# Patient Record
Sex: FEMALE | Race: WHITE | Employment: OTHER | URBAN - METROPOLITAN AREA
[De-identification: names, ages, dates, MRNs, and addresses within clinical notes are randomized per-mention and may not be internally consistent; named-entity substitution may affect disease eponyms.]

---

## 2017-04-26 ENCOUNTER — TELEPHONE (OUTPATIENT)
Dept: FAMILY MEDICINE | Facility: CLINIC | Age: 82
End: 2017-04-26

## 2017-04-26 ENCOUNTER — OFFICE VISIT (OUTPATIENT)
Dept: FAMILY MEDICINE | Facility: CLINIC | Age: 82
End: 2017-04-26
Payer: MEDICARE

## 2017-04-26 VITALS
RESPIRATION RATE: 18 BRPM | OXYGEN SATURATION: 97 % | HEART RATE: 96 BPM | BODY MASS INDEX: 28.88 KG/M2 | WEIGHT: 163 LBS | DIASTOLIC BLOOD PRESSURE: 65 MMHG | TEMPERATURE: 98.9 F | HEIGHT: 63 IN | SYSTOLIC BLOOD PRESSURE: 149 MMHG

## 2017-04-26 DIAGNOSIS — M25.671 STIFFNESS OF BOTH ANKLE JOINTS: Primary | ICD-10-CM

## 2017-04-26 DIAGNOSIS — M25.471 SWOLLEN ANKLES: ICD-10-CM

## 2017-04-26 DIAGNOSIS — M25.672 STIFFNESS OF BOTH ANKLE JOINTS: Primary | ICD-10-CM

## 2017-04-26 DIAGNOSIS — M25.472 SWOLLEN ANKLES: ICD-10-CM

## 2017-04-26 LAB
CRP SERPL-MCNC: 31 MG/L (ref 0–8)
ERYTHROCYTE [SEDIMENTATION RATE] IN BLOOD BY WESTERGREN METHOD: 25 MM/H (ref 0–30)

## 2017-04-26 PROCEDURE — 86140 C-REACTIVE PROTEIN: CPT | Performed by: NURSE PRACTITIONER

## 2017-04-26 PROCEDURE — 84550 ASSAY OF BLOOD/URIC ACID: CPT | Performed by: NURSE PRACTITIONER

## 2017-04-26 PROCEDURE — 85652 RBC SED RATE AUTOMATED: CPT | Performed by: NURSE PRACTITIONER

## 2017-04-26 PROCEDURE — 36415 COLL VENOUS BLD VENIPUNCTURE: CPT | Performed by: NURSE PRACTITIONER

## 2017-04-26 PROCEDURE — 99214 OFFICE O/P EST MOD 30 MIN: CPT | Performed by: NURSE PRACTITIONER

## 2017-04-26 NOTE — TELEPHONE ENCOUNTER
I would be happy to see her next week, since I am taking my Internal Medicine Boards on Friday, I am trying not to overload my schedule today and tomorrow  If her symptoms are severe a team appointment this week may be helpful  Cant comment on uric acid without seeing her, although gout in both feet/ankles is unusual and prednisone usually takes care of gout

## 2017-04-26 NOTE — TELEPHONE ENCOUNTER
Reason for Call:  Request for results:    Name of test or procedure: labs    Date of test of procedure: 4/26/17    Location of the test or procedure: [facility] to leave the result message on voice mail or with a family member? YES    Phone number Patient can be reached at:  Home number on file [phone] (home)    Additional comments:     Call taken on 4/26/2017 at 10:56 AM by Светлана Nieves

## 2017-04-26 NOTE — TELEPHONE ENCOUNTER
To PCP:  Are you able to see patient today?  Please see below.  Possible gout flare.  Please advise.  Thank you.  Tonie Atkinson RN

## 2017-04-26 NOTE — TELEPHONE ENCOUNTER
Reason for Call:  Other appointment    Detailed comments: pt was told to make an appt   With dr Cuevas next week  No openings till 5/26/17  Please call pt back and accommodate    Phone Number Patient can be reached at: Home number on file [phone] (home)    Best Time:     Can we leave a detailed message on this number? YES    Call taken on 4/26/2017 at 11:00 AM by Светлана Nieves

## 2017-04-26 NOTE — PATIENT INSTRUCTIONS
Follow up with Dr Cuevas in 1-2 weeks   If you get more pain or swelling, please call us right away   We will wait and see what your blood work shows     Try to elevate your feet  Use the compression stockings, especially on the left leg

## 2017-04-26 NOTE — NURSING NOTE
"Chief Complaint   Patient presents with     Edema       Initial /65 (BP Location: Right arm, Patient Position: Right side, Cuff Size: Adult Regular)  Pulse 96  Temp 98.9  F (37.2  C) (Tympanic)  Resp 18  Ht 5' 3\" (1.6 m)  Wt 163 lb (73.9 kg)  SpO2 97%  BMI 28.87 kg/m2 Estimated body mass index is 28.87 kg/(m^2) as calculated from the following:    Height as of this encounter: 5' 3\" (1.6 m).    Weight as of this encounter: 163 lb (73.9 kg).  Medication Reconciliation: complete   America Becerra CMA (AAMA)        "

## 2017-04-26 NOTE — MR AVS SNAPSHOT
"              After Visit Summary   4/26/2017    Tanisha Muhammad    MRN: [id number]           Patient Information     Date Of Birth          9/10/1926        Visit Information        Provider Department      4/26/2017 10:30 AM Sabi Oakley APRN CNP [facility]        Today's Diagnoses     Stiffness of both ankle joints    -  1    Swollen ankles          Care Instructions    Follow up with Dr Cuevas in 1-2 weeks   If you get more pain or swelling, please call us right away   We will wait and see what your blood work shows     Try to elevate your feet  Use the compression stockings, especially on the left leg           Follow-ups after your visit        Who to contact     If you have questions or need follow up information about today's clinic visit or your schedule please contact [facility] directly at [phone].  Normal or non-critical lab and imaging results will be communicated to you by MyChart, letter or phone within 4 business days after the clinic has received the results. If you do not hear from us within 7 days, please contact the clinic through MyChart or phone. If you have a critical or abnormal lab result, we will notify you by phone as soon as possible.  Submit refill requests through Traxer or call your pharmacy and they will forward the refill request to us. Please allow 3 business days for your refill to be completed.          Additional Information About Your Visit        MyChart Information     Traxer lets you send messages to your doctor, view your test results, renew your prescriptions, schedule appointments and more. To sign up, go to [email or web address] . Click on \"Log in\" on the left side of the screen, which will take you to the Welcome page. Then click on \"Sign up Now\" on the right side of the page.     You will be asked to enter the access code listed below, as well as some personal information. Please follow the directions to create your username " "and password.     Your access code is: [email or web address]  Expires: 2017 10:32 AM     Your access code will  in 90 days. If you need help or a new code, please call your [place] clinic or [phone].        Care EveryWhere ID     This is your Care EveryWhere ID. This could be used by other organizations to access your [facility] medical records  [id number]        Your Vitals Were     Pulse Temperature Respirations Height Pulse Oximetry BMI (Body Mass Index)    96 98.9  F (37.2  C) (Tympanic) 18 5' 3\" (1.6 m) 97% 28.87 kg/m2       Blood Pressure from Last 3 Encounters:   17 149/65   10/13/16 138/82   16 149/70    Weight from Last 3 Encounters:   17 163 lb (73.9 kg)   10/13/16 160 lb (72.6 kg)   16 160 lb (72.6 kg)              We Performed the Following     CRP, inflammation     ESR: Erythrocyte sedimentation rate     Uric acid        Primary Care Provider Office Phone # Fax #    Sesar Cuevas [phone]       [facility] [address]  [facility] [address]        Thank you!     Thank you for choosing [facility]  for your care. Our goal is always to provide you with excellent care. Hearing back from our patients is one way we can continue to improve our services. Please take a few minutes to complete the written survey that you may receive in the mail after your visit with us. Thank you!             Your Updated Medication List - Protect others around you: Learn how to safely use, store and throw away your medicines at www.disposemymeds.org.          This list is accurate as of: 17 10:32 AM.  Always use your most recent med list.                   Brand Name Dispense Instructions for use    diphenhydrAMINE 25 MG tablet    BENADRYL    40 tablet    Take 1 tablet (25 mg) by mouth every 6 hours as needed for itching       losartan 50 MG tablet    COZAAR    90 tablet    TAKE 1 TABLET (50 MG) BY MOUTH DAILY       predniSONE 20 MG tablet    " DELTASONE    5 tablet    Take 1 tablet (20 mg) by mouth daily       VITAMIN B-12 PO          VITAMIN D3 PO      Take by mouth daily

## 2017-04-26 NOTE — TELEPHONE ENCOUNTER
Reason for call:  Patient reporting a symptom    Symptom or request: ankles are swollen and stiff.  She took prednisone after Easter for 5 days but it did not help      Have you been treated for this before? Yes    Additional comments: she is wondering if uric acid levels are high and should she get it tested.     Phone Number patient can be reached at:  Home number on file [phone] (home)    Best Time:  any    Can we leave a detailed message on this number:  YES    Call taken on 4/26/2017 at 8:01 AM by Ariana Patel

## 2017-04-26 NOTE — PROGRESS NOTES
HPI    SUBJECTIVE:                                                    Tanisha Muhammad is a 89 year old female who presents to clinic today for the following health issues:    Swelling-both ankles      Duration: 10 days    Description (location/character/radiation): Both ankles stiff, painful and swelling    Intensity:  moderate    Accompanying signs and symptoms: Unsure if ankles are warm to touch, no redness or bruising    History (similar episodes/previous evaluation): personal Hx of gout    Precipitating or alleviating factors: None    Therapies tried and outcome: elevating feet/legs-no relief     Patient presents with bilateral ankle pain, stiffness, and edema. Left ankle is slightly larger than right. She rates her pain at 6/10 on the pain scale. She stated, her symptom started after she came from [place].  Due to her history of gout, she took 20 mg Prednisone x5days but her symptom did not resolve. She has slight pain with range of motion in both ankles. She denies bilateral calf tenderness and does not have a history of blood clot. She also denies numbness,  tingling, other joints pain, known injury, SOB and chest pain.       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 Will need bx if continues following estrogen cream and leaving pessary out for 3-4 weeks.      Rectocele      Trigeminal neuralgia 2015     Unspecified essential hypertension      Unspecified venous (peripheral) insufficiency      Past Surgical History:   Procedure Laterality Date     C DENTAL CONSULTATION      Dr Kahlil CEJA EYE EXAM, EST PATIENT,COMPREHESV      DR white     CATARACT IOL, RT/LT      Left     LASER YAG CAPSULOTOMY Left  "10/2/2015    Procedure: LASER YAG CAPSULOTOMY;  Surgeon: Pieter Rios MD;  Location: [facility]     Social History   Substance Use Topics     Smoking status: Never Smoker     Smokeless tobacco: Never Used     Alcohol use 0.0 oz/week     0 Standard drinks or equivalent per week      Comment: one glass wine a month     Current Outpatient Prescriptions   Medication Sig Dispense Refill     losartan (COZAAR) 50 MG tablet TAKE 1 TABLET (50 MG) BY MOUTH DAILY 90 tablet 3     diphenhydrAMINE (BENADRYL) 25 MG tablet Take 1 tablet (25 mg) by mouth every 6 hours as needed for itching 40 tablet 0     Cyanocobalamin (VITAMIN B-12 PO)        Cholecalciferol (VITAMIN D3 PO) Take by mouth daily       predniSONE (DELTASONE) 20 MG tablet Take 1 tablet (20 mg) by mouth daily (Patient not taking: Reported on 4/26/2017) 5 tablet 1     Allergies   Allergen Reactions     Indomethacin      Cognitive impairment     Keflex [Cephalexin]      HIVES     Sulfamethoxazole-Trimethoprim        Reviewed PMH, med list and allergies.      Review of Systems   detailed as above       /65 (BP Location: Right arm, Patient Position: Right side, Cuff Size: Adult Regular)  Pulse 96  Temp 98.9  F (37.2  C) (Tympanic)  Resp 18  Ht 5' 3\" (1.6 m)  Wt 163 lb (73.9 kg)  SpO2 97%  BMI 28.87 kg/m2      Physical Exam   Constitutional: She is oriented to person, place, and time and well-developed, well-nourished, and in no distress.   HENT:   Head: Normocephalic.   Eyes: Conjunctivae are normal.   Cardiovascular: Intact distal pulses.    Pulmonary/Chest: Effort normal.   Musculoskeletal: Normal range of motion. She exhibits edema.   Left ankle (+2) larger than right ankle (+1)   Neurological: She is alert and oriented to person, place, and time.   Skin: Skin is warm and dry. No erythema.   Slight warmth to left ankle   Psychiatric: Affect normal.         Assessment and Plan:       ICD-10-CM    1. Stiffness of both ankle joints M25.671 Uric acid    " M25.672 CRP, inflammation     ESR: Erythrocyte sedimentation rate   2. Swollen ankles M25.471     M25.472      Tanisha's symptoms not c/w DVT considering the bilateral nature of her stiffness  Not perfectly c/w gout as she did not have resolution with prednisone, but will check uric acid level   Not c/w cardiopulm etiology   This is more likely arthritic vs exacerbation of edema considering recent travel. Will check screening inflammatory labs   Recommended elevation and compression stocking   F/u with PCP if persistent symptoms or call with questions      CRYSTAL Mcclendon, CNP  [facility]

## 2017-04-27 LAB — URATE SERPL-MCNC: 7.7 MG/DL (ref 2.6–6)

## 2017-04-27 NOTE — TELEPHONE ENCOUNTER
Patient called to check on her lab results.  She said someone left her a message.  Please call back when convienent.

## 2017-04-27 NOTE — TELEPHONE ENCOUNTER
Dr. Cuevas, this TE was mistakenly taken over for lab results. Are we able to take a   Hospital F/U appt for this pt, please scroll down to see original message.

## 2017-04-27 NOTE — TELEPHONE ENCOUNTER
This encounter was originally asking Dr. Cuevas where can put pt in schedule, was told to see him next week.      Has previously been routed to Dr. Cuevas.     There was a separate Tel Enc started yesterday for lab results which have been given.   Notes Recorded by Sabi Oakley APRN CNP on 4/27/2017 at 3:21 PM  Spoke with pt about results. She will try a dose of 600mg ibu tonight and tomorrow if needed.      Gaye MARAVILLA MA

## 2017-05-01 ENCOUNTER — HOSPITAL ENCOUNTER (OUTPATIENT)
Dept: ULTRASOUND IMAGING | Facility: CLINIC | Age: 82
Discharge: HOME | End: 2017-05-01
Attending: INTERNAL MEDICINE | Admitting: INTERNAL MEDICINE
Payer: MEDICARE

## 2017-05-01 ENCOUNTER — OFFICE VISIT (OUTPATIENT)
Dept: FAMILY MEDICINE | Facility: CLINIC | Age: 82
End: 2017-05-01
Payer: MEDICARE

## 2017-05-01 VITALS
DIASTOLIC BLOOD PRESSURE: 67 MMHG | HEIGHT: 63 IN | TEMPERATURE: 98.2 F | OXYGEN SATURATION: 97 % | SYSTOLIC BLOOD PRESSURE: 153 MMHG | HEART RATE: 83 BPM | BODY MASS INDEX: 28.88 KG/M2 | WEIGHT: 163 LBS

## 2017-05-01 DIAGNOSIS — M10.9 ACUTE GOUTY ARTHRITIS: ICD-10-CM

## 2017-05-01 DIAGNOSIS — M79.605 PAIN OF LEFT LOWER EXTREMITY: Primary | ICD-10-CM

## 2017-05-01 DIAGNOSIS — M79.89 LEFT LEG SWELLING: ICD-10-CM

## 2017-05-01 DIAGNOSIS — M79.605 PAIN OF LEFT LOWER EXTREMITY: ICD-10-CM

## 2017-05-01 PROCEDURE — 99213 OFFICE O/P EST LOW 20 MIN: CPT | Performed by: INTERNAL MEDICINE

## 2017-05-01 PROCEDURE — 93971 EXTREMITY STUDY: CPT | Mod: LT

## 2017-05-01 RX ORDER — PREDNISONE 20 MG/1
40 TABLET ORAL DAILY
Qty: 10 TABLET | Refills: 0 | Status: SHIPPED | OUTPATIENT
Start: 2017-05-01 | End: 2017-05-06

## 2017-05-01 ASSESSMENT — PAIN SCALES - GENERAL: PAINLEVEL: SEVERE PAIN (7)

## 2017-05-01 NOTE — NURSING NOTE
"Chief Complaint   Patient presents with     Musculoskeletal Problem     F/U Bilateral ankle pain and edema. Right ankle has completely resolved, left is still painful. Has started wearing a short Cam boot to help.        Initial /67 (BP Location: Right arm, Cuff Size: Adult Large)  Pulse 83  Temp 98.2  F (36.8  C) (Tympanic)  Ht 5' 3\" (1.6 m)  Wt 163 lb (73.9 kg)  SpO2 97%  Breastfeeding? No  BMI 28.87 kg/m2 Estimated body mass index is 28.87 kg/(m^2) as calculated from the following:    Height as of this encounter: 5' 3\" (1.6 m).    Weight as of this encounter: 163 lb (73.9 kg).  Medication Reconciliation: complete     Katerin Bonner MA    "

## 2017-05-01 NOTE — MR AVS SNAPSHOT
After Visit Summary   5/1/2017    Tanisha Muhammad    MRN: [id number]           Patient Information     Date Of Birth          9/10/1926        Visit Information        Provider Department      5/1/2017 3:30 PM Sesar Cuevas MD [facility]        Today's Diagnoses     Pain of left lower extremity    -  1    Left leg swelling        Acute gouty arthritis           Follow-ups after your visit        Your next 10 appointments already scheduled     May 01, 2017  5:30 PM CDT   US LOWER EXTREMITY VENOUS DUPLEX LEFT with SHUS1   [facility] ([facility])    [address] [facility] [id number] [phone]           Please bring a list of your medicines (including vitamins, minerals and over-the-counter drugs). Also, tell your doctor about any allergies you may have. Wear comfortable clothes and leave your valuables at home.  You do not need to do anything special to prepare for your exam.  Please call the Imaging Department at your exam site with any questions.            May 11, 2017  2:00 PM CDT   Office Visit with Sesar Cuevas MD   [facility] ([facility])    [address] [facility] [address] [phone]           Bring a current list of meds and any records pertaining to this visit.  For Physicals, please bring immunization records and any forms needing to be filled out.  Please arrive 10 minutes early to complete paperwork.              Future tests that were ordered for you today     Open Future Orders        Priority Expected Expires Ordered    US Lower Extremity Venous Duplex Left Today  5/1/2018 5/1/2017            Who to contact     If you have questions or need follow up information about today's clinic visit or your schedule please contact [facility] directly at [phone].  Normal or non-critical lab and imaging results will be communicated to you by MyChart, letter or phone within 4  "business days after the clinic has received the results. If you do not hear from us within 7 days, please contact the clinic through Snapcious or phone. If you have a critical or abnormal lab result, we will notify you by phone as soon as possible.  Submit refill requests through Snapcious or call your pharmacy and they will forward the refill request to us. Please allow 3 business days for your refill to be completed.          Additional Information About Your Visit        Snapcious Information     Snapcious lets you send messages to your doctor, view your test results, renew your prescriptions, schedule appointments and more. To sign up, go to [email or web address] . Click on \"Log in\" on the left side of the screen, which will take you to the Welcome page. Then click on \"Sign up Now\" on the right side of the page.     You will be asked to enter the access code listed below, as well as some personal information. Please follow the directions to create your username and password.     Your access code is: [email or web address]  Expires: 2017 10:32 AM     Your access code will  in 90 days. If you need help or a new code, please call your [place] [facility] clinic or [phone].        Care EveryWhere ID     This is your Care EveryWhere ID. This could be used by other organizations to access your [facility] medical records  [id number]        Your Vitals Were     Pulse Temperature Height Pulse Oximetry Breastfeeding? BMI (Body Mass Index)    83 98.2  F (36.8  C) (Tympanic) 5' 3\" (1.6 m) 97% No 28.87 kg/m2       Blood Pressure from Last 3 Encounters:   17 153/67   17 149/65   10/13/16 138/82    Weight from Last 3 Encounters:   17 163 lb (73.9 kg)   17 163 lb (73.9 kg)   10/13/16 160 lb (72.6 kg)                 Today's Medication Changes          These changes are accurate as of: 17  5:28 PM.  If you have any questions, ask your nurse or doctor.               These medicines have changed or have updated " prescriptions.        Dose/Directions    * predniSONE 20 MG tablet   Commonly known as:  DELTASONE   This may have changed:  Another medication with the same name was added. Make sure you understand how and when to take each.   Used for:  Gout, unspecified cause, unspecified chronicity, unspecified site   Changed by:  Sesar Cuevas MD        Dose:  20 mg   Take 1 tablet (20 mg) by mouth daily   Quantity:  5 tablet   Refills:  1       * predniSONE 20 MG tablet   Commonly known as:  DELTASONE   This may have changed:  You were already taking a medication with the same name, and this prescription was added. Make sure you understand how and when to take each.   Used for:  Acute gouty arthritis   Changed by:  Sesar Cuevas MD        Dose:  40 mg   Take 2 tablets (40 mg) by mouth daily for 5 days   Quantity:  10 tablet   Refills:  0       * Notice:  This list has 2 medication(s) that are the same as other medications prescribed for you. Read the directions carefully, and ask your doctor or other care provider to review them with you.         Where to get your medicines      These medications were sent to [facility] [address], [facility] [[email or web address]] [address]  [address] [place] [address] [address], [place] [address], [facility] [address]     Phone:  [phone]     predniSONE 20 MG tablet                Primary Care Provider Office Phone # Fax #    Sesar Cuevas [phone]       [facility] [address] [place] [address]        Thank you!     Thank you for choosing [facility]  for your care. Our goal is always to provide you with excellent care. Hearing back from our patients is one way we can continue to improve our services. Please take a few minutes to complete the written survey that you may receive in the mail after your visit with us. Thank you!             Your Updated Medication List - Protect others around you: Learn how to safely use, store and throw away your medicines  at www.disposemymeds.org.          This list is accurate as of: 5/1/17  5:28 PM.  Always use your most recent med list.                   Brand Name Dispense Instructions for use    diphenhydrAMINE 25 MG tablet    BENADRYL    40 tablet    Take 1 tablet (25 mg) by mouth every 6 hours as needed for itching       losartan 50 MG tablet    COZAAR    90 tablet    TAKE 1 TABLET (50 MG) BY MOUTH DAILY       * predniSONE 20 MG tablet    DELTASONE    5 tablet    Take 1 tablet (20 mg) by mouth daily       * predniSONE 20 MG tablet    DELTASONE    10 tablet    Take 2 tablets (40 mg) by mouth daily for 5 days       VITAMIN B-12 PO          VITAMIN D3 PO      Take by mouth daily       * Notice:  This list has 2 medication(s) that are the same as other medications prescribed for you. Read the directions carefully, and ask your doctor or other care provider to review them with you.
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
## 2017-05-11 ENCOUNTER — OFFICE VISIT (OUTPATIENT)
Dept: FAMILY MEDICINE | Facility: CLINIC | Age: 82
End: 2017-05-11
Payer: MEDICARE

## 2017-05-11 VITALS
SYSTOLIC BLOOD PRESSURE: 142 MMHG | TEMPERATURE: 96.7 F | OXYGEN SATURATION: 100 % | HEART RATE: 74 BPM | DIASTOLIC BLOOD PRESSURE: 65 MMHG

## 2017-05-11 DIAGNOSIS — M1A.09X0 CHRONIC GOUT OF MULTIPLE SITES, UNSPECIFIED CAUSE: Primary | ICD-10-CM

## 2017-05-11 PROBLEM — I49.9 CARDIAC ARRHYTHMIA: Status: ACTIVE | Noted: 2017-05-11

## 2017-05-11 PROBLEM — E55.9 VITAMIN D DEFICIENCY: Status: ACTIVE | Noted: 2017-05-11

## 2017-05-11 PROBLEM — E53.9 VITAMIN B-COMPLEX DEFICIENCY: Status: ACTIVE | Noted: 2017-05-11

## 2017-05-11 PROCEDURE — 99213 OFFICE O/P EST LOW 20 MIN: CPT | Performed by: INTERNAL MEDICINE

## 2017-05-11 RX ORDER — PREDNISONE 20 MG/1
40 TABLET ORAL DAILY
Qty: 10 TABLET | Refills: 0 | Status: SHIPPED | OUTPATIENT
Start: 2017-05-11 | End: 2017-08-04

## 2017-05-11 RX ORDER — ALLOPURINOL 100 MG/1
100 TABLET ORAL DAILY
Qty: 90 TABLET | Refills: 3 | Status: SHIPPED | OUTPATIENT
Start: 2017-05-11 | End: 2017-08-05

## 2017-05-11 RX ORDER — CHLORHEXIDINE GLUCONATE ORAL RINSE 1.2 MG/ML
SOLUTION DENTAL
Refills: 0 | COMMUNITY
Start: 2017-05-04 | End: 2017-08-04

## 2017-05-11 RX ORDER — AMOXICILLIN 500 MG/1
CAPSULE ORAL
Refills: 0 | COMMUNITY
Start: 2017-05-04 | End: 2017-08-04

## 2017-05-11 NOTE — PATIENT INSTRUCTIONS
To prevent future gout attacks, start allopurinol    Take prednisone for the first 5 days of starting allopurinol    Return to the lab here in 1 month to check uric acid level    Our goal is a uric acid level less than 6

## 2017-05-11 NOTE — MR AVS SNAPSHOT
After Visit Summary   5/11/2017    Tnaisha Muhammad    MRN: [id number]           Patient Information     Date Of Birth          9/10/1926        Visit Information        Provider Department      5/11/2017 2:00 PM Sesar Cuevas MD [facility]        Today's Diagnoses     Chronic gout of multiple sites, unspecified cause    -  1      Care Instructions    To prevent future gout attacks, start allopurinol    Take prednisone for the first 5 days of starting allopurinol    Return to the lab here in 1 month to check uric acid level    Our goal is a uric acid level less than 6         Follow-ups after your visit        Follow-up notes from your care team     Return in about 4 weeks (around 6/8/2017) for Lab Work.      Future tests that were ordered for you today     Open Future Orders        Priority Expected Expires Ordered    **Basic metabolic panel FUTURE 2mo Routine 7/10/2017 9/8/2017 5/11/2017    **Uric acid FUTURE 2mo Routine 7/10/2017 9/8/2017 5/11/2017            Who to contact     If you have questions or need follow up information about today's clinic visit or your schedule please contact [facility] directly at [phone].  Normal or non-critical lab and imaging results will be communicated to you by MyChart, letter or phone within 4 business days after the clinic has received the results. If you do not hear from us within 7 days, please contact the clinic through [employer] or phone. If you have a critical or abnormal lab result, we will notify you by phone as soon as possible.  Submit refill requests through [employer] or call your pharmacy and they will forward the refill request to us. Please allow 3 business days for your refill to be completed.          Additional Information About Your Visit        MyChart Information     [employer]ator lets you send messages to your doctor, view your test results, renew your prescriptions, schedule appointments and more. To sign up, go to  [email or web address] . Click on \"Log in\" on the left side of the screen, which will take you to the Welcome page. Then click on \"Sign up Now\" on the right side of the page.     You will be asked to enter the access code listed below, as well as some personal information. Please follow the directions to create your username and password.     Your access code is: [email or web address]  Expires: 2017 10:32 AM     Your access code will  in 90 days. If you need help or a new code, please call your [facility] clinic or [phone].        Care EveryWhere ID     This is your Care EveryWhere ID. This could be used by other organizations to access your [facility] medical records  [id number]        Your Vitals Were     Pulse Temperature Pulse Oximetry Breastfeeding?          74 96.7  F (35.9  C) (Tympanic) 100% No         Blood Pressure from Last 3 Encounters:   17 142/65   17 153/67   17 149/65    Weight from Last 3 Encounters:   17 163 lb (73.9 kg)   17 163 lb (73.9 kg)   10/13/16 160 lb (72.6 kg)                 Today's Medication Changes          These changes are accurate as of: 17  2:38 PM.  If you have any questions, ask your nurse or doctor.               Start taking these medicines.        Dose/Directions    allopurinol 100 MG tablet   Commonly known as:  ZYLOPRIM   Used for:  Chronic gout of multiple sites, unspecified cause   Started by:  Sesar uCevas MD        Dose:  100 mg   Take 1 tablet (100 mg) by mouth daily   Quantity:  90 tablet   Refills:  3         These medicines have changed or have updated prescriptions.        Dose/Directions    predniSONE 20 MG tablet   Commonly known as:  DELTASONE   This may have changed:  how much to take   Changed by:  Sesar Cuevas MD        Dose:  40 mg   Take 2 tablets (40 mg) by mouth daily   Quantity:  10 tablet   Refills:  0            Where to get your medicines      These medications were sent to [id number] - [place] - [address]  [id number], [place] [address]     Phone:  [phone]     allopurinol 100 MG tablet    predniSONE 20 MG tablet                Primary Care Provider Office Phone # Fax #    Sesar Cuevas [phone]       [facility] [address]  [facility] [address]        Thank you!     Thank you for choosing [facility]  for your care. Our goal is always to provide you with excellent care. Hearing back from our patients is one way we can continue to improve our services. Please take a few minutes to complete the written survey that you may receive in the mail after your visit with us. Thank you!             Your Updated Medication List - Protect others around you: Learn how to safely use, store and throw away your medicines at www.disposemymeds.org.          This list is accurate as of: 5/11/17  2:38 PM.  Always use your most recent med list.                   Brand Name Dispense Instructions for use    allopurinol 100 MG tablet    ZYLOPRIM    90 tablet    Take 1 tablet (100 mg) by mouth daily       amoxicillin 500 MG capsule    AMOXIL         chlorhexidine 0.12 % solution    PERIDEX         diphenhydrAMINE 25 MG tablet    BENADRYL    40 tablet    Take 1 tablet (25 mg) by mouth every 6 hours as needed for itching       IBUPROFEN PO      Take 200 mg by mouth every 6 hours       losartan 50 MG tablet    COZAAR    90 tablet    TAKE 1 TABLET (50 MG) BY MOUTH DAILY       predniSONE 20 MG tablet    DELTASONE    10 tablet    Take 2 tablets (40 mg) by mouth daily       VITAMIN B-12 PO          VITAMIN D3 PO      Take by mouth daily

## 2017-05-11 NOTE — NURSING NOTE
"Chief Complaint   Patient presents with     Leg Swelling     follow up left leg edema. Pain has resolved, but still has some swelling, especially after eating foods with sodium.       Initial /65 (BP Location: Right arm, Cuff Size: Adult Large)  Pulse 74  Temp 96.7  F (35.9  C) (Tympanic)  SpO2 100%  Breastfeeding? No Estimated body mass index is 28.87 kg/(m^2) as calculated from the following:    Height as of 5/1/17: 5' 3\" (1.6 m).    Weight as of 5/1/17: 163 lb (73.9 kg).  Medication Reconciliation: complete     Katerin Bonner MA      "
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
## 2017-08-04 ENCOUNTER — TELEPHONE (OUTPATIENT)
Dept: FAMILY MEDICINE | Facility: CLINIC | Age: 82
End: 2017-08-04

## 2017-08-04 ENCOUNTER — OFFICE VISIT (OUTPATIENT)
Dept: FAMILY MEDICINE | Facility: CLINIC | Age: 82
End: 2017-08-04
Payer: MEDICARE

## 2017-08-04 VITALS
BODY MASS INDEX: 29.06 KG/M2 | TEMPERATURE: 97.8 F | WEIGHT: 164 LBS | HEART RATE: 78 BPM | HEIGHT: 63 IN | OXYGEN SATURATION: 96 % | DIASTOLIC BLOOD PRESSURE: 73 MMHG | SYSTOLIC BLOOD PRESSURE: 164 MMHG

## 2017-08-04 DIAGNOSIS — M1A.09X0 CHRONIC GOUT OF MULTIPLE SITES, UNSPECIFIED CAUSE: ICD-10-CM

## 2017-08-04 DIAGNOSIS — M10.9 GOUT, UNSPECIFIED CAUSE, UNSPECIFIED CHRONICITY, UNSPECIFIED SITE: Primary | ICD-10-CM

## 2017-08-04 PROCEDURE — 99213 OFFICE O/P EST LOW 20 MIN: CPT | Performed by: NURSE PRACTITIONER

## 2017-08-04 PROCEDURE — 36415 COLL VENOUS BLD VENIPUNCTURE: CPT | Performed by: INTERNAL MEDICINE

## 2017-08-04 PROCEDURE — 80048 BASIC METABOLIC PNL TOTAL CA: CPT | Performed by: INTERNAL MEDICINE

## 2017-08-04 PROCEDURE — 84550 ASSAY OF BLOOD/URIC ACID: CPT | Performed by: INTERNAL MEDICINE

## 2017-08-04 RX ORDER — PREDNISONE 20 MG/1
40 TABLET ORAL DAILY
Qty: 10 TABLET | Refills: 0 | Status: SHIPPED | OUTPATIENT
Start: 2017-08-04 | End: 2019-03-11

## 2017-08-04 NOTE — NURSING NOTE
"Chief Complaint   Patient presents with     Follow Up For     Chronic gout of multiple sites       Initial /73 (BP Location: Right arm, Cuff Size: Adult Large)  Pulse 78  Temp 97.8  F (36.6  C) (Oral)  Ht 5' 3\" (1.6 m)  Wt 164 lb (74.4 kg)  SpO2 96%  BMI 29.05 kg/m2 Estimated body mass index is 29.05 kg/(m^2) as calculated from the following:    Height as of this encounter: 5' 3\" (1.6 m).    Weight as of this encounter: 164 lb (74.4 kg).  Medication Reconciliation: complete       Angela Hamilton CMA      "

## 2017-08-04 NOTE — TELEPHONE ENCOUNTER
Pt reports didn't take allopurinol 2 days in a row.  Reports on side of right foot - Foot is swollen, painful, red.  Denies SOB Chest Pain     Scheduled with team for 2 hours for now for eval if Gout attack and needing further medication/tx or if needing other further eval.  Pt in agreement with this plan.  Casie Fish RN

## 2017-08-04 NOTE — PROGRESS NOTES
HPI      SUBJECTIVE:                                                    Tanisha Muhammad is a 89 year old female who presents to clinic today for the following health issues:      Chief Complaint   Patient presents with     Follow Up For     Chronic gout of multiple sites   Right foot pain and swelling. She state that her last gout attack was in May 2017.      Had been doing well on allopurinol  Forgot to take it for 2 days then had food she shouldn't have eaten--marks and pork   Now has had 3 days of right foot swelling and pain   Took 600mg ibu today that helped a lot   No fevers       Past Medical History:   Diagnosis Date     Cystocele 03/2010    midline     Elevated glucose      Gout 03/2010     [place]'s disease 2010     Inactive Ménière's disease     Left ear deafness     Post-menopausal bleeding 01/02/2014    possibly due to pessary, area of irritated skin visualized on exam with active light bleeding posterior to cervix. Will need bx if continues following estrogen cream and leaving pessary out for 3-4 weeks.      Rectocele      Trigeminal neuralgia 2015     Unspecified essential hypertension      Unspecified venous (peripheral) insufficiency      Past Surgical History:   Procedure Laterality Date     C DENTAL CONSULTATION      Dr Kahlil CEJA EYE EXAM, EST PATIENT,COMPREHESV      DR white     CATARACT IOL, RT/LT      Left     LASER YAG CAPSULOTOMY Left 10/2/2015    Procedure: LASER YAG CAPSULOTOMY;  Surgeon: Pieter Rios MD;  Location: [facility]     Social History   Substance Use Topics     Smoking status: Never Smoker     Smokeless tobacco: Never Used     Alcohol use 0.0 oz/week     0 Standard drinks or equivalent per week      Comment: one glass wine a month     Current Outpatient Prescriptions   Medication Sig Dispense Refill     IBUPROFEN PO Take 600 mg by mouth every 6 hours        allopurinol (ZYLOPRIM) 100 MG tablet Take 1 tablet (100 mg) by mouth daily 90 tablet 3     losartan (COZAAR) 50 MG  "tablet TAKE 1 TABLET (50 MG) BY MOUTH DAILY 90 tablet 3     Cyanocobalamin (VITAMIN B-12 PO) Take 1 tablet by mouth daily        Cholecalciferol (VITAMIN D3 PO) Take 1 tablet by mouth daily        Allergies   Allergen Reactions     Indomethacin      Cognitive impairment     Keflex [Cephalexin]      HIVES     Sulfamethoxazole-Trimethoprim        Reviewed and updated as needed this visit by clinical staff and provider      ROS  Detailed as above       /73 (BP Location: Right arm, Cuff Size: Adult Large)  Pulse 78  Temp 97.8  F (36.6  C) (Oral)  Ht 5' 3\" (1.6 m)  Wt 164 lb (74.4 kg)  SpO2 96%  BMI 29.05 kg/m2      Physical Exam   Constitutional: She is well-developed, well-nourished, and in no distress.   HENT:   Head: Normocephalic.   Pulmonary/Chest: Effort normal.   Musculoskeletal: Normal range of motion.   Right dorsal foot swelling and mild erythema and warmth    Neurological: She is alert.   Psychiatric: Mood and affect normal.   Vitals reviewed.      Assessment and Plan:       ICD-10-CM    1. Gout, unspecified cause, unspecified chronicity, unspecified site M10.9 predniSONE (DELTASONE) 20 MG tablet     **Basic metabolic panel FUTURE 2mo     **Uric acid FUTURE 2mo       Will treat with prednisone as this has worked well in the past   She will get labs completed today   She will continue her allopurinol       CRYSTAL Mcclendon, CNP  [facility]    "

## 2017-08-04 NOTE — MR AVS SNAPSHOT
"              After Visit Summary   2017    Tanisha Muhammad    MRN: [id number]           Patient Information     Date Of Birth          9/10/1926        Visit Information        Provider Department      2017 2:00 PM Sabi Oakley APRN CNP [facility]        Today's Diagnoses     Gout, unspecified cause, unspecified chronicity, unspecified site    -  1      Care Instructions    Take the prednisone as prescribed           Follow-ups after your visit        Who to contact     If you have questions or need follow up information about today's clinic visit or your schedule please contact [facility] directly at [phone].  Normal or non-critical lab and imaging results will be communicated to you by Ausrahart, letter or phone within 4 business days after the clinic has received the results. If you do not hear from us within 7 days, please contact the clinic through MyChart or phone. If you have a critical or abnormal lab result, we will notify you by phone as soon as possible.  Submit refill requests through Omnistream or call your pharmacy and they will forward the refill request to us. Please allow 3 business days for your refill to be completed.          Additional Information About Your Visit        MyChart Information     Omnistream lets you send messages to your doctor, view your test results, renew your prescriptions, schedule appointments and more. To sign up, go to [email or web address] . Click on \"Log in\" on the left side of the screen, which will take you to the Welcome page. Then click on \"Sign up Now\" on the right side of the page.     You will be asked to enter the access code listed below, as well as some personal information. Please follow the directions to create your username and password.     Your access code is: [email or web address]  Expires: 2017  4:26 PM     Your access code will  in 90 days. If you need help or a new code, please call your [place] clinic or " "[phone].        Care EveryWhere ID     This is your Care EveryWhere ID. This could be used by other organizations to access your [place] medical records  [id number]        Your Vitals Were     Pulse Temperature Height Pulse Oximetry BMI (Body Mass Index)       78 97.8  F (36.6  C) (Oral) 5' 3\" (1.6 m) 96% 29.05 kg/m2        Blood Pressure from Last 3 Encounters:   08/04/17 164/73   05/11/17 142/65   05/01/17 153/67    Weight from Last 3 Encounters:   08/04/17 164 lb (74.4 kg)   05/01/17 163 lb (73.9 kg)   04/26/17 163 lb (73.9 kg)              We Performed the Following     **Basic metabolic panel FUTURE 2mo     **Uric acid FUTURE 2mo          Today's Medication Changes          These changes are accurate as of: 8/4/17  4:26 PM.  If you have any questions, ask your nurse or doctor.               Start taking these medicines.        Dose/Directions    predniSONE 20 MG tablet   Commonly known as:  DELTASONE   Used for:  Gout, unspecified cause, unspecified chronicity, unspecified site   Started by:  Sabi Oakley APRN CNP        Dose:  40 mg   Take 2 tablets (40 mg) by mouth daily for 5 days   Quantity:  10 tablet   Refills:  0         Stop taking these medicines if you haven't already. Please contact your care team if you have questions.     amoxicillin 500 MG capsule   Commonly known as:  AMOXIL   Stopped by:  Sabi Oakley APRN CNP           chlorhexidine 0.12 % solution   Commonly known as:  PERIDEX   Stopped by:  Sabi Oakley APRN CNP           diphenhydrAMINE 25 MG tablet   Commonly known as:  BENADRYL   Stopped by:  Sabi Oakley APRN CNP                Where to get your medicines      These medications were sent to [facility], [place] - [address], [address]  [address], [address], [facility] [address]     Phone:  [phone]     predniSONE 20 MG tablet                Primary Care Provider Office Phone # Fax #    Sesar Cuevas MD " [phone]       [facility] [address]  [facility] [address]        Equal Access to Services     MARCO PEREZ : Hadii aad ku hadjasvirviktoriya Francisco, leonorada brittanyberkley, juan carlosta kabensonda rodri, theron pierre laCheloalessandro fitzgerald. So [facility] [phone].    ATENCIÓN: Si habla español, tiene a villanueva disposición servicios gratuitos de asistencia lingüística. Llame al [phone].    We comply with applicable federal civil rights laws and [place] laws. We do not discriminate on the basis of race, color, national origin, age, disability sex, sexual orientation or gender identity.            Thank you!     Thank you for choosing [facility]  for your care. Our goal is always to provide you with excellent care. Hearing back from our patients is one way we can continue to improve our services. Please take a few minutes to complete the written survey that you may receive in the mail after your visit with us. Thank you!             Your Updated Medication List - Protect others around you: Learn how to safely use, store and throw away your medicines at www.disposemymeds.org.          This list is accurate as of: 8/4/17  4:26 PM.  Always use your most recent med list.                   Brand Name Dispense Instructions for use Diagnosis    allopurinol 100 MG tablet    ZYLOPRIM    90 tablet    Take 1 tablet (100 mg) by mouth daily    Chronic gout of multiple sites, unspecified cause       IBUPROFEN PO      Take 600 mg by mouth every 6 hours        losartan 50 MG tablet    COZAAR    90 tablet    TAKE 1 TABLET (50 MG) BY MOUTH DAILY    Essential hypertension, benign       predniSONE 20 MG tablet    DELTASONE    10 tablet    Take 2 tablets (40 mg) by mouth daily for 5 days    Gout, unspecified cause, unspecified chronicity, unspecified site       VITAMIN B-12 PO      Take 1 tablet by mouth daily        VITAMIN D3 PO      Take 1 tablet by mouth daily

## 2017-08-04 NOTE — LETTER
[place] [address] [facility]  [address]  [place]  [address]  Tel: [phone]    August 7, 2017    Tanisha Muhammad  [address]   [facility] [address]        Dear Ms. Muhammad,    The following letter pertains to your most recent diagnostic tests:    -Your gout blood test (uric acid) is above your goal of uric acid less than 6.  Having a uric acid less than 6 optimally prevent gout attacks.       Bottom line:  Increase allopurinol from 100mg daily to 150mg daily (1.5 tablets).        Follow up:  Recheck uric acid level in 2 months.  You can schedule a lab appointment for that purpose.         I have sent a new prescritpion to pharmacy indicating the allopurinol dose change.        If you have any further questions or problems, please contact our office.      Sincerely,    Sesar Cuevas MD/[place],CMA      Enclosure: Lab Results      Results for orders placed or performed in visit on 08/04/17   **Basic metabolic panel FUTURE 2mo   Result Value Ref Range    Sodium 140 133 - 144 mmol/L    Potassium 4.5 3.4 - 5.3 mmol/L    Chloride 106 94 - 109 mmol/L    Carbon Dioxide 27 20 - 32 mmol/L    Anion Gap 7 3 - 14 mmol/L    Glucose 96 70 - 99 mg/dL    Urea Nitrogen 22 7 - 30 mg/dL    Creatinine 1.03 0.52 - 1.04 mg/dL    GFR Estimate 50 (L) >60 mL/min/1.7m2    GFR Estimate If Black 61 >60 mL/min/1.7m2    Calcium 8.9 8.5 - 10.1 mg/dL   **Uric acid FUTURE 2mo   Result Value Ref Range    Uric Acid 6.7 (H) 2.6 - 6.0 mg/dL

## 2017-08-04 NOTE — TELEPHONE ENCOUNTER
Reason for Call:  Other     Detailed comments: Patient has gout, has called pharmacy for steroids rx  Took 600 mg of Ibuprofen this morning, wondering if that should help or what else  She can do    Phone Number Patient can be reached at: Home number on file [phone] (home)    Best Time: anytime    Can we leave a detailed message on this number? YES    Call taken on 8/4/2017 at 8:37 AM by Marzena Null

## 2017-08-05 LAB
ANION GAP SERPL CALCULATED.3IONS-SCNC: 7 MMOL/L (ref 3–14)
BUN SERPL-MCNC: 22 MG/DL (ref 7–30)
CALCIUM SERPL-MCNC: 8.9 MG/DL (ref 8.5–10.1)
CHLORIDE SERPL-SCNC: 106 MMOL/L (ref 94–109)
CO2 SERPL-SCNC: 27 MMOL/L (ref 20–32)
CREAT SERPL-MCNC: 1.03 MG/DL (ref 0.52–1.04)
GFR SERPL CREATININE-BSD FRML MDRD: 50 ML/MIN/1.7M2
GLUCOSE SERPL-MCNC: 96 MG/DL (ref 70–99)
POTASSIUM SERPL-SCNC: 4.5 MMOL/L (ref 3.4–5.3)
SODIUM SERPL-SCNC: 140 MMOL/L (ref 133–144)
URATE SERPL-MCNC: 6.7 MG/DL (ref 2.6–6)

## 2017-08-05 RX ORDER — ALLOPURINOL 100 MG/1
150 TABLET ORAL DAILY
Qty: 135 TABLET | Refills: 3 | Status: SHIPPED | OUTPATIENT
Start: 2017-08-05 | End: 2017-10-19

## 2017-08-05 NOTE — PROGRESS NOTES
The following letter pertains to your most recent diagnostic tests:    -Your gout blood test (uric acid) is above your goal of uric acid less than 6.  Having a uric acid less than 6 optimally prevent gout attacks.       Bottom line:  Increase allopurinol from 100mg daily to 150mg daily (1.5 tablets).        Follow up:  Recheck uric acid level in 2 months.  You can schedule a lab appointment for that purpose.        I have sent a new prescritpion to pharmacy indicating the allopurinol dose change.        Sincerely,    Dr. Cuevas

## 2017-08-12 ENCOUNTER — HEALTH MAINTENANCE LETTER (OUTPATIENT)
Age: 82
End: 2017-08-12

## 2017-08-18 ENCOUNTER — OFFICE VISIT (OUTPATIENT)
Dept: OBGYN | Facility: CLINIC | Age: 82
End: 2017-08-18
Payer: MEDICARE

## 2017-08-18 VITALS
HEIGHT: 63 IN | WEIGHT: 164 LBS | BODY MASS INDEX: 29.06 KG/M2 | DIASTOLIC BLOOD PRESSURE: 62 MMHG | SYSTOLIC BLOOD PRESSURE: 128 MMHG

## 2017-08-18 DIAGNOSIS — N81.11 CYSTOCELE, MIDLINE: Primary | ICD-10-CM

## 2017-08-18 DIAGNOSIS — N81.6 RECTOCELE: ICD-10-CM

## 2017-08-18 PROCEDURE — 99213 OFFICE O/P EST LOW 20 MIN: CPT | Performed by: OBSTETRICS & GYNECOLOGY
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
## 2017-08-18 NOTE — MR AVS SNAPSHOT
"              After Visit Summary   2017    Tanisha Muhammad    MRN: [id number]           Patient Information     Date Of Birth          9/10/1926        Visit Information        Provider Department      2017 9:30 AM Juan Pollard MD [facility] [place]        Today's Diagnoses     Cystocele, midline    -  1    Rectocele           Follow-ups after your visit        Who to contact     If you have questions or need follow up information about today's clinic visit or your schedule please contact [facility] directly at [phone].  Normal or non-critical lab and imaging results will be communicated to you by Wipebookhart, letter or phone within 4 business days after the clinic has received the results. If you do not hear from us within 7 days, please contact the clinic through Wipebookhart or phone. If you have a critical or abnormal lab result, we will notify you by phone as soon as possible.  Submit refill requests through CamioCam or call your pharmacy and they will forward the refill request to us. Please allow 3 business days for your refill to be completed.          Additional Information About Your Visit        MyChart Information     CamioCam lets you send messages to your doctor, view your test results, renew your prescriptions, schedule appointments and more. To sign up, go to [email or web address] . Click on \"Log in\" on the left side of the screen, which will take you to the Welcome page. Then click on \"Sign up Now\" on the right side of the page.     You will be asked to enter the access code listed below, as well as some personal information. Please follow the directions to create your username and password.     Your access code is: [email or web address]  Expires: 2017  4:26 PM     Your access code will  in 90 days. If you need help or a new code, please call your [place] clinic or [phone].        Care EveryWhere ID     This is your Care EveryWhere ID. This " "could be used by other organizations to access your [place] medical records  [id number]        Your Vitals Were     Height Breastfeeding? BMI (Body Mass Index)             5' 3\" (1.6 m) No 29.05 kg/m2          Blood Pressure from Last 3 Encounters:   08/18/17 128/62   08/04/17 164/73   05/11/17 142/65    Weight from Last 3 Encounters:   08/18/17 164 lb (74.4 kg)   08/04/17 164 lb (74.4 kg)   05/01/17 163 lb (73.9 kg)              Today, you had the following     No orders found for display       Primary Care Provider Office Phone # Fax #    Sesar Cuevas [phone] [address] [place] [address]        Equal Access to Services     DENNY PEREZ : Hadii aad ku hadasho Soalber, waaxda luqadaha, qaybta kaalmada adeegyada, theron ballesteros . So [facility] [phone].    ATENCIÓN: Si habla español, tiene a villanueva disposición servicios gratuitos de asistencia lingüística. Llame al [phone].    We comply with applicable federal civil rights laws and [place] laws. We do not discriminate on the basis of race, color, national origin, age, disability sex, sexual orientation or gender identity.            Thank you!     Thank you for choosing [facility]  for your care. Our goal is always to provide you with excellent care. Hearing back from our patients is one way we can continue to improve our services. Please take a few minutes to complete the written survey that you may receive in the mail after your visit with us. Thank you!             Your Updated Medication List - Protect others around you: Learn how to safely use, store and throw away your medicines at www.disposemymeds.org.          This list is accurate as of: 8/18/17  9:48 AM.  Always use your most recent med list.                   Brand Name Dispense Instructions for use Diagnosis    allopurinol 100 MG tablet    ZYLOPRIM    135 tablet    Take 1.5 tablets (150 mg) by mouth daily    Chronic " gout of multiple sites, unspecified cause       IBUPROFEN PO      Take 600 mg by mouth every 6 hours        losartan 50 MG tablet    COZAAR    90 tablet    TAKE 1 TABLET (50 MG) BY MOUTH DAILY    Essential hypertension, benign       VITAMIN B-12 PO      Take 1 tablet by mouth daily        VITAMIN D3 PO      Take 1 tablet by mouth daily

## 2017-09-01 ENCOUNTER — OFFICE VISIT (OUTPATIENT)
Dept: OBGYN | Facility: CLINIC | Age: 82
End: 2017-09-01
Payer: MEDICARE

## 2017-09-01 VITALS
DIASTOLIC BLOOD PRESSURE: 80 MMHG | BODY MASS INDEX: 28.53 KG/M2 | HEIGHT: 63 IN | WEIGHT: 161 LBS | SYSTOLIC BLOOD PRESSURE: 126 MMHG

## 2017-09-01 DIAGNOSIS — R82.90 NONSPECIFIC FINDING ON EXAMINATION OF URINE: ICD-10-CM

## 2017-09-01 DIAGNOSIS — R30.0 DYSURIA: ICD-10-CM

## 2017-09-01 DIAGNOSIS — R39.15 URINARY URGENCY: Primary | ICD-10-CM

## 2017-09-01 LAB
ALBUMIN UR-MCNC: ABNORMAL MG/DL
APPEARANCE UR: ABNORMAL
BACTERIA #/AREA URNS HPF: ABNORMAL /HPF
BILIRUB UR QL STRIP: NEGATIVE
COLOR UR AUTO: YELLOW
GLUCOSE UR STRIP-MCNC: NEGATIVE MG/DL
HGB UR QL STRIP: ABNORMAL
KETONES UR STRIP-MCNC: NEGATIVE MG/DL
LEUKOCYTE ESTERASE UR QL STRIP: ABNORMAL
NITRATE UR QL: NEGATIVE
NON-SQ EPI CELLS #/AREA URNS LPF: ABNORMAL /LPF
PH UR STRIP: 6.5 PH (ref 5–7)
RBC #/AREA URNS AUTO: ABNORMAL /HPF
SOURCE: ABNORMAL
SP GR UR STRIP: 1.02 (ref 1–1.03)
UROBILINOGEN UR STRIP-ACNC: 0.2 EU/DL (ref 0.2–1)
WBC #/AREA URNS AUTO: ABNORMAL /HPF

## 2017-09-01 PROCEDURE — 87086 URINE CULTURE/COLONY COUNT: CPT | Performed by: OBSTETRICS & GYNECOLOGY

## 2017-09-01 PROCEDURE — 81001 URINALYSIS AUTO W/SCOPE: CPT | Performed by: OBSTETRICS & GYNECOLOGY

## 2017-09-01 PROCEDURE — 99213 OFFICE O/P EST LOW 20 MIN: CPT | Performed by: OBSTETRICS & GYNECOLOGY

## 2017-09-01 RX ORDER — NITROFURANTOIN 25; 75 MG/1; MG/1
100 CAPSULE ORAL 2 TIMES DAILY
Qty: 14 CAPSULE | Refills: 0 | Status: SHIPPED | OUTPATIENT
Start: 2017-09-01 | End: 2017-10-13
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
## 2017-09-01 NOTE — MR AVS SNAPSHOT
"              After Visit Summary   9/1/2017    Tanisha Muhammad    MRN: [id number]           Patient Information     Date Of Birth          9/10/1926        Visit Information        Provider Department      9/1/2017 8:40 AM Roxi Thakur MD [facility]        Today's Diagnoses     Urinary urgency    -  1    Nonspecific finding on examination of urine        Dysuria          Care Instructions    Will start oral macrobid for UTI today but will also send urine culture and follow up on Monday.  Encouraged hydration and discussed precautions for over the weekend.          Follow-ups after your visit        Follow-up notes from your care team     Return if symptoms worsen or fail to improve.      Who to contact     If you have questions or need follow up information about today's clinic visit or your schedule please contact [facility] directly at [phone].  Normal or non-critical lab and imaging results will be communicated to you by MyChart, letter or phone within 4 business days after the clinic has received the results. If you do not hear from us within 7 days, please contact the clinic through MyChart or phone. If you have a critical or abnormal lab result, we will notify you by phone as soon as possible.  Submit refill requests through [facility] or call your pharmacy and they will forward the refill request to us. Please allow 3 business days for your refill to be completed.          Additional Information About Your Visit        MyChart Information     [facility] [employer] lets you send messages to your doctor, view your test results, renew your prescriptions, schedule appointments and more. To sign up, go to [email or web address] . Click on \"Log in\" on the left side of the screen, which will take you to the Welcome page. Then click on \"Sign up Now\" on the right side of the page.     You will be asked to enter the access code listed below, as well as some personal information. " "Please follow the directions to create your username and password.     Your access code is: [email or web address]  Expires: 2017  4:26 PM     Your access code will  in 90 days. If you need help or a new code, please call your [facility] clinic or [phone].        Care EveryWhere ID     This is your Care EveryWhere ID. This could be used by other organizations to access your [facility] medical records  [id number]        Your Vitals Were     Height Breastfeeding? BMI (Body Mass Index)             5' 3\" (1.6 m) No 28.52 kg/m2          Blood Pressure from Last 3 Encounters:   17 126/80   17 128/62   17 164/73    Weight from Last 3 Encounters:   17 161 lb (73 kg)   17 164 lb (74.4 kg)   17 164 lb (74.4 kg)              We Performed the Following     UA with Microscopic     Urine Culture Aerobic Bacterial          Today's Medication Changes          These changes are accurate as of: 17 12:22 PM.  If you have any questions, ask your nurse or doctor.               Start taking these medicines.        Dose/Directions    nitroFURantoin (macrocrystal-monohydrate) 100 MG capsule   Commonly known as:  MACROBID   Used for:  Dysuria   Started by:  Roxi Thakur MD        Dose:  100 mg   Take 1 capsule (100 mg) by mouth 2 times daily   Quantity:  14 capsule   Refills:  0            Where to get your medicines      These medications were sent to [facility] [place], [place] - [address], [address]  [address], [address], [facility] [address]     Phone:  [phone]     nitroFURantoin (macrocrystal-monohydrate) 100 MG capsule                Primary Care Provider Office Phone # Fax #    Sesar Cuevas [phone]       [facility] [address]  [facility] [address]        Equal Access to Services     MARCO PEREZ AH: Hadii debby carias hadasho Soomaali, waaxda luqadaha, qaybta kaalmada adevadimyaalise, theron pierre " lamahsa fitzgerald. So [facility] [phone].    ATENCIÓN: Si habla derek, tiene a villanueva disposición servicios gratuitos de asistencia lingüística. Tavia al [phone].    We comply with applicable federal civil rights laws and [place] laws. We do not discriminate on the basis of race, color, national origin, age, disability sex, sexual orientation or gender identity.            Thank you!     Thank you for choosing [facility]  for your care. Our goal is always to provide you with excellent care. Hearing back from our patients is one way we can continue to improve our services. Please take a few minutes to complete the written survey that you may receive in the mail after your visit with us. Thank you!             Your Updated Medication List - Protect others around you: Learn how to safely use, store and throw away your medicines at www.disposemymeds.org.          This list is accurate as of: 9/1/17 12:22 PM.  Always use your most recent med list.                   Brand Name Dispense Instructions for use Diagnosis    allopurinol 100 MG tablet    ZYLOPRIM    135 tablet    Take 1.5 tablets (150 mg) by mouth daily    Chronic gout of multiple sites, unspecified cause       IBUPROFEN PO      Take 600 mg by mouth every 6 hours        losartan 50 MG tablet    COZAAR    90 tablet    TAKE 1 TABLET (50 MG) BY MOUTH DAILY    Essential hypertension, benign       nitroFURantoin (macrocrystal-monohydrate) 100 MG capsule    MACROBID    14 capsule    Take 1 capsule (100 mg) by mouth 2 times daily    Dysuria       VITAMIN B-12 PO      Take 1 tablet by mouth daily        VITAMIN D3 PO      Take 1 tablet by mouth daily

## 2017-09-01 NOTE — PATIENT INSTRUCTIONS
Will start oral macrobid for UTI today but will also send urine culture and follow up on Monday.  Encouraged hydration and discussed precautions for over the weekend.

## 2017-09-02 LAB
BACTERIA SPEC CULT: NORMAL
SPECIMEN SOURCE: NORMAL

## 2017-09-05 ENCOUNTER — TELEPHONE (OUTPATIENT)
Dept: OBGYN | Facility: CLINIC | Age: 82
End: 2017-09-05

## 2017-09-05 DIAGNOSIS — N39.0 UTI (URINARY TRACT INFECTION): Primary | ICD-10-CM

## 2017-09-05 NOTE — TELEPHONE ENCOUNTER
Pt informed. Pt states that Dr. Thakur did not do a pelvic exam to check pessary. Pt states that her symptoms started 3 days after the pessary was changed. Pt has had symptoms for ten days. Macrobid is helping. Pt has the urge to void but only able to go a few drops. Pt transferred to scheduling to make lab only appointment for UA. Routing to Dr. Pollard. LONA

## 2017-09-05 NOTE — TELEPHONE ENCOUNTER
"Notes Recorded by Roxi Thakur MD on 9/5/2017 at 8:54 AM  Please inform of results; no infection in her urine based on the culture.    Called pt and informed of results. Pt stated \"Ok then you have to refer me to a urologist.\" Pt also wondering if her symptoms could be due to her pessary. Note routed to Dr. Pollard (as pt stated he is her normal MD and wants his opinion) to review and advise-Ok for referral? Could pts symptoms be due to pessary?   POC note 9/1/17: \"Here today for possible UTI.  Has been somewhat uncomfortable since pessary change with Dr. Pollard on 8/18.  Has been having some lower abdominal cramping and urinary frequency. Has been getting up several times per night and voiding very little.  Some burning with urination.  No blood in urine.  No fever/chills.  Denies back or flank pain.  Has been years since she had a UTI.\"  "

## 2017-09-05 NOTE — TELEPHONE ENCOUNTER
Called pt and informed of Dr. Pollard's note below. Pt stated understanding and had no further questions.

## 2017-09-05 NOTE — TELEPHONE ENCOUNTER
If things don't get completely better after antibiotics should have recheck of pessary position as well.

## 2017-09-05 NOTE — TELEPHONE ENCOUNTER
I doubt its from the pessary since I replaced the same one and didn't change the size. Blaze did exam so we know there isn't something odd about it's position. The UA was definitely abnormal and UC no UTI. May want to repeat the UA. If abnormal again, the urologist would do a cystoscopy to see if something is going on. Can do the UA here or with them.

## 2017-09-06 ENCOUNTER — OFFICE VISIT (OUTPATIENT)
Dept: OBGYN | Facility: CLINIC | Age: 82
End: 2017-09-06
Payer: MEDICARE

## 2017-09-06 VITALS
HEART RATE: 76 BPM | DIASTOLIC BLOOD PRESSURE: 70 MMHG | HEIGHT: 63 IN | BODY MASS INDEX: 28.53 KG/M2 | SYSTOLIC BLOOD PRESSURE: 130 MMHG | WEIGHT: 161 LBS

## 2017-09-06 DIAGNOSIS — R30.0 DYSURIA: Primary | ICD-10-CM

## 2017-09-06 DIAGNOSIS — N81.10 CYSTOCELE WITH RECTOCELE: ICD-10-CM

## 2017-09-06 DIAGNOSIS — N39.0 UTI (URINARY TRACT INFECTION): ICD-10-CM

## 2017-09-06 DIAGNOSIS — T83.9XXA PROBLEM WITH VAGINAL PESSARY, INITIAL ENCOUNTER (H): ICD-10-CM

## 2017-09-06 DIAGNOSIS — N81.6 CYSTOCELE WITH RECTOCELE: ICD-10-CM

## 2017-09-06 LAB
ALBUMIN UR-MCNC: ABNORMAL MG/DL
APPEARANCE UR: CLEAR
BILIRUB UR QL STRIP: NEGATIVE
COLOR UR AUTO: YELLOW
GLUCOSE UR STRIP-MCNC: NEGATIVE MG/DL
HGB UR QL STRIP: ABNORMAL
KETONES UR STRIP-MCNC: NEGATIVE MG/DL
LEUKOCYTE ESTERASE UR QL STRIP: NEGATIVE
NITRATE UR QL: NEGATIVE
PH UR STRIP: 6 PH (ref 5–7)
SOURCE: ABNORMAL
SP GR UR STRIP: 1.01 (ref 1–1.03)
UROBILINOGEN UR STRIP-ACNC: 0.2 EU/DL (ref 0.2–1)

## 2017-09-06 PROCEDURE — 87086 URINE CULTURE/COLONY COUNT: CPT | Performed by: NURSE PRACTITIONER

## 2017-09-06 PROCEDURE — 99213 OFFICE O/P EST LOW 20 MIN: CPT | Performed by: NURSE PRACTITIONER

## 2017-09-06 PROCEDURE — 81003 URINALYSIS AUTO W/O SCOPE: CPT | Performed by: NURSE PRACTITIONER
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
## 2017-09-06 NOTE — MR AVS SNAPSHOT
"              After Visit Summary   2017    Tanisha Muhammad    MRN: [id number]           Patient Information     Date Of Birth          9/10/1926        Visit Information        Provider Department      2017 9:30 AM Isi Queen APRN CNP [facility] [place]        Today's Diagnoses     Dysuria    -  1       Follow-ups after your visit        Who to contact     If you have questions or need follow up information about today's clinic visit or your schedule please contact [facility] directly at [phone].  Normal or non-critical lab and imaging results will be communicated to you by [employer], letter or phone within 4 business days after the clinic has received the results. If you do not hear from us within 7 days, please contact the clinic through [employer] or phone. If you have a critical or abnormal lab result, we will notify you by phone as soon as possible.  Submit refill requests through [employer] or call your pharmacy and they will forward the refill request to us. Please allow 3 business days for your refill to be completed.          Additional Information About Your Visit        MyChart Information     [employer] lets you send messages to your doctor, view your test results, renew your prescriptions, schedule appointments and more. To sign up, go to [email or web address] . Click on \"Log in\" on the left side of the screen, which will take you to the Welcome page. Then click on \"Sign up Now\" on the right side of the page.     You will be asked to enter the access code listed below, as well as some personal information. Please follow the directions to create your username and password.     Your access code is: [email or web address]  Expires: 2017  4:26 PM     Your access code will  in 90 days. If you need help or a new code, please call your [place] [facility] clinic or [phone].        Care EveryWhere ID     This is your Care EveryWhere ID. This could be used by other " "organizations to access your [place] medical records  [id number]        Your Vitals Were     Pulse Height BMI (Body Mass Index)             76 5' 3\" (1.6 m) 28.52 kg/m2          Blood Pressure from Last 3 Encounters:   09/06/17 130/70   09/01/17 126/80   08/18/17 128/62    Weight from Last 3 Encounters:   09/06/17 161 lb (73 kg)   09/01/17 161 lb (73 kg)   08/18/17 164 lb (74.4 kg)              We Performed the Following     UA without Microscopic        Primary Care Provider Office Phone # Fax #    Sesar Cuevas [phone]       [facility] [address] [address]        Equal Access to Services     MARCO PEREZ : Hadii debby robleso Soalber, waaxda luqadaha, qaybta kaalmada adeegyada, theron burdick hayalessandro ballesteros . So [facility] [phone].    ATENCIÓN: Si habla español, tiene a villanueva disposición servicios gratuitos de asistencia lingüística. Llame al [phone].    We comply with applicable federal civil rights laws and [place] laws. We do not discriminate on the basis of race, color, national origin, age, disability sex, sexual orientation or gender identity.            Thank you!     [facility] you [facility]  [facility] your care. Our goal is always to provide you with excellent care. Hearing back from our patients is one way we can continue to improve our services. Please take a few minutes to complete the written survey that you may receive in the mail after your visit with us. Thank you!             Your Updated Medication List - Protect others around you: Learn how to safely use, store and throw away your medicines at www.disposemymeds.org.          This list is accurate as of: 9/6/17 10:07 AM.  Always use your most recent med list.                   Brand Name Dispense Instructions for use Diagnosis    allopurinol 100 MG tablet    ZYLOPRIM    135 tablet    Take 1.5 tablets (150 mg) by mouth daily    Chronic gout of " multiple sites, unspecified cause       IBUPROFEN PO      Take 600 mg by mouth every 6 hours        losartan 50 MG tablet    COZAAR    90 tablet    TAKE 1 TABLET (50 MG) BY MOUTH DAILY    Essential hypertension, benign       nitroFURantoin (macrocrystal-monohydrate) 100 MG capsule    MACROBID    14 capsule    Take 1 capsule (100 mg) by mouth 2 times daily    Dysuria       VITAMIN B-12 PO      Take 1 tablet by mouth daily        VITAMIN D3 PO      Take 1 tablet by mouth daily

## 2017-09-07 ENCOUNTER — TELEPHONE (OUTPATIENT)
Dept: OBGYN | Facility: CLINIC | Age: 82
End: 2017-09-07

## 2017-09-07 DIAGNOSIS — R39.15 URINARY URGENCY: Primary | ICD-10-CM

## 2017-09-07 LAB
BACTERIA SPEC CULT: NO GROWTH
SPECIMEN SOURCE: NORMAL

## 2017-09-07 NOTE — TELEPHONE ENCOUNTER
[facility] Called pt and informed her of messages below. Gave [facility] phone number ([phone]) for pt to call tomorrow to schedule appt if they do not contact her tomorrow. Pt verbalized understanding of information.      Closing encounter.

## 2017-09-07 NOTE — TELEPHONE ENCOUNTER
Urine culture results back, no growth. Pt requesting urology referral. Routing to Dr. Pollard to review and advise.

## 2017-09-07 NOTE — TELEPHONE ENCOUNTER
"Pt calling for urine culture results. Informed pt they are not back yet. Pt's pessary checked at visit with Isi Queen 9/6/17. Pt states still uncomfortable urinating this morning, \"feels like she has to go but no flow.\" Pt states at this point she wants to see a urologist and states she does not want to come back to the clinic until after she sees a urologist. Pt wondering what urologist Dr. Pollard recommends. Note routed to Dr. Pollard to review and advise.   "

## 2017-10-13 ENCOUNTER — OFFICE VISIT (OUTPATIENT)
Dept: FAMILY MEDICINE | Facility: CLINIC | Age: 82
End: 2017-10-13
Payer: MEDICARE

## 2017-10-13 VITALS
TEMPERATURE: 97.7 F | DIASTOLIC BLOOD PRESSURE: 86 MMHG | HEIGHT: 63 IN | SYSTOLIC BLOOD PRESSURE: 138 MMHG | HEART RATE: 82 BPM | OXYGEN SATURATION: 97 %

## 2017-10-13 DIAGNOSIS — I10 ESSENTIAL HYPERTENSION, BENIGN: ICD-10-CM

## 2017-10-13 DIAGNOSIS — E53.9 VITAMIN B-COMPLEX DEFICIENCY: ICD-10-CM

## 2017-10-13 DIAGNOSIS — M10.00 IDIOPATHIC GOUT, UNSPECIFIED CHRONICITY, UNSPECIFIED SITE: ICD-10-CM

## 2017-10-13 DIAGNOSIS — N18.30 CKD (CHRONIC KIDNEY DISEASE) STAGE 3, GFR 30-59 ML/MIN (H): Primary | ICD-10-CM

## 2017-10-13 DIAGNOSIS — Z23 NEED FOR PROPHYLACTIC VACCINATION AND INOCULATION AGAINST INFLUENZA: ICD-10-CM

## 2017-10-13 LAB
ERYTHROCYTE [DISTWIDTH] IN BLOOD BY AUTOMATED COUNT: 13 % (ref 10–15)
HCT VFR BLD AUTO: 35.6 % (ref 35–47)
HGB BLD-MCNC: 12.4 G/DL (ref 11.7–15.7)
MCH RBC QN AUTO: 30.3 PG (ref 26.5–33)
MCHC RBC AUTO-ENTMCNC: 34.8 G/DL (ref 31.5–36.5)
MCV RBC AUTO: 87 FL (ref 78–100)
PLATELET # BLD AUTO: 114 10E9/L (ref 150–450)
RBC # BLD AUTO: 4.09 10E12/L (ref 3.8–5.2)
WBC # BLD AUTO: 4.5 10E9/L (ref 4–11)

## 2017-10-13 PROCEDURE — 80053 COMPREHEN METABOLIC PANEL: CPT | Performed by: INTERNAL MEDICINE

## 2017-10-13 PROCEDURE — 90662 IIV NO PRSV INCREASED AG IM: CPT | Performed by: INTERNAL MEDICINE

## 2017-10-13 PROCEDURE — 99214 OFFICE O/P EST MOD 30 MIN: CPT | Mod: 25 | Performed by: INTERNAL MEDICINE

## 2017-10-13 PROCEDURE — 36415 COLL VENOUS BLD VENIPUNCTURE: CPT | Performed by: INTERNAL MEDICINE

## 2017-10-13 PROCEDURE — 82607 VITAMIN B-12: CPT | Performed by: INTERNAL MEDICINE

## 2017-10-13 PROCEDURE — G0008 ADMIN INFLUENZA VIRUS VAC: HCPCS | Performed by: INTERNAL MEDICINE

## 2017-10-13 PROCEDURE — 85027 COMPLETE CBC AUTOMATED: CPT | Performed by: INTERNAL MEDICINE

## 2017-10-13 PROCEDURE — 84550 ASSAY OF BLOOD/URIC ACID: CPT | Performed by: INTERNAL MEDICINE

## 2017-10-13 NOTE — LETTER
[facility]  [address] [facility]  [address]  [place]  [address]  Tel: [phone]    October 16, 2017    Tanisha Muhammad  [address]   [facility] [address]        Dear Ms. Muhammad,    The following letter pertains to your most recent diagnostic tests:    -Your vitamin B12 level is normal.     -Your gout blood test (uric acid) is above your goal of uric acid less than 6.  Having a uric acid less than 6 optimally prevent gout attacks.  Are you taking allopurinol?  If so you should contact me so we can discuss increasing your dose.  If you are not taking allopurinol, then we should increase your dose from 150mg daily to 300mg daily.  I can send a new prescritpion for 300mg tablets to your pharmacy if contact me and need this dose.  We should recheck your uric acid level when you return from [place].      -Liver and gallbladder tests are normal for you. (ALT,AST, Alk phos, bilirubin), kidney function is stable for you (Creatinine, GFR), Sodium is normal, Potassium is normal for you, Calcium is normal for you, Glucose (blood sugar) is high, but I am not certain you were fasting for the test.      -Your complete blood counts including your hemoglobin returned normal for you with the exception of mild and stably low platelets which we have been monitoring over the years.       Follow up:  Call me if you need a new script for a new dose of allopurinol.  Return for a uric acid level check when you get back from [place].        Sincerely,    Dr. Cuevas / robyn        Enclosure: Lab Results      Resulted Orders   Uric acid   Result Value Ref Range    Uric Acid 8.0 (H) 2.6 - 6.0 mg/dL   Comprehensive metabolic panel   Result Value Ref Range    Sodium 141 133 - 144 mmol/L    Potassium 3.9 3.4 - 5.3 mmol/L    Chloride 109 94 - 109 mmol/L    Carbon Dioxide 26 20 - 32 mmol/L    Anion Gap 6 3 - 14 mmol/L    Glucose 143 (H) 70 - 99 mg/dL      Comment:      Non Fasting    Urea Nitrogen 27 7 - 30 mg/dL     Creatinine 1.18 (H) 0.52 - 1.04 mg/dL    GFR Estimate 43 (L) >60 mL/min/1.7m2      Comment:      Non  GFR Calc    GFR Estimate If Black 52 (L) >60 mL/min/1.7m2      Comment:       GFR Calc    Calcium 9.5 8.5 - 10.1 mg/dL    Bilirubin Total 0.3 0.2 - 1.3 mg/dL    Albumin 4.0 3.4 - 5.0 g/dL    Protein Total 7.3 6.8 - 8.8 g/dL    Alkaline Phosphatase 80 40 - 150 U/L    ALT 24 0 - 50 U/L    AST 18 0 - 45 U/L   CBC with platelets   Result Value Ref Range    WBC 4.5 4.0 - 11.0 10e9/L    RBC Count 4.09 3.8 - 5.2 10e12/L    Hemoglobin 12.4 11.7 - 15.7 g/dL    Hematocrit 35.6 35.0 - 47.0 %    MCV 87 78 - 100 fl    MCH 30.3 26.5 - 33.0 pg    MCHC 34.8 31.5 - 36.5 g/dL    RDW 13.0 10.0 - 15.0 %    Platelet Count 114 (L) 150 - 450 10e9/L   Vitamin B12   Result Value Ref Range    Vitamin B12 980 193 - 986 pg/mL
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
## 2017-10-13 NOTE — MR AVS SNAPSHOT
"              After Visit Summary   10/13/2017    Tanisha Muhammad    MRN: [id number]           Patient Information     Date Of Birth          9/10/1926        Visit Information        Provider Department      10/13/2017 4:30 PM Sesar Cuevas MD [facility]        Today's Diagnoses     CKD (chronic kidney disease) stage 3, GFR 30-59 ml/min    -  1    Essential hypertension, benign        Idiopathic gout, unspecified chronicity, unspecified site        Need for prophylactic vaccination and inoculation against influenza        Vitamin B-complex deficiency           Follow-ups after your visit        Who to contact     If you have questions or need follow up information about today's clinic visit or your schedule please contact [facility] directly at [phone].  Normal or non-critical lab and imaging results will be communicated to you by MyChart, letter or phone within 4 business days after the clinic has received the results. If you do not hear from us within 7 days, please contact the clinic through MyChart or phone. If you have a critical or abnormal lab result, we will notify you by phone as soon as possible.  Submit refill requests through [employer] or call your pharmacy and they will forward the refill request to us. Please allow 3 business days for your refill to be completed.          Additional Information About Your Visit        MyChart Information     [employer] lets you send messages to your doctor, view your test results, renew your prescriptions, schedule appointments and more. To sign up, go to [email or web address] . Click on \"Log in\" on the left side of the screen, which will take you to the Welcome page. Then click on \"Sign up Now\" on the right side of the page.     You will be asked to enter the access code listed below, as well as some personal information. Please follow the directions to create your username and password.     Your access code is: [email or web address]  Expires: " "2017  4:26 PM     Your access code will  in 90 days. If you need help or a new code, please call your [place] clinic or [phone].        Care EveryWhere ID     This is your Care EveryWhere ID. This could be used by other organizations to access your [place] medical records  [id number]        Your Vitals Were     Pulse Temperature Height Pulse Oximetry Breastfeeding?       82 97.7  F (36.5  C) (Tympanic) 5' 3\" (1.6 m) 97% No        Blood Pressure from Last 3 Encounters:   10/13/17 138/86   17 130/70   17 126/80    Weight from Last 3 Encounters:   17 161 lb (73 kg)   17 161 lb (73 kg)   17 164 lb (74.4 kg)              We Performed the Following     ADMIN INFLUENZA  (For MEDICARE Patients ONLY) []     CBC with platelets     Comprehensive metabolic panel     FLU VACCINE, INCREASED ANTIGEN, PRESV FREE, AGE 65+ [64349]     Uric acid     Vitamin B12        Primary Care Provider Office Phone # Fax #    Sesar Cuevas [phone]       [facility] [address]  [place] [address]        Equal Access to Services     MARCO PEREZ : Hadii aad ku hadasho Soomaali, waaxda luqadaha, qaybta kaalmada adeegyada, waxay idiin hayaan adeeg ignacio lamahsa . So [facility] [phone].    ATENCIÓN: Si habla español, tiene a villanueva disposición servicios gratuitos de asistencia lingüística. Llame al [phone].    We comply with applicable federal civil rights laws and [place] laws. We do not discriminate on the basis of race, color, national origin, age, disability, sex, sexual orientation, or gender identity.            Thank you!     Thank you for choosing [facility]  for your care. Our goal is always to provide you with excellent care. Hearing back from our patients is one way we can continue to improve our services. Please take a few minutes to complete the written survey that you may receive in the mail after your visit with us. Thank " you!             Your Updated Medication List - Protect others around you: Learn how to safely use, store and throw away your medicines at www.disposemymeds.org.          This list is accurate as of: 10/13/17  6:00 PM.  Always use your most recent med list.                   Brand Name Dispense Instructions for use Diagnosis    allopurinol 100 MG tablet    ZYLOPRIM    135 tablet    Take 1.5 tablets (150 mg) by mouth daily    Chronic gout of multiple sites, unspecified cause       losartan 50 MG tablet    COZAAR    90 tablet    TAKE 1 TABLET (50 MG) BY MOUTH DAILY    Essential hypertension, benign       VITAMIN B-12 PO      Take 1 tablet by mouth daily

## 2017-10-13 NOTE — NURSING NOTE
"Chief Complaint   Patient presents with     RECHECK       Initial /78 (BP Location: Right arm, Cuff Size: Adult Regular)  Pulse 82  Temp 97.7  F (36.5  C) (Tympanic)  Ht 5' 3\" (1.6 m)  SpO2 97%  Breastfeeding? No Estimated body mass index is 28.52 kg/(m^2) as calculated from the following:    Height as of 9/6/17: 5' 3\" (1.6 m).    Weight as of 9/6/17: 161 lb (73 kg).  Medication Reconciliation: complete  Katerin Bonner MA      "

## 2017-10-14 LAB
ALBUMIN SERPL-MCNC: 4 G/DL (ref 3.4–5)
ALP SERPL-CCNC: 80 U/L (ref 40–150)
ALT SERPL W P-5'-P-CCNC: 24 U/L (ref 0–50)
ANION GAP SERPL CALCULATED.3IONS-SCNC: 6 MMOL/L (ref 3–14)
AST SERPL W P-5'-P-CCNC: 18 U/L (ref 0–45)
BILIRUB SERPL-MCNC: 0.3 MG/DL (ref 0.2–1.3)
BUN SERPL-MCNC: 27 MG/DL (ref 7–30)
CALCIUM SERPL-MCNC: 9.5 MG/DL (ref 8.5–10.1)
CHLORIDE SERPL-SCNC: 109 MMOL/L (ref 94–109)
CO2 SERPL-SCNC: 26 MMOL/L (ref 20–32)
CREAT SERPL-MCNC: 1.18 MG/DL (ref 0.52–1.04)
GFR SERPL CREATININE-BSD FRML MDRD: 43 ML/MIN/1.7M2
GLUCOSE SERPL-MCNC: 143 MG/DL (ref 70–99)
POTASSIUM SERPL-SCNC: 3.9 MMOL/L (ref 3.4–5.3)
PROT SERPL-MCNC: 7.3 G/DL (ref 6.8–8.8)
SODIUM SERPL-SCNC: 141 MMOL/L (ref 133–144)
URATE SERPL-MCNC: 8 MG/DL (ref 2.6–6)
VIT B12 SERPL-MCNC: 980 PG/ML (ref 193–986)

## 2017-10-15 NOTE — PROGRESS NOTES
The following letter pertains to your most recent diagnostic tests:    -Your vitamin B12 level is normal.     -Your gout blood test (uric acid) is above your goal of uric acid less than 6.  Having a uric acid less than 6 optimally prevent gout attacks.  Are you taking allopurinol?  If so you should contact me so we can discuss increasing your dose.  If you are not taking allopurinol, then we should increase your dose from 150mg daily to 300mg daily.  I can send a new prescritpion for 300mg tablets to your pharmacy if contact me and need this dose.  We should recheck your uric acid level when you return from [place].      -Liver and gallbladder tests are normal for you. (ALT,AST, Alk phos, bilirubin), kidney function is stable for you (Creatinine, GFR), Sodium is normal, Potassium is normal for you, Calcium is normal for you, Glucose (blood sugar) is high, but I am not certain you were fasting for the test.      -Your complete blood counts including your hemoglobin returned normal for you with the exception of mild and stably low platelets which we have been monitoring over the years.       Follow up:  Call me if you need a new script for a new dose of allopurinol.  Return for a uric acid level check when you get back from [place].        Sincerely,    Dr. Cuevas

## 2017-10-19 DIAGNOSIS — M1A.09X0 CHRONIC GOUT OF MULTIPLE SITES, UNSPECIFIED CAUSE: ICD-10-CM

## 2017-10-23 RX ORDER — ALLOPURINOL 100 MG/1
150 TABLET ORAL DAILY
Qty: 135 TABLET | Refills: 3 | Status: SHIPPED | OUTPATIENT
Start: 2017-10-23 | End: 2018-07-31

## 2017-10-23 NOTE — TELEPHONE ENCOUNTER
MD please address recent labs dated 10-13-17 and then renew medication based on that .Laura Pardo RN

## 2018-07-26 ENCOUNTER — OFFICE VISIT (OUTPATIENT)
Dept: OBGYN | Facility: CLINIC | Age: 83
End: 2018-07-26
Payer: MEDICARE

## 2018-07-26 VITALS — BODY MASS INDEX: 29.23 KG/M2 | WEIGHT: 165 LBS | DIASTOLIC BLOOD PRESSURE: 70 MMHG | SYSTOLIC BLOOD PRESSURE: 112 MMHG

## 2018-07-26 DIAGNOSIS — N81.10 CYSTOCELE WITH RECTOCELE: Primary | ICD-10-CM

## 2018-07-26 DIAGNOSIS — N81.6 CYSTOCELE WITH RECTOCELE: Primary | ICD-10-CM

## 2018-07-26 PROCEDURE — 99213 OFFICE O/P EST LOW 20 MIN: CPT | Performed by: OBSTETRICS & GYNECOLOGY

## 2018-07-26 NOTE — MR AVS SNAPSHOT
After Visit Summary   7/26/2018    Tanisha Muhammad    MRN: [id number]           Patient Information     Date Of Birth          9/10/1926        Visit Information        Provider Department      7/26/2018 3:00 PM Juan Pollard MD [facility] [place]        Today's Diagnoses     Cystocele with rectocele    -  1       Follow-ups after your visit        Who to contact     If you have questions or need follow up information about today's clinic visit or your schedule please contact [facility] [place] directly at [phone].  Normal or non-critical lab and imaging results will be communicated to you by MyChart, letter or phone within 4 business days after the clinic has received the results. If you do not hear from us within 7 days, please contact the clinic through MyChart or phone. If you have a critical or abnormal lab result, we will notify you by phone as soon as possible.  Submit refill requests through [employer] or call your pharmacy and they will forward the refill request to us. Please allow 3 business days for your refill to be completed.          Additional Information About Your Visit        Care EveryWhere ID     This is your Care EveryWhere ID. This could be used by other organizations to access your [place] medical records  [id number]        Your Vitals Were     Breastfeeding? BMI (Body Mass Index)                No 29.23 kg/m2           Blood Pressure from Last 3 Encounters:   07/26/18 112/70   10/13/17 138/86   09/06/17 130/70    Weight from Last 3 Encounters:   07/26/18 165 lb (74.8 kg)   09/06/17 161 lb (73 kg)   09/01/17 161 lb (73 kg)              Today, you had the following     No orders found for display       Primary Care Provider Office Phone # Fax #    Sesar Cuevas [phone] [address]  [place] [address]        Equal Access to Services     MARCO PEREZ : radha Olvera,  mimi fidelbensonmarcia gonzalezhubert tabathain hayaan adeeg kharash la'aan ah. So [facility] [phone].    ATENCIÓN: Si dejan reed, tiene a villanueva disposición servicios gratuitos de asistencia lingüística. Tavia al [phone].    We comply with applicable federal civil rights laws and [place] laws. We do not discriminate on the basis of race, color, national origin, age, disability, sex, sexual orientation, or gender identity.            Thank you!     Thank you for choosing [facility] FOR [facility]  for your care. Our goal is always to provide you with excellent care. Hearing back from our patients is one way we can continue to improve our services. Please take a few minutes to complete the written survey that you may receive in the mail after your visit with us. Thank you!             Your Updated Medication List - Protect others around you: Learn how to safely use, store and throw away your medicines at www.disposemymeds.org.          This list is accurate as of 7/26/18  4:34 PM.  Always use your most recent med list.                   Brand Name Dispense Instructions for use Diagnosis    allopurinol 100 MG tablet    ZYLOPRIM    135 tablet    Take 1.5 tablets (150 mg) by mouth daily    Chronic gout of multiple sites, unspecified cause       losartan 50 MG tablet    COZAAR    90 tablet    TAKE 1 TABLET (50 MG) BY MOUTH DAILY    Essential hypertension, benign       VITAMIN B-12 PO      Take 1 tablet by mouth daily
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
## 2018-07-31 ENCOUNTER — OFFICE VISIT (OUTPATIENT)
Dept: FAMILY MEDICINE | Facility: CLINIC | Age: 83
End: 2018-07-31
Payer: MEDICARE

## 2018-07-31 VITALS
TEMPERATURE: 98 F | HEART RATE: 72 BPM | OXYGEN SATURATION: 97 % | DIASTOLIC BLOOD PRESSURE: 74 MMHG | HEIGHT: 63 IN | BODY MASS INDEX: 29.36 KG/M2 | SYSTOLIC BLOOD PRESSURE: 136 MMHG | WEIGHT: 165.7 LBS

## 2018-07-31 DIAGNOSIS — M1A.09X0 CHRONIC GOUT OF MULTIPLE SITES, UNSPECIFIED CAUSE: ICD-10-CM

## 2018-07-31 DIAGNOSIS — M75.102 ROTATOR CUFF SYNDROME, LEFT: Primary | ICD-10-CM

## 2018-07-31 PROCEDURE — 99213 OFFICE O/P EST LOW 20 MIN: CPT | Performed by: NURSE PRACTITIONER

## 2018-07-31 RX ORDER — ALLOPURINOL 100 MG/1
TABLET ORAL
COMMUNITY
Start: 2018-07-31 | End: 2018-08-21

## 2018-07-31 NOTE — PROGRESS NOTES
HPI    SUBJECTIVE:   Tanisha Muhammad is a 89 year old female who presents to clinic today for the following health issues:      Chief Complaint   Patient presents with     Shoulder left     pain in L shoulder that started yesterday evening         Yesterday afternoon started to have left shoulder pain   Can't reach behind her back nor raise her arm much at all    No paresthesias   Mild neck soreness   tried heat and tylenol 650 mg, this helps with pain  No injury       Past Medical History:   Diagnosis Date     Cystocele 03/2010    midline     Elevated glucose      Gout 03/2010     [place]'s disease 2010     Inactive Ménière's disease     Left ear deafness     Post-menopausal bleeding 01/02/2014    possibly due to pessary, area of irritated skin visualized on exam with active light bleeding posterior to cervix. Will need bx if continues following estrogen cream and leaving pessary out for 3-4 weeks.      Rectocele      Trigeminal neuralgia 2015     Unspecified essential hypertension      Unspecified venous (peripheral) insufficiency      Family History   Problem Relation Age of Onset     Cancer Mother      kidney     Hypertension Mother      HEART DISEASE Father      Respiratory Father      emphysema     HEART DISEASE Brother      MI     Arthritis Sister      fibromyalgia     HEART DISEASE Brother      stent 4     Cancer Brother      renal      Past Surgical History:   Procedure Laterality Date     C DENTAL CONSULTATION      Dr Kahlil CEJA EYE EXAM, EST PATIENT,COMPREHESV      DR white     CATARACT IOL, RT/LT      Left     LASER YAG CAPSULOTOMY Left 10/2/2015    Procedure: LASER YAG CAPSULOTOMY;  Surgeon: Pieter Rios MD;  Location: [facility]     Social History   Substance Use Topics     Smoking status: Never Smoker     Smokeless tobacco: Never Used     Alcohol use 0.0 oz/week     0 Standard drinks or equivalent per week      Comment: one glass wine a month     Current Outpatient Prescriptions   Medication Sig  "Dispense Refill     allopurinol (ZYLOPRIM) 100 MG tablet Patient reports only taking this daily prn when she knows she is eating foods that might incur gout flare-ups       Cyanocobalamin (VITAMIN B-12 PO) Take 1 tablet by mouth daily        losartan (COZAAR) 50 MG tablet TAKE 1 TABLET (50 MG) BY MOUTH DAILY 90 tablet 3     Allergies   Allergen Reactions     Indomethacin      Cognitive impairment     Keflex [Cephalexin]      HIVES     Sulfamethoxazole-Trimethoprim        Reviewed and updated as needed this visit by clinical staff and provider     ROS  Detailed as above       /74  Pulse 72  Temp 98  F (36.7  C) (Oral)  Ht 5' 3\" (1.6 m)  Wt 165 lb 11.2 oz (75.2 kg)  SpO2 97%  Breastfeeding? No  BMI 29.35 kg/m2      Physical Exam   Constitutional: She is well-developed, well-nourished, and in no distress.   HENT:   Head: Normocephalic.   Neck: Normal range of motion.   Pulmonary/Chest: Effort normal.   Musculoskeletal: She exhibits no tenderness.   Significantly decreased ROM of left shoulder  Muscular asymmetry of shoulders and upper back   Skin: Skin is warm and dry. No erythema.       Assessment and Plan:       ICD-10-CM    1. Rotator cuff syndrome, left M75.102 [facility] PT, HAND, AND CHIROPRACTIC REFERRAL   2. Chronic gout of multiple sites, unspecified cause M1A.09X0 allopurinol (ZYLOPRIM) 100 MG tablet       Suspect rotator cuff etiology. Will send to PT. Continue with tylenol prn   Call or rtc prn       CRYSTAL Mcclendon, CNP  [facility] [place]    "

## 2018-07-31 NOTE — MR AVS SNAPSHOT
After Visit Summary   7/31/2018    Tanisha Muhammad    MRN: [id number]           Patient Information     Date Of Birth          9/10/1926        Visit Information        Provider Department      7/31/2018 3:30 PM Sabi Oakley APRN CNP [facility] [place]        Today's Diagnoses     Rotator cuff syndrome, left    -  1    Chronic gout of multiple sites, unspecified cause          Care Instructions    Tylenol 1000 mg up to 3 times a day maximum               Follow-ups after your visit        Additional Services     [facility] PT, HAND, AND CHIROPRACTIC REFERRAL       **This order will print in the [facility] Scheduling Office**    Physical Therapy, Hand Therapy and Chiropractic Care are available through:    *[facility]  *[facility]  *[facility]    Call one number to schedule at any of the above locations: [phone].    Your provider has referred you to: Physical Therapy at [facility] or [facility]    Indication/Reason for Referral: Shoulder Pain  Onset of Illness: 1 day  Therapy Orders: Evaluate and Treat  Special Programs: None  Special Request: None    Deric Leos      Additional Comments for the Therapist or Chiropractor:     Please be aware that coverage of these services is subject to the terms and limitations of your health insurance plan.  Call member services at your health plan with any benefit or coverage questions.      Please bring the following to your appointment:    *Your personal calendar for scheduling future appointments  *Comfortable clothing                  Who to contact     If you have questions or need follow up information about today's clinic visit or your schedule please contact [facility] directly at [phone].  Normal or non-critical lab and imaging results will be communicated to you by MyChart, letter or phone within 4 business days after the clinic has received the results. If you do not hear from us within  "7 days, please contact the clinic through [employer] or phone. If you have a critical or abnormal lab result, we will notify you by phone as soon as possible.  Submit refill requests through [employer] or call your pharmacy and they will forward the refill request to us. Please allow 3 business days for your refill to be completed.          Additional Information About Your Visit        Care EveryWhere ID     This is your Care EveryWhere ID. This could be used by other organizations to access your [place] medical records  [id number]        Your Vitals Were     Pulse Temperature Height Pulse Oximetry Breastfeeding? BMI (Body Mass Index)    72 98  F (36.7  C) (Oral) 5' 3\" (1.6 m) 97% No 29.35 kg/m2       Blood Pressure from Last 3 Encounters:   07/31/18 136/74   07/26/18 112/70   10/13/17 138/86    Weight from Last 3 Encounters:   07/31/18 165 lb 11.2 oz (75.2 kg)   07/26/18 165 lb (74.8 kg)   09/06/17 161 lb (73 kg)              We Performed the Following     [facility] PT, HAND, AND CHIROPRACTIC REFERRAL          Today's Medication Changes          These changes are accurate as of 7/31/18  3:53 PM.  If you have any questions, ask your nurse or doctor.               These medicines have changed or have updated prescriptions.        Dose/Directions    allopurinol 100 MG tablet   Commonly known as:  ZYLOPRIM   This may have changed:    - how much to take  - how to take this  - when to take this  - additional instructions   Used for:  Chronic gout of multiple sites, unspecified cause   Changed by:  Sabi Oakley APRN CNP        Patient reports only taking this daily prn when she knows she is eating foods that might incur gout flare-ups   Refills:  0                Primary Care Provider Office Phone # Fax #    Sesar Cuevas [phone] [address] [facility] [address]  [facility] [id number]        Equal Access to Services     MARCO PEREZ AH: Hadii debby Singh, waaxda luqadadarlene, qaybta kaalmada " theron macevadim pamelaaubrey ballesteros ah. Angela [facility] [phone].    ATENCIÓN: Si habla derek, tiene a villanueva disposición servicios gratuitos de asistencia lingüística. Tavia al [phone].    We comply with applicable federal civil rights laws and [place] laws. We do not discriminate on the basis of race, color, national origin, age, disability, sex, sexual orientation, or gender identity.            Thank you!     Thank you for choosing [facility]  for your care. Our goal is always to provide you with excellent care. Hearing back from our patients is one way we can continue to improve our services. Please take a few minutes to complete the written survey that you may receive in the mail after your visit with us. Thank you!             Your Updated Medication List - Protect others around you: Learn how to safely use, store and throw away your medicines at www.disposemymeds.org.          This list is accurate as of 7/31/18  3:53 PM.  Always use your most recent med list.                   Brand Name Dispense Instructions for use Diagnosis    allopurinol 100 MG tablet    ZYLOPRIM     Patient reports only taking this daily prn when she knows she is eating foods that might incur gout flare-ups    Chronic gout of multiple sites, unspecified cause       losartan 50 MG tablet    COZAAR    90 tablet    TAKE 1 TABLET (50 MG) BY MOUTH DAILY    Essential hypertension, benign       VITAMIN B-12 PO      Take 1 tablet by mouth daily

## 2018-08-02 ENCOUNTER — THERAPY VISIT (OUTPATIENT)
Dept: PHYSICAL THERAPY | Facility: CLINIC | Age: 83
End: 2018-08-02
Payer: MEDICARE

## 2018-08-02 DIAGNOSIS — M54.2 CERVICAL PAIN: ICD-10-CM

## 2018-08-02 DIAGNOSIS — M25.512 LEFT SHOULDER PAIN: Primary | ICD-10-CM

## 2018-08-02 PROCEDURE — G8987 SELF CARE CURRENT STATUS: HCPCS | Mod: GP | Performed by: PHYSICAL THERAPIST

## 2018-08-02 PROCEDURE — G8988 SELF CARE GOAL STATUS: HCPCS | Mod: GP | Performed by: PHYSICAL THERAPIST

## 2018-08-02 PROCEDURE — 97140 MANUAL THERAPY 1/> REGIONS: CPT | Mod: GP | Performed by: PHYSICAL THERAPIST

## 2018-08-02 PROCEDURE — 97162 PT EVAL MOD COMPLEX 30 MIN: CPT | Mod: GP | Performed by: PHYSICAL THERAPIST

## 2018-08-02 NOTE — LETTER
DEPARTMENT OF HEALTH AND HUMAN SERVICES  CENTERS FOR MEDICARE & MEDICAID SERVICES    PLAN/UPDATED PLAN OF PROGRESS FOR OUTPATIENT REHABILITATION    PATIENTS NAME:  Tanisha Muhammad   : 9/10/1926  PROVIDER NUMBER:    [id number]  HICN: [id number]   PROVIDER NAME: [place] [facility]  MEDICAL RECORD NUMBER: [id number]   START OF CARE DATE:  SOC Date: 18   TYPE:  PT  PRIMARY/TREATMENT DIAGNOSIS: (Pertinent Medical Diagnosis)  Left shoulder pain  Cervical pain  VISITS FROM START OF CARE:  Rxs Used: 1     [place] [facility] Initial Evaluation  Subjective:  Patient is a 89 year old female presenting with rehab left shoulder hpi. The history is provided by the patient. No  was used.   Tanisha Muhammad is a 89 year old female with a left shoulder condition.  Condition occurred with:  Unknown cause.  Condition occurred: at home.  This is a new condition  18.    Site of Pain: in the joint and lateral left shoulder.  Radiates to:  Cervical.  Pain is described as aching (throbbing) and is constant and reported as 1/10.  Associated symptoms:  Loss of motion/stiffness. Pain is the same all the time.  Symptoms are exacerbated by certain positions, using arm at shoulder level, using arm overhead, using arm behind back and lifting and relieved by rest and heat.  Since onset symptoms are gradually improving.  Special testing: none.  Previous treatment: none.    General health as reported by patient is excellent.  Pertinent medical history includes:  High blood pressure.  Medical allergies: sulfa.  Other surgeries include:  None reported.  Current medications:  High blood pressure medication (Tylenol).  Current occupation is retired.      Barriers include:  Lives alone.  Red flags:  None as reported by the patient.    Cervical/Thoracic Evaluation  AROM:  AROM Cervical:  Flexion:            55 degrees  Extension:       40 degrees  Rotation:         Left:  40 degrees     Right: 50 degrees  Side Bend:      Left: 10 degrees     Right:  10 degrees    Cervical Myotomes:    C1-2 (Neck Flex): Left:  5    Right: 5  C3 (neck side bend): Left: 4    Right: 4  C4 (shrug):  Left: 5    Right: 4  C5 (Deltoid):  Left: 4    Right: 4  C6 (Biceps):  Left: 4    Right: 4  C7 (Triceps):  Left: 4    Right: 5  C8 (Thumb Ext): Left: 3    Right: 5  T1 (Intrinsics): Left: 3    Right: 5  Neural Tension:    Left side:  Radial; Ulnar and Median positive.  Right side:  Radial; Ulnar and Median  negative.    Shoulder Evaluation:  ROM:  AROM:    Flexion:  Left:  120    Right:  130  Extension: Left: 20Right: 20  Abduction:  Left: 120   Right:  130  Internal Rotation:  Left:  L2    Right:  T10  External Rotation:  Left:  C1    Right:  T3    Strength:  : Grossly 3/5 withing ROM. RC intact.  Palpation:  Palpation assessed shoulder: Extremely tight levator, upper trap and scalenes on the left compared to the right. Suboccipitals and infraspinatus extremely tight bilaterally.    Assessment/Plan:    Patient is a 89 year old female with cervical and left side shoulder complaints.    Patient has the following significant findings with corresponding treatment plan.                Diagnosis 1:  Left shoulder pain  Pain -  hot/cold therapy, manual therapy, self management, education and home program  Decreased ROM/flexibility - manual therapy, therapeutic exercise, therapeutic activity and home program  Decreased function - therapeutic activities and home program  Diagnosis 2:  Cervical pain   Pain -  hot/cold therapy, manual therapy, self management, education and home program  Decreased ROM/flexibility - manual therapy, therapeutic exercise, therapeutic activity and home program  Decreased function - therapeutic activities and home program  Impaired posture - neuro re-education, therapeutic activities and home program    Therapy Evaluation Codes:   1) History comprised of:   Personal factors that impact the  plan of care:      Age.    Comorbidity factors that impact the plan of care are:      High blood pressure.     Medications impacting care: High blood pressure.  2) Examination of Body Systems comprised of:   Body structures and functions that impact the plan of care:      Cervical spine and Shoulder.   Activity limitations that impact the plan of care are:      Bathing and Dressing.  3) Clinical presentation characteristics are:   Stable/Uncomplicated.  4) Decision-Making    Moderate complexity using standardized patient assessment instrument and/or measureable assessment of functional outcome.  Cumulative Therapy Evaluation is: Moderate complexity.      Previous and current functional limitations:  (See Goal Flow Sheet for this information)    Short term and Long term goals: (See Goal Flow Sheet for this information)     Communication ability:  Patient appears to be able to clearly communicate and understand verbal and written communication and follow directions correctly.  Treatment Explanation - The following has been discussed with the patient:   RX ordered/plan of care  Anticipated outcomes  Possible risks and side effects  This patient would benefit from PT intervention to resume normal activities.   Rehab potential is excellent.    Frequency:  1 X week, once daily  Duration:  for 8 weeks  Discharge Plan:  Achieve all LTG.  Independent in home treatment program.  Reach maximal therapeutic benefit.    Please refer to the daily flowsheet for treatment today, total treatment time and time spent performing 1:1 timed codes.     Caregiver Signature/Credentials _____________________________ Date ________       Treating Provider: Akila Jay PT, CSCS   I have reviewed and certified the need for these services and plan of treatment while under my care.      PHYSICIAN'S SIGNATURE:   _________________________________________  Date___________   Sabi Oakley MD    Certification period:  Beginning of Cert date  "period: 08/02/18 to  End of Cert period date: 10/30/18     Functional Level Progress Report: Please see attached \"Goal Flow sheet for Functional level.\"    ____X____ Continue Services or       ________ DC Services                Service dates: From  SOC Date: 08/02/18 date to present                         "

## 2018-08-02 NOTE — PROGRESS NOTES
[facility] Initial Evaluation  Subjective:  Patient is a 89 year old female presenting with rehab left shoulder hpi. The history is provided by the patient. No  was used.   Tanisha Muhammad is a 89 year old female with a left shoulder condition.  Condition occurred with:  Unknown cause.  Condition occurred: at home.  This is a new condition  7/29/18.    Site of Pain: in the joint and lateral left shoulder.  Radiates to:  Cervical.  Pain is described as aching (throbbing) and is constant and reported as 1/10.  Associated symptoms:  Loss of motion/stiffness. Pain is the same all the time.  Symptoms are exacerbated by certain positions, using arm at shoulder level, using arm overhead, using arm behind back and lifting and relieved by rest and heat.  Since onset symptoms are gradually improving.  Special testing: none.  Previous treatment: none.    General health as reported by patient is excellent.  Pertinent medical history includes:  High blood pressure.  Medical allergies: sulfa.  Other surgeries include:  None reported.  Current medications:  High blood pressure medication (Tylenol).  Current occupation is retired.        Barriers include:  Lives alone.    Red flags:  None as reported by the patient.                        Objective:  System              Cervical/Thoracic Evaluation    AROM:  AROM Cervical:    Flexion:            55 degrees  Extension:       40 degrees  Rotation:         Left: 40 degrees     Right: 50 degrees  Side Bend:      Left: 10 degrees     Right:  10 degrees        Cervical Myotomes:    C1-2 (Neck Flex): Left:  5    Right: 5  C3 (neck side bend): Left: 4    Right: 4  C4 (shrug):  Left: 5    Right: 4  C5 (Deltoid):  Left: 4    Right: 4  C6 (Biceps):  Left: 4    Right: 4  C7 (Triceps):  Left: 4    Right: 5  C8 (Thumb Ext): Left: 3    Right: 5  T1 (Intrinsics): Left: 3    Right: 5    Neural Tension:    Left side:  Radial; Ulnar and Median  positive.    Right side:  Radial; Ulnar and Median  negative.                   Shoulder Evaluation:  ROM:  AROM:    Flexion:  Left:  120    Right:  130  Extension: Left: 20Right: 20  Abduction:  Left: 120   Right:  130    Internal Rotation:  Left:  L2    Right:  T10  External Rotation:  Left:  C1    Right:  T3                      Strength:  : Grossly 3/5 withing ROM. RC intact.                          Palpation:  Palpation assessed shoulder: Extremely tight levator, upper trap and scalenes on the left compared to the right. Suboccipitals and infraspinatus extremely tight bilaterally.                                         General     ROS    Assessment/Plan:    Patient is a 89 year old female with cervical and left side shoulder complaints.    Patient has the following significant findings with corresponding treatment plan.                Diagnosis 1:  Left shoulder pain  Pain -  hot/cold therapy, manual therapy, self management, education and home program  Decreased ROM/flexibility - manual therapy, therapeutic exercise, therapeutic activity and home program  Decreased function - therapeutic activities and home program  Diagnosis 2:  Cervical pain   Pain -  hot/cold therapy, manual therapy, self management, education and home program  Decreased ROM/flexibility - manual therapy, therapeutic exercise, therapeutic activity and home program  Decreased function - therapeutic activities and home program  Impaired posture - neuro re-education, therapeutic activities and home program    Therapy Evaluation Codes:   1) History comprised of:   Personal factors that impact the plan of care:      Age.    Comorbidity factors that impact the plan of care are:      High blood pressure.     Medications impacting care: High blood pressure.  2) Examination of Body Systems comprised of:   Body structures and functions that impact the plan of care:      Cervical spine and Shoulder.   Activity limitations that impact the plan of care  are:      Bathing and Dressing.  3) Clinical presentation characteristics are:   Stable/Uncomplicated.  4) Decision-Making    Moderate complexity using standardized patient assessment instrument and/or measureable assessment of functional outcome.  Cumulative Therapy Evaluation is: Moderate complexity.    Previous and current functional limitations:  (See Goal Flow Sheet for this information)    Short term and Long term goals: (See Goal Flow Sheet for this information)     Communication ability:  Patient appears to be able to clearly communicate and understand verbal and written communication and follow directions correctly.  Treatment Explanation - The following has been discussed with the patient:   RX ordered/plan of care  Anticipated outcomes  Possible risks and side effects  This patient would benefit from PT intervention to resume normal activities.   Rehab potential is excellent.    Frequency:  1 X week, once daily  Duration:  for 8 weeks  Discharge Plan:  Achieve all LTG.  Independent in home treatment program.  Reach maximal therapeutic benefit.    Please refer to the daily flowsheet for treatment today, total treatment time and time spent performing 1:1 timed codes.

## 2018-08-15 ENCOUNTER — THERAPY VISIT (OUTPATIENT)
Dept: PHYSICAL THERAPY | Facility: CLINIC | Age: 83
End: 2018-08-15
Payer: MEDICARE

## 2018-08-15 DIAGNOSIS — M54.2 CERVICAL PAIN: ICD-10-CM

## 2018-08-15 DIAGNOSIS — M25.512 LEFT SHOULDER PAIN: ICD-10-CM

## 2018-08-15 PROCEDURE — 97140 MANUAL THERAPY 1/> REGIONS: CPT | Mod: GP | Performed by: PHYSICAL THERAPIST

## 2018-08-15 PROCEDURE — 97110 THERAPEUTIC EXERCISES: CPT | Mod: GP | Performed by: PHYSICAL THERAPIST

## 2018-08-21 ENCOUNTER — OFFICE VISIT (OUTPATIENT)
Dept: OBGYN | Facility: CLINIC | Age: 83
End: 2018-08-21
Payer: MEDICARE

## 2018-08-21 VITALS
HEIGHT: 63 IN | BODY MASS INDEX: 29.06 KG/M2 | WEIGHT: 164 LBS | HEART RATE: 80 BPM | SYSTOLIC BLOOD PRESSURE: 132 MMHG | DIASTOLIC BLOOD PRESSURE: 76 MMHG

## 2018-08-21 DIAGNOSIS — R30.0 DYSURIA: ICD-10-CM

## 2018-08-21 DIAGNOSIS — N81.10 CYSTOCELE WITH RECTOCELE: Primary | ICD-10-CM

## 2018-08-21 DIAGNOSIS — R35.0 FREQUENCY OF URINATION: ICD-10-CM

## 2018-08-21 DIAGNOSIS — N81.6 CYSTOCELE WITH RECTOCELE: Primary | ICD-10-CM

## 2018-08-21 LAB
ALBUMIN UR-MCNC: ABNORMAL MG/DL
APPEARANCE UR: ABNORMAL
BACTERIA #/AREA URNS HPF: ABNORMAL /HPF
BILIRUB UR QL STRIP: NEGATIVE
COLOR UR AUTO: YELLOW
GLUCOSE UR STRIP-MCNC: NEGATIVE MG/DL
HGB UR QL STRIP: ABNORMAL
KETONES UR STRIP-MCNC: NEGATIVE MG/DL
LEUKOCYTE ESTERASE UR QL STRIP: ABNORMAL
NITRATE UR QL: POSITIVE
PH UR STRIP: 6 PH (ref 5–7)
RBC #/AREA URNS AUTO: ABNORMAL /HPF
SOURCE: ABNORMAL
SP GR UR STRIP: 1.02 (ref 1–1.03)
UROBILINOGEN UR STRIP-ACNC: 0.2 EU/DL (ref 0.2–1)
WBC #/AREA URNS AUTO: ABNORMAL /HPF

## 2018-08-21 PROCEDURE — 87086 URINE CULTURE/COLONY COUNT: CPT | Performed by: OBSTETRICS & GYNECOLOGY

## 2018-08-21 PROCEDURE — 81001 URINALYSIS AUTO W/SCOPE: CPT | Performed by: OBSTETRICS & GYNECOLOGY

## 2018-08-21 PROCEDURE — 99213 OFFICE O/P EST LOW 20 MIN: CPT | Performed by: OBSTETRICS & GYNECOLOGY

## 2018-08-21 PROCEDURE — 87088 URINE BACTERIA CULTURE: CPT | Performed by: OBSTETRICS & GYNECOLOGY

## 2018-08-21 PROCEDURE — 87186 SC STD MICRODIL/AGAR DIL: CPT | Performed by: OBSTETRICS & GYNECOLOGY

## 2018-08-21 NOTE — PATIENT INSTRUCTIONS
Pessary replaced without issues.  Will return for removal/cleaning in 4-5 months per Dr. Pollard's previous recommendation.  Will start oral course of keflex for UTI and follow up with urine culture later this week.
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
## 2018-08-23 LAB
BACTERIA SPEC CULT: ABNORMAL
Lab: ABNORMAL
SPECIMEN SOURCE: ABNORMAL

## 2018-08-24 NOTE — PROGRESS NOTES
Please inform of results --she did indeed have a UTI and is on the appropriate antibiotic; should complete the course of 5d

## 2018-09-11 ENCOUNTER — TELEPHONE (OUTPATIENT)
Dept: OBGYN | Facility: CLINIC | Age: 83
End: 2018-09-11

## 2018-09-11 DIAGNOSIS — R39.15 URINARY URGENCY: Primary | ICD-10-CM

## 2018-09-11 RX ORDER — ESTRADIOL 0.1 MG/G
1 CREAM VAGINAL
Qty: 42.5 G | Refills: 3 | Status: SHIPPED | OUTPATIENT
Start: 2018-09-13 | End: 2019-07-25

## 2018-11-26 ENCOUNTER — TELEPHONE (OUTPATIENT)
Dept: FAMILY MEDICINE | Facility: CLINIC | Age: 83
End: 2018-11-26

## 2018-11-26 NOTE — TELEPHONE ENCOUNTER
Reason for Call:  Other vaccine question    Detailed comments: pt wants to know if she has had the shingles vaccine    Phone Number Patient can be reached at: Cell number on file:       Mobile    [phone]  Best Time: anytime    Can we leave a detailed message on this number? YES    Call taken on 11/26/2018 at 12:14 PM by Tanika Henderson

## 2019-03-06 DIAGNOSIS — M10.9 GOUT, UNSPECIFIED CAUSE, UNSPECIFIED CHRONICITY, UNSPECIFIED SITE: ICD-10-CM

## 2019-03-07 NOTE — TELEPHONE ENCOUNTER
Prednisone not on current med list.       Last rx'ed 05/2017  for 5 days with Dx of acute gouty arthritis     Last saw Dr. Cuevas 10/2017, Sabi 07/2018     No future appts     Routing to triage

## 2019-03-08 RX ORDER — PREDNISONE 20 MG/1
TABLET ORAL
Qty: 10 TABLET | Refills: 0 | OUTPATIENT
Start: 2019-03-08

## 2019-03-08 NOTE — TELEPHONE ENCOUNTER
Will need to call again. Home phone temporarily disconnected and cell phone voicemail full and can not accept new messages at this time.

## 2019-03-08 NOTE — TELEPHONE ENCOUNTER
Prednisone      Last Written Prescription Date:  8/4/17  Last Fill Quantity: 10,   # refills: 0  Last Office Visit: 7/31/18  Future Office visit:       Routing refill request to provider for review/approval because:  Drug not active on patient's medication list  Please review and authorize if appropriate.    Thank you,   Medardo MARCIAL RN

## 2019-03-11 RX ORDER — PREDNISONE 20 MG/1
40 TABLET ORAL DAILY
Qty: 10 TABLET | Refills: 0 | Status: SHIPPED | OUTPATIENT
Start: 2019-03-11 | End: 2021-02-11
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
## 2019-04-02 ENCOUNTER — OFFICE VISIT (OUTPATIENT)
Dept: OBGYN | Facility: CLINIC | Age: 84
End: 2019-04-02
Payer: MEDICARE

## 2019-04-02 VITALS — SYSTOLIC BLOOD PRESSURE: 118 MMHG | WEIGHT: 160.4 LBS | BODY MASS INDEX: 28.41 KG/M2 | DIASTOLIC BLOOD PRESSURE: 62 MMHG

## 2019-04-02 DIAGNOSIS — N81.10 CYSTOCELE WITH RECTOCELE: Primary | ICD-10-CM

## 2019-04-02 DIAGNOSIS — K59.00 CONSTIPATION, UNSPECIFIED CONSTIPATION TYPE: ICD-10-CM

## 2019-04-02 DIAGNOSIS — N81.6 CYSTOCELE WITH RECTOCELE: Primary | ICD-10-CM

## 2019-04-02 PROCEDURE — 99213 OFFICE O/P EST LOW 20 MIN: CPT | Performed by: OBSTETRICS & GYNECOLOGY

## 2019-04-05 ENCOUNTER — TELEPHONE (OUTPATIENT)
Dept: OBGYN | Facility: CLINIC | Age: 84
End: 2019-04-05

## 2019-04-05 DIAGNOSIS — R31.9 BLOOD IN URINE: Primary | ICD-10-CM

## 2019-04-05 DIAGNOSIS — R31.9 BLOOD IN URINE: ICD-10-CM

## 2019-04-05 DIAGNOSIS — N30.01 ACUTE CYSTITIS WITH HEMATURIA: ICD-10-CM

## 2019-04-05 LAB
ALBUMIN UR-MCNC: ABNORMAL MG/DL
APPEARANCE UR: ABNORMAL
BACTERIA #/AREA URNS HPF: ABNORMAL /HPF
BILIRUB UR QL STRIP: NEGATIVE
COLOR UR AUTO: YELLOW
GLUCOSE UR STRIP-MCNC: NEGATIVE MG/DL
HGB UR QL STRIP: ABNORMAL
KETONES UR STRIP-MCNC: NEGATIVE MG/DL
LEUKOCYTE ESTERASE UR QL STRIP: ABNORMAL
NITRATE UR QL: POSITIVE
PH UR STRIP: 5.5 PH (ref 5–7)
RBC #/AREA URNS AUTO: ABNORMAL /HPF
SOURCE: ABNORMAL
SP GR UR STRIP: 1.02 (ref 1–1.03)
UROBILINOGEN UR STRIP-ACNC: 0.2 EU/DL (ref 0.2–1)
WBC #/AREA URNS AUTO: ABNORMAL /HPF

## 2019-04-05 PROCEDURE — 87086 URINE CULTURE/COLONY COUNT: CPT | Performed by: OBSTETRICS & GYNECOLOGY

## 2019-04-05 PROCEDURE — 87186 SC STD MICRODIL/AGAR DIL: CPT | Performed by: OBSTETRICS & GYNECOLOGY

## 2019-04-05 PROCEDURE — 81001 URINALYSIS AUTO W/SCOPE: CPT | Performed by: OBSTETRICS & GYNECOLOGY

## 2019-04-05 PROCEDURE — 87088 URINE BACTERIA CULTURE: CPT | Performed by: OBSTETRICS & GYNECOLOGY

## 2019-04-05 RX ORDER — CIPROFLOXACIN 250 MG/1
250 TABLET, FILM COATED ORAL 2 TIMES DAILY
Qty: 14 TABLET | Refills: 0 | Status: SHIPPED | OUTPATIENT
Start: 2019-04-05 | End: 2019-06-12

## 2019-04-05 NOTE — TELEPHONE ENCOUNTER
4/2/19 Cystocele with rectocele. Pt has blood in her urine, would like to come and leave UA. Reviewed by Dr. Pollard. Ok to order UA. Pt informed. Transferred to scheduling to make appointment.

## 2019-04-06 LAB
BACTERIA SPEC CULT: ABNORMAL
Lab: ABNORMAL
SPECIMEN SOURCE: ABNORMAL

## 2019-04-11 DIAGNOSIS — N39.0 URINARY TRACT INFECTION WITHOUT HEMATURIA, SITE UNSPECIFIED: Primary | ICD-10-CM

## 2019-04-19 ENCOUNTER — TRANSFERRED RECORDS (OUTPATIENT)
Dept: HEALTH INFORMATION MANAGEMENT | Facility: CLINIC | Age: 84
End: 2019-04-19

## 2019-04-25 NOTE — TELEPHONE ENCOUNTER
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 More recently, noted increased freq of cold sore outbreaks during this past winter. Would start as burning and pain, and then lead to lip irritation and swelling. Swelling was out lips. No tongue involvement. No difficulty swelling. Lips would get red and irritated and weepy. Was seen 2x at [facility] in feb for this- felt swelling was due to irritation. Pt reports she has never has lip swelling or irritation unrealted to a preceeding cold sore. Not fcorrelated to ingestion of food or medications. No NSAID use preceeding. Tried benadryl with no change in sx. Reports was previously ried on a \"ppx medicine to use when cold sore starting\" by another doctor which she feels was helpful in past.    No hx of hives or swelling outside of lips. Uses carmax on lips for years. No other products. Has been working longer hours at work- but no change in duties or exposures. No travel. Normal cbc, cmp in feb labs.     No hx of asthma, food allergy or allergies per pt.      Past Medical History  Past Medical History:   Diagnosis Date   • H. pylori infection 2019       Past Surgical History:  Past Surgical History:   Procedure Laterality Date   • Appendectomy     • Ectopic pregnancy surgery     • Other surgical history      ectopic pregnancy      smoker?- no  Occupation: works in container [employer]      Family History:  --Asthma: no  --Allergic Rhinitis: no  --Atopic Dermatitis: no  --Immunodeficiency: no  --Angioedema: no  --Urticaria: no  --Food allergy: no  -- Autoimmune Disease: no  -- Drug allergy:no    Environmental Hx:  --Lives in a house.  -- Recently moved?no  --Carpeting in home - yes  --Foliage outside around home -  Called patient and gave her message from Dr. Destini Dominguez MA     avg  --Passive smoke exposure - no  --Air Conditioning use - yes  --Humidifier use - no  --Mold in the home - no  --Pets at home - dog    Allergies:   ALLERGIES:   Allergen Reactions   • Bactrim Ds Other (See Comments)     PURPURIC LESIONS   • Penicillins Other (See Comments)         No current outpatient medications on file.     No current facility-administered medications for this visit.          Review of Systems:  Gen:  No fevers, chills, night sweats, or significant weight change  Eye:  As above in HPI  ENT:  As above in HPI  Pulm:  As above in HPI  Cardiac:  No chest pain, palpitations  GI:  No diarrhea, constipation, abdominal pain, hematochezia, black stools.  MSK:  No joint pain or swelling, limitation of movement, or muscular weakness.  Neuro:  No fainting, seizures, paralysis, gait problems, or involuntary movement.  Psych:  No difficulty sleeping, anxiety, or depression.  Blood:  No anemia, easy bruising, easy bleeding  Lymph:  No swollen lymph nodes  Skin:  No rash, itching, hives, or eczema      OBJECTIVE  Vitals: /78   Pulse 74   Temp 98.3 °F (36.8 °C) (Temporal)   Ht 5' 1.5\" (1.562 m)   Wt 62.2 kg (137 lb 3.2 oz)   BMI 25.50 kg/m²   BSA 1.62 m²     Physical Exam:  Gen: No acute distress, speaking in full sentences  HEENT:  Oropharynx clear. no cobblestoning. No thrush.  Turbinates nl, no septal irritation, no drainage.  Sclera clear.  No sinus tenderness.  TMs gray/pearly, + light reflex b/l   Mouth: good dentition, moist mucous membranes  Neck:  Supple, trachea midline, no lymphadenopathy  Pulm: Clear to auscultation bilaterally. Normal respiratory effort.  No cough.   Car:  Normal rate and rhythm.  No murmurs, rubs, or gallops.   Abdomen:  No masses or tenderness noted. No hepatosplenomegaly  Extremeties:  No clubbing, cyanosis, or edema.  No obvious joint deformities noted.  Skin: No rash noted on face, arms, or hands.   Psych:  Normal mood and affect.    Skin Testing  -na      ASSESSMENT/PLAN:    All new problems to this provider requiring further work up and prescription medication management    Lip Angioedema:  Comment: Hx of recurrent lip swelling that occurs secondary to cold sore formation. Never occurs outside of preceding lip irritation/sore. No rash in between episodes. No other areas of involvement. No response to benadryl. Occurring since childhold. No associated with medication or food ingestion. Discussed by hx likely secondary reactive inflammation of lips to virus rather than true angioedema from allergy mechanism.   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 Could consider potentially patch testing- but lack of sx in between episodes makes contact allergy less likely  · Follow up only if needed        All medication side effects were discussed with the patient who verbalized understanding. If any concerns develop over tolerance of the medication they are to contact our office immediately or seek care if appropriate.      Thank you for allowing me to participate in the care of your patient. Should you have any questions or concerns, please do not hesitate to contact me.    Chyna Segovia MD, 4/25/2019 10:02 AM

## 2019-04-26 ENCOUNTER — TRANSFERRED RECORDS (OUTPATIENT)
Dept: HEALTH INFORMATION MANAGEMENT | Facility: CLINIC | Age: 84
End: 2019-04-26

## 2019-06-04 DIAGNOSIS — I10 ESSENTIAL HYPERTENSION, BENIGN: ICD-10-CM

## 2019-06-05 NOTE — TELEPHONE ENCOUNTER
"losartan (COZAAR) 50 MG tablet  Last Written Prescription Date:  10/24/16  Last Fill Quantity: 90,  # refills: 3   Last office visit: 7/31/2018 with prescribing provider:  Faith    Future Office Visit:      Requested Prescriptions   Pending Prescriptions Disp Refills     losartan (COZAAR) 50 MG tablet 90 tablet 3     Sig: Take 1 tablet (50 mg) by mouth daily       Angiotensin-II Receptors Failed - 6/4/2019  8:08 PM        Failed - Normal serum creatinine on file in past 12 months     Recent Labs   Lab Test 10/13/17  1720  04/21/14  0737   CR 1.18*   < >  --    CREAT  --   --  1.0    < > = values in this interval not displayed.             Failed - Normal serum potassium on file in past 12 months     Recent Labs   Lab Test 10/13/17  1720   POTASSIUM 3.9                    Passed - Blood pressure under 140/90 in past 12 months     BP Readings from Last 3 Encounters:   04/02/19 118/62   08/21/18 132/76   07/31/18 136/74                 Passed - Recent (12 mo) or future (30 days) visit within the authorizing provider's specialty     Patient had office visit in the last 12 months or has a visit in the next 30 days with authorizing provider or within the authorizing provider's specialty.  See \"Patient Info\" tab in inbasket, or \"Choose Columns\" in Meds & Orders section of the refill encounter.              Passed - Medication is active on med list        Passed - Patient is age 18 or older        Passed - No active pregnancy on record        Passed - No positive pregnancy test in past 12 months          "

## 2019-06-06 ENCOUNTER — HOSPITAL ENCOUNTER (EMERGENCY)
Facility: CLINIC | Age: 84
Discharge: HOME | End: 2019-06-06
Attending: EMERGENCY MEDICINE | Admitting: EMERGENCY MEDICINE
Payer: MEDICARE

## 2019-06-06 VITALS
DIASTOLIC BLOOD PRESSURE: 68 MMHG | HEIGHT: 62 IN | HEART RATE: 75 BPM | OXYGEN SATURATION: 99 % | TEMPERATURE: 97.7 F | BODY MASS INDEX: 28.16 KG/M2 | SYSTOLIC BLOOD PRESSURE: 182 MMHG | RESPIRATION RATE: 16 BRPM | WEIGHT: 153 LBS

## 2019-06-06 DIAGNOSIS — R03.0 ELEVATED BLOOD PRESSURE READING: ICD-10-CM

## 2019-06-06 PROCEDURE — 99282 EMERGENCY DEPT VISIT SF MDM: CPT

## 2019-06-06 RX ORDER — LOSARTAN POTASSIUM 50 MG/1
50 TABLET ORAL DAILY
Qty: 30 TABLET | Refills: 0 | Status: SHIPPED | OUTPATIENT
Start: 2019-06-06 | End: 2019-06-12

## 2019-06-06 ASSESSMENT — ENCOUNTER SYMPTOMS
SHORTNESS OF BREATH: 0
NAUSEA: 0
CONFUSION: 0
HEADACHES: 0
VOMITING: 0

## 2019-06-06 ASSESSMENT — MIFFLIN-ST. JEOR: SCORE: 1057.25

## 2019-06-06 NOTE — ED AVS SNAPSHOT
Emergency Department  [address] [facility] [address]  Phone:  [phone]  Fax:  [phone]                                    Tanisha Muhammad   MRN: [id number]    Department:   Emergency Department   Date of Visit:  6/6/2019           After Visit Summary Signature Page    I have received my discharge instructions, and my questions have been answered. I have discussed any challenges I see with this plan with the nurse or doctor.    ..........................................................................................................................................  Patient/Patient Representative Signature      ..........................................................................................................................................  Patient Representative Print Name and Relationship to Patient    ..................................................               ................................................  Date                                   Time    ..........................................................................................................................................  Reviewed by Signature/Title    ...................................................              ..............................................  Date                                               Time          22EPIC Rev 08/18

## 2019-06-06 NOTE — TELEPHONE ENCOUNTER
Routing refill request to provider for review/approval because:  A break in medication last prescribed 10/24/16 for one year.  Pended one month and note due for Px    Please approve if appropriate  Tanika Bangura RN

## 2019-06-07 NOTE — ED PROVIDER NOTES
History     Chief Complaint:  Elevated Blood Pressure     HPI   Tanisha Muhammad is a 89 year old female with a history of hypertension who presents for evaluation of an elevated blood pressure. The patient feels that she has been under increased stress recently due to concern for her daughter who recently fell and had multiple lower extremity fractures requiring placement in a long-term rehab facility, and the patient spent the day visiting her daughter today. When the patient checked her blood pressure at 1800 this evening she noticed that it was high at 214/99 mmHg, and when she rechecked it at 1830 it remained elevated at 190/90. Due to concern for these elevated blood pressure readings, the patient came into the ED for evaluation. Otherwise, the patient reports that she is currently feeling well and she denies any recent chest pain, shortness of breath, nausea, vomiting, headache, confusion, visual disturbance, or decreased urine output. She is only on Losartan for her blood pressure control and has not had any recent changes to her medications and has been taking her Losartan as prescribed.     Allergies:  Indomethacin  Keflex  Sulfamethoxazole-trimethoprim      Medications:    Cyanocobalamin (VITAMIN B-12 PO)  estradiol (ESTRACE VAGINAL) 0.1 MG/GM cream  losartan (COZAAR) 50 MG tablet    Past Medical History:    Gout  [place]'s disease  Inactive meniere's disease  Post-menopausal bleeding  Rectocele  Cystocele   Trigeminal neuralgia  Venous insufficiency  Hypertension     Past Surgical History:    Cataract iol, left  Laser yag capsulotomy, left     Family History:    Cancer, kidney - Mother and brother   Hypertension - Mother   Heart disease - Father and brother   Emphysema - Father   Fibromyalgia - Sister     Social History:  Tobacco use:    Never smoker   Alcohol use:    Positive   Marital status:       Accompanied to ED by:  Friend      Review of Systems   Eyes: Negative for visual disturbance.  "  Respiratory: Negative for shortness of breath.    Cardiovascular: Negative for chest pain.   Gastrointestinal: Negative for nausea and vomiting.   Genitourinary: Negative for decreased urine volume.   Neurological: Negative for headaches.   Psychiatric/Behavioral: Negative for confusion.   All other systems reviewed and are negative.      Physical Exam   First Vitals:  BP: (!) 230/77  Pulse: 77  Heart Rate: 83  Temp: 97.7  F (36.5  C)  Resp: 16  Height: 157.5 cm (5' 2\")  Weight: 69.4 kg (153 lb)  SpO2: 98 %      Physical Exam  Vitals: reviewed by me  General: Pt seen on [facility], cooperative, and alert to conversation  Eyes: Tracking well, clear conjunctiva BL  ENT: MMM, midline trachea.  Lungs:  No tachypnea, no accessory muscle use. No respiratory distress.   CV: Rate as above, regular rhythm.    Abd: Soft, non tender, no guarding, no rebound. Non distended  MSK: no peripheral edema or joint effusion.  No evidence of trauma  Skin: No rash, normal turgor and temperature  Neuro: Clear speech and no facial droop.  Psych: Not RIS, no e/o AH/VH      Emergency Department Course     Emergency Department Course:  Nursing notes and vitals reviewed.  2017: I performed an exam of the patient as documented above.     Findings and plan explained to the Patient. Patient discharged home with instructions regarding supportive care, medications, and reasons to return. The importance of close follow-up was reviewed.     Impression & Plan      Medical Decision Making:  Tanisha Muhammad is a very pleasant 89 year old female who presents to the emergency room with asymptomatic hypertension. On multiple assessments, she confirmed that she has no pain, no headache, no other medical complaints of any time, no exertional dyspnea, etc. She was prompted to come here by a friend who is a nurse and was appropriately alarmed at how high the blood pressure was. Thankfully, the blood pressure has come down here with simple " reassurance and time. She has had a very stressful day tending to the medical needs of her daughter, who is now in a long-term rehab center for lower extremity fractures. The patient herself notes that she has been under a lot of stress today. The patient lives four minutes away from the hospital, and she has a good friend at the bedside who states that she will be with her throughout the night. Should the patient develop any symptoms of any kind, specifically chest pain, confusion, or numbness, tingling, or weakness, she knows to come back immediately. No indication for treatment at this time as again she has asymptomatic hypertension that is resolving and also previously established. She can see her primary care doctor, Dr. Cuevas, either tomorrow or early next week for medication titration.     Diagnosis:    ICD-10-CM   1. Elevated blood pressure reading R03.0       Disposition:  Discharged to home.       I, Yuan Major, am serving as a scribe at 8:17 PM on 6/6/2019 to document services personally performed by Dr. Calzada, based on my observations and the provider's statements to me.     EMERGENCY DEPARTMENT       Anson Calzada MD  06/07/19 0016

## 2019-06-12 ENCOUNTER — OFFICE VISIT (OUTPATIENT)
Dept: FAMILY MEDICINE | Facility: CLINIC | Age: 84
End: 2019-06-12
Payer: MEDICARE

## 2019-06-12 VITALS
OXYGEN SATURATION: 99 % | SYSTOLIC BLOOD PRESSURE: 144 MMHG | DIASTOLIC BLOOD PRESSURE: 88 MMHG | HEIGHT: 62 IN | WEIGHT: 156.9 LBS | HEART RATE: 75 BPM | TEMPERATURE: 97.3 F | BODY MASS INDEX: 28.87 KG/M2

## 2019-06-12 DIAGNOSIS — M10.00 IDIOPATHIC GOUT, UNSPECIFIED CHRONICITY, UNSPECIFIED SITE: ICD-10-CM

## 2019-06-12 DIAGNOSIS — N18.30 CKD (CHRONIC KIDNEY DISEASE) STAGE 3, GFR 30-59 ML/MIN (H): ICD-10-CM

## 2019-06-12 DIAGNOSIS — Z13.220 LIPID SCREENING: ICD-10-CM

## 2019-06-12 DIAGNOSIS — Z63.79 STRESS DUE TO ILLNESS OF FAMILY MEMBER: Primary | ICD-10-CM

## 2019-06-12 DIAGNOSIS — I10 ESSENTIAL HYPERTENSION, BENIGN: ICD-10-CM

## 2019-06-12 PROCEDURE — 99214 OFFICE O/P EST MOD 30 MIN: CPT | Performed by: INTERNAL MEDICINE

## 2019-06-12 RX ORDER — AMLODIPINE BESYLATE 2.5 MG/1
2.5 TABLET ORAL DAILY
Qty: 90 TABLET | Refills: 3 | Status: SHIPPED | OUTPATIENT
Start: 2019-06-12 | End: 2020-06-09

## 2019-06-12 RX ORDER — LOSARTAN POTASSIUM 100 MG/1
100 TABLET ORAL DAILY
Qty: 90 TABLET | Refills: 3 | Status: SHIPPED | OUTPATIENT
Start: 2019-06-12 | End: 2020-07-02

## 2019-06-12 ASSESSMENT — MIFFLIN-ST. JEOR: SCORE: 1074.94
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
## 2019-06-24 ENCOUNTER — HOSPITAL ENCOUNTER (EMERGENCY)
Facility: CLINIC | Age: 84
Discharge: HOME | End: 2019-06-24
Attending: EMERGENCY MEDICINE | Admitting: EMERGENCY MEDICINE
Payer: MEDICARE

## 2019-06-24 VITALS
HEIGHT: 63 IN | OXYGEN SATURATION: 98 % | HEART RATE: 91 BPM | WEIGHT: 154 LBS | SYSTOLIC BLOOD PRESSURE: 158 MMHG | RESPIRATION RATE: 16 BRPM | BODY MASS INDEX: 27.29 KG/M2 | DIASTOLIC BLOOD PRESSURE: 67 MMHG | TEMPERATURE: 97.5 F

## 2019-06-24 DIAGNOSIS — F43.9 SITUATIONAL STRESS: ICD-10-CM

## 2019-06-24 DIAGNOSIS — I10 ESSENTIAL HYPERTENSION: ICD-10-CM

## 2019-06-24 PROCEDURE — 99282 EMERGENCY DEPT VISIT SF MDM: CPT

## 2019-06-24 ASSESSMENT — ENCOUNTER SYMPTOMS
NERVOUS/ANXIOUS: 1
HEADACHES: 1

## 2019-06-24 ASSESSMENT — MIFFLIN-ST. JEOR: SCORE: 1077.67

## 2019-06-24 NOTE — ED TRIAGE NOTES
Feeling BP is elevated. Pt's daughter is in the hospital and will be moved to hospice. Pt emotional in triage.

## 2019-06-24 NOTE — ED PROVIDER NOTES
History     Chief Complaint:  Hypertension      HPI   Tanisha Muhammad is a 89 year old female with a history of hypertension who presents to the emergency department for evaluation of hypertension. The patient's daughter is upstairs on floor 7 with two brain tumors, and is being moved to hospice care soon--they just found out about the tumors within the last week. Daughter is in 763. While visiting her daughter today, patient asked to have her blood pressure checked and she is not sure what happened to make it elevated. She had soup for lunch that may have had more salt in it than normal. Patient has pounding headaches when her blood pressure is high. She notes that she did not take her blood pressure medications at the correct time over the last few days because she has been at the hospital--she is on Losartan and Amlodipine. Additionally, she is under a lot of stress as her daughter is her only child. Her daughter is not able to communicate any longer because of her condition.     Allergies:  Indomethacin  Keflex [Cephalexin]  Sulfamethoxazole-Trimethoprim      Medications:    Amlodipine   Estradiol   Losartan  Prednisone      Past Medical History:    Cystocele   Elevated glucose   Gout   Brewster's disease   Inactive Meniere's disease   Post-menopausal bleeding   Rectocele   Trigeminal neuralgia  Hypertension   Venous peripheral insufficiency   Lumbago   Vitamin D deficiency   Vitamin B-complex deficiency   Cardiac arrhythmia     Past Surgical History:    Cataract IOL   Laser Yag Capsulotomy     Family History:    Mother - kidney cancer, hypertension   Father - heart disease, emphysema   Brother - myocardial infarction, renal cancer  Sister - fibromyalgia   Daughter - brain cancer     Social History:  The patient was alone.  Smoking Status: Never  Smokeless Tobacco: Never  Alcohol Use: Yes   Marital Status:        Review of Systems   Neurological: Positive for headaches.   Psychiatric/Behavioral: The  "patient is nervous/anxious.    All other systems reviewed and are negative.        Physical Exam     Patient Vitals for the past 24 hrs:   BP Temp Temp src Pulse Heart Rate Resp SpO2 Height Weight   06/24/19 1400 159/72 -- -- 91 -- -- -- -- --   06/24/19 1347 152/73 -- -- 92 -- -- -- -- --   06/24/19 1342 162/67 -- -- 94 -- -- -- -- --   06/24/19 1332 178/67 97.5  F (36.4  C) Temporal -- 98 16 98 % 1.6 m (5' 3\") 69.9 kg (154 lb)      Physical Exam  General: Patient is alert and tearful.  HEENT: Head atraumatic    Eyes: pupils equal and reactive. Conjunctiva clear   Nares: patent   Oropharynx: no lesions, uvula midline, no palatal draping, normal voice, no trismus  Neck: Supple without lymphadenopathy, no meningismus  Chest: Heart regular rate and rhythm.   Lungs: Equal clear to auscultation with no wheeze or rales  Abdomen: Soft, non tender, nondistended, normal bowel sounds  Back: No costovertebral angle tenderness, no midline C, T or L spine tenderness  Neuro: Grossly nonfocal, normal speech, strength equal bilaterally, CN 2-12 intact  Extremities: No deformities, equal radial and DP pulses. No clubbing, cyanosis.  No edema  Skin: Warm and dry with no rash.     Emergency Department Course     Emergency Department Course:  Nursing notes and vitals reviewed.    1345: I performed an exam of the patient as documented above.     1420: Patient rechecked and updated.      Findings and plan explained to the Patient. Patient discharged home with instructions regarding supportive care, medications, and reasons to return. The importance of close follow-up was reviewed.     Impression & Plan      Medical Decision Making:  Patient is a 89-year-old female who presents from medical floor upstairs where she was visiting her daughter.  Patient admits that she is been under significant emotional distress as her daughter was in the last week diagnosed with 2 brain tumors and now has been placed on hospice care where she will be " moving tomorrow.  Patient admits that her daughter is no longer responsive to her.  She does take a split dose of losartan and took her dose at noon today but she had gone home and eaten soup that she thinks likely had salt in it.  She had a headache following this but admitted to being emotionally upset as well.  Due to this she presents for blood pressure check.  Her blood pressure was initially noted elevated with systolic of 176/97.  With time and talking and relaxing in the emergency department her blood pressure did reduce to 158/67.  Patient's symptoms resolved.  Patient denied chest pain or ongoing headache.  Patient wished to be discharged from the emergency department without further extensive work-up as she wished to return to her daughter's bedside.  Is with reasonable clinical certainty that I feel this is a safe plan for the patient return precautions the emergency department were reviewed at length and all questions and concerns addressed.    Diagnosis:    ICD-10-CM   1. Essential hypertension I10   2. Situational stress F43.9       Disposition:  Discharged to home.     Scribe Disclosure:  I, Little Acharya, am serving as a scribe at 1:44 PM on 6/24/2019 to document services personally performed by Rika Tran MD based on my observations and the provider's statements to me.   6/24/2019    EMERGENCY DEPARTMENT       Rika Tran MD  06/24/19 6926

## 2019-06-24 NOTE — ED AVS SNAPSHOT
Emergency Department  [address] [facility] [address]  Phone:  [phone]  Fax:  [phone]                                    Tanisha Muhammad   MRN: [id number]    Department:   Emergency Department   Date of Visit:  6/24/2019           After Visit Summary Signature Page    I have received my discharge instructions, and my questions have been answered. I have discussed any challenges I see with this plan with the nurse or doctor.    ..........................................................................................................................................  Patient/Patient Representative Signature      ..........................................................................................................................................  Patient Representative Print Name and Relationship to Patient    ..................................................               ................................................  Date                                   Time    ..........................................................................................................................................  Reviewed by Signature/Title    ...................................................              ..............................................  Date                                               Time          22EPIC Rev 08/18

## 2019-06-25 ENCOUNTER — PATIENT OUTREACH (OUTPATIENT)
Dept: CARE COORDINATION | Facility: CLINIC | Age: 84
End: 2019-06-25

## 2019-06-25 NOTE — PROGRESS NOTES
Clinic Care Coordination Contact  [facility]/Voicemail    Referral Source: ED Follow-Up    Clinical Data: Care Coordinator Outreach to complete an ED follow. Pt was seen in the ED on 6/6/19 due to elevated blood pressure. Pt was seen for a follow up PCP appointment after this ED visit where Pt reported her daughter suffered a stroke and was in the hospital. According to hospital notes, Pt was visiting her daughter on floor 7 of the hospital when she went to the ED for an evaluation to check her blood pressure. Pt was seen in the ED 6/24/19 due to hypertension, situational stress due to daughters stoke and condition. Pt was discharged so she could be with her daughter.     Outreach attempted x 1.  Left message on voicemail with call back information and requested return call.    Plan: Care Coordinator will try to reach patient again in 1-2 business days.    Katie ABDALLA Care Coordination Team  [facility]

## 2019-06-26 NOTE — PROGRESS NOTES
Clinic Care Coordination Contact    Clinic Care Coordination Contact  OUTREACH    Referral Information:  Referral Source: ED Follow-Up    Primary Diagnosis: Psychosocial    Chief Complaint   Patient presents with     Clinic Care Coordination - Post Hospital     ED follow up        Universal Utilization: Pt was seen in the ED on 6/6/19 due to elevated blood pressure. Pt was seen for a follow up PCP appointment after this ED visit where Pt reported her daughter suffered a stroke and was in the hospital. According to hospital notes, Pt was visiting her daughter on floor 7 of the hospital when she went to the ED for an evaluation to check her blood pressure. Pt was seen in the ED 6/24/19 due to hypertension, situational stress due to daughters stoke and condition. Pt was discharged so she could be with her daughter.  Clinic Utilization  Difficulty keeping appointments: No  Compliance Concerns: No  No-Show Concerns: No  No PCP office visit in Past Year: No  Utilization    Last refreshed: 6/25/2019  4:57 PM:  Hospital Admissions 0           Last refreshed: 6/25/2019  4:57 PM:  ED Visits 2           Last refreshed: 6/25/2019  4:57 PM:  No Show Count (past year) 1              Current as of: 6/25/2019  4:57 PM            Clinical Concerns:  Current Medical Concerns:  Pt has been seen recently by PCP and in the hospital due to elevated blood pressure. Pt reported this elevated blood pressure is situational and due to daughter being terminally ill. Pt reported she would call the clinic if medical needs arise. Pt reported today she is doing fine and would call the clinic if needed.       Current Behavioral Concerns: None noted today.       Health Maintenance Reviewed: Not assessed    Medication Management:  No questions noted today regarding medications.      Functional Status:  Bed or wheelchair confined: No    Living Situation:  Current living arrangement: I live alone    Diet/Exercise/Sleep:  Inadequate nutrition (GOAL):  No  Food Insecurity: No  Tube Feeding: No    Transportation:  Transportation concerns (GOAL): No        Psychosocial:  Mental health DX: No  Mental health management concern (GOAL): No     Financial/Insurance:   Financial/Insurance concerns (GOAL): No       Resources and Interventions:  Current Resources:   Community Resources: Pt reported that her daughter is in hospice, therefore, additional services may be available for Pt if interested through hospice.      Referrals Placed: None      Patient/Caregiver understanding: Pt voiced her understanding to call the clinic if she has any additional needs.     Outreach Frequency: weekly  Future Appointments              In 6 days  [facility]     In 1 week Sesar Cuevas MD [facility] LuciaENZO          Plan: Pt reported that her daughter is terminally ill and on hospice. Pt agreed to call the [facility] directly if additional needs arise. Pt reported no care coordination needs today. No further follow up will be completed by the care coordination team at this time. Pt can be referred to care coordination in the future if needed.     Care Coordination Team  [facility]

## 2019-07-03 ENCOUNTER — TELEPHONE (OUTPATIENT)
Dept: FAMILY MEDICINE | Facility: CLINIC | Age: 84
End: 2019-07-03

## 2019-07-03 NOTE — TELEPHONE ENCOUNTER
Reason for Call:  FYI    Detailed comments: Patient called to cancel her appointment on Friday 7/5 due to her  Daughter passed away and she wanted me to let  know    Thank you      Call taken on 7/3/2019 at 12:34 PM by Rock Guerrero

## 2019-07-25 ENCOUNTER — OFFICE VISIT (OUTPATIENT)
Dept: OBGYN | Facility: CLINIC | Age: 84
End: 2019-07-25
Payer: MEDICARE

## 2019-07-25 VITALS — WEIGHT: 155 LBS | SYSTOLIC BLOOD PRESSURE: 128 MMHG | DIASTOLIC BLOOD PRESSURE: 80 MMHG | BODY MASS INDEX: 27.46 KG/M2

## 2019-07-25 DIAGNOSIS — N81.6 CYSTOCELE WITH RECTOCELE: Primary | ICD-10-CM

## 2019-07-25 DIAGNOSIS — R39.15 URINARY URGENCY: ICD-10-CM

## 2019-07-25 DIAGNOSIS — N81.10 CYSTOCELE WITH RECTOCELE: Primary | ICD-10-CM

## 2019-07-25 PROCEDURE — 99213 OFFICE O/P EST LOW 20 MIN: CPT | Performed by: OBSTETRICS & GYNECOLOGY

## 2019-07-25 RX ORDER — ESTRADIOL 0.1 MG/G
1 CREAM VAGINAL
Qty: 42.5 G | Refills: 3 | Status: SHIPPED | OUTPATIENT
Start: 2019-07-25
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
## 2019-08-03 ENCOUNTER — HOSPITAL ENCOUNTER (EMERGENCY)
Facility: CLINIC | Age: 84
Discharge: HOME | End: 2019-08-04
Attending: EMERGENCY MEDICINE | Admitting: EMERGENCY MEDICINE
Payer: MEDICARE

## 2019-08-03 VITALS
DIASTOLIC BLOOD PRESSURE: 79 MMHG | TEMPERATURE: 98.1 F | HEART RATE: 82 BPM | OXYGEN SATURATION: 96 % | SYSTOLIC BLOOD PRESSURE: 177 MMHG | RESPIRATION RATE: 18 BRPM

## 2019-08-03 DIAGNOSIS — I10 ESSENTIAL HYPERTENSION: Primary | ICD-10-CM

## 2019-08-03 LAB
ANION GAP SERPL CALCULATED.3IONS-SCNC: 7 MMOL/L (ref 3–14)
BASOPHILS # BLD AUTO: 0 10E9/L (ref 0–0.2)
BASOPHILS NFR BLD AUTO: 0.2 %
BUN SERPL-MCNC: 35 MG/DL (ref 7–30)
CALCIUM SERPL-MCNC: 9 MG/DL (ref 8.5–10.1)
CHLORIDE SERPL-SCNC: 111 MMOL/L (ref 94–109)
CO2 SERPL-SCNC: 22 MMOL/L (ref 20–32)
CREAT SERPL-MCNC: 1.22 MG/DL (ref 0.52–1.04)
DIFFERENTIAL METHOD BLD: ABNORMAL
EOSINOPHIL # BLD AUTO: 0 10E9/L (ref 0–0.7)
EOSINOPHIL NFR BLD AUTO: 0.6 %
ERYTHROCYTE [DISTWIDTH] IN BLOOD BY AUTOMATED COUNT: 13.4 % (ref 10–15)
GFR SERPL CREATININE-BSD FRML MDRD: 38 ML/MIN/{1.73_M2}
GLUCOSE SERPL-MCNC: 113 MG/DL (ref 70–99)
HCT VFR BLD AUTO: 33.5 % (ref 35–47)
HGB BLD-MCNC: 11.6 G/DL (ref 11.7–15.7)
IMM GRANULOCYTES # BLD: 0 10E9/L (ref 0–0.4)
IMM GRANULOCYTES NFR BLD: 0.6 %
INTERPRETATION ECG - MUSE: NORMAL
LYMPHOCYTES # BLD AUTO: 1.6 10E9/L (ref 0.8–5.3)
LYMPHOCYTES NFR BLD AUTO: 31.4 %
MCH RBC QN AUTO: 29.5 PG (ref 26.5–33)
MCHC RBC AUTO-ENTMCNC: 34.6 G/DL (ref 31.5–36.5)
MCV RBC AUTO: 85 FL (ref 78–100)
MONOCYTES # BLD AUTO: 0.7 10E9/L (ref 0–1.3)
MONOCYTES NFR BLD AUTO: 13.6 %
NEUTROPHILS # BLD AUTO: 2.7 10E9/L (ref 1.6–8.3)
NEUTROPHILS NFR BLD AUTO: 53.6 %
NRBC # BLD AUTO: 0 10*3/UL
NRBC BLD AUTO-RTO: 0 /100
PLATELET # BLD AUTO: 98 10E9/L (ref 150–450)
POTASSIUM SERPL-SCNC: 4 MMOL/L (ref 3.4–5.3)
RBC # BLD AUTO: 3.93 10E12/L (ref 3.8–5.2)
SODIUM SERPL-SCNC: 140 MMOL/L (ref 133–144)
TROPONIN I SERPL-MCNC: <0.015 UG/L (ref 0–0.04)
TSH SERPL DL<=0.005 MIU/L-ACNC: 3.09 MU/L (ref 0.4–4)
WBC # BLD AUTO: 5.1 10E9/L (ref 4–11)

## 2019-08-03 PROCEDURE — 80048 BASIC METABOLIC PNL TOTAL CA: CPT | Performed by: EMERGENCY MEDICINE

## 2019-08-03 PROCEDURE — 85025 COMPLETE CBC W/AUTO DIFF WBC: CPT | Performed by: EMERGENCY MEDICINE

## 2019-08-03 PROCEDURE — 84443 ASSAY THYROID STIM HORMONE: CPT | Performed by: EMERGENCY MEDICINE

## 2019-08-03 PROCEDURE — 84484 ASSAY OF TROPONIN QUANT: CPT | Performed by: EMERGENCY MEDICINE

## 2019-08-03 PROCEDURE — 99284 EMERGENCY DEPT VISIT MOD MDM: CPT

## 2019-08-03 PROCEDURE — 93005 ELECTROCARDIOGRAM TRACING: CPT

## 2019-08-03 ASSESSMENT — ENCOUNTER SYMPTOMS
SHORTNESS OF BREATH: 0
ABDOMINAL PAIN: 0
FEVER: 0

## 2019-08-03 NOTE — ED AVS SNAPSHOT
Emergency Department  [address] [place] [address]  Phone:  [phone]  Fax:  [phone]                                    Tanisha Muhammad   MRN: [id number]    Department:   Emergency Department   Date of Visit:  8/3/2019           After Visit Summary Signature Page    I have received my discharge instructions, and my questions have been answered. I have discussed any challenges I see with this plan with the nurse or doctor.    ..........................................................................................................................................  Patient/Patient Representative Signature      ..........................................................................................................................................  Patient Representative Print Name and Relationship to Patient    ..................................................               ................................................  Date                                   Time    ..........................................................................................................................................  Reviewed by Signature/Title    ...................................................              ..............................................  Date                                               Time          22EPIC Rev 08/18

## 2019-08-04 NOTE — ED TRIAGE NOTES
Pt here requesting blood pressure check. She reports hypertension at home, but does not remember what it was. No symptoms - no headache, CP, or SOB.

## 2019-08-04 NOTE — ED NOTES
Report received. Patient visualized. Vital signs stable. Patients blood pressure is still elevated. Patient resting comfortably in bed. Denies needs at this time.

## 2019-08-04 NOTE — ED PROVIDER NOTES
"  History     Chief Complaint:  Hypertension    HPI   Tanisha Muhammad is a 89 year old female, with a history of hypertension, who presents with concerns of hypertension. Of note, the patient states that she has diagnosed situational hypertension and her daughter had passed 2 weeks ago. She states that she has been under increased stress recently. Tonight, the patient states that she felt \"thumping\" in her ears that is consistent with when she is hypertensive, prompting her ED visit. Here, the patient denies any chest pain, shortness of breath, abdominal pain, fevers, or changes in her baseline.     Allergies:  Indomethacin  Keflex [Cephalexin]  Sulfamethoxazole-Trimethoprim      Medications:    Amlodipine  Estradiol  Losartan    Past Medical History:    Cystocele  Gout  [place]'s disease  Inactive  Meniere's disease  Rectocele  Trigeminal neuralgia   Hypertension  Peripheral venous insufficiency     Past Surgical History:    Cataract bilateral  Laser yag capsulotomy    Family History:    Kidney cancer  Hypertension  Heart disease  Emphysema  MI  Fibromyalgia   Stent placement x4    Social History:  Presents with her neighbor   Positive for alcohol use.   Negative for tobacco use.   Marital Status:   [5]     Review of Systems   Constitutional: Negative for fever.   Respiratory: Negative for shortness of breath.    Cardiovascular: Negative for chest pain.   Gastrointestinal: Negative for abdominal pain.   All other systems reviewed and are negative.      Physical Exam     Patient Vitals for the past 24 hrs:   BP Temp Temp src Pulse Heart Rate Resp SpO2   08/03/19 2300 (!) 177/79 -- -- -- 76 18 96 %   08/03/19 2239 -- 98.1  F (36.7  C) Oral -- -- -- --   08/03/19 2238 -- -- -- -- -- -- 97 %   08/03/19 2237 (!) 204/92 -- -- 82 -- -- 98 %        Physical Exam  General: Alert, appears elderly, well-developed and well-nourished. Cooperative.     In mild distress, tearful, sad about daughter's recent death "   HEENT:  Head:  Atraumatic  Ears:  External ears are normal  Mouth/Throat:  Oropharynx is without erythema or exudate and mucous membranes are moist   Eyes:   Conjunctivae normal and EOM are normal. No scleral icterus.    Pupils are equal, round, and reactive to light.   CV:  Normal rate, regular rhythm, normal heart sounds and radial pulses are 2+ and symmetric.  No murmur.  Resp:  Breath sounds are clear bilaterally    Non-labored, no retractions or accessory muscle use  GI:  Abdomen is soft, no distension, no tenderness. No rebound or guarding.  No CVA tenderness bilaterally  MS:  Normal range of motion. No edema.    Normal strength in all 4 extremities.     Back atraumatic.    No midline cervical, thoracic, or lumbar tenderness  Skin:  Warm and dry.  No rash or lesions noted.  Neuro:   Alert. Normal strength.  Sensation intact in all 4 extremities. GCS: 15    Cranial nerves 2-12 intact, chronic left sided hearing loss   Psych:  Depressed mood, flat affect, appropriate mood for recent daughter's passing.       Emergency Department Course   ECG:  Indication: hypertension concern  Time: 2301  Vent. Rate 77 bpm. KS interval 240. QRS duration 84. QT/QTc 374/423. P-R-T axis 48 -11 52. Sinus rhythm with 1st degree AV block. Moderate voltage criteria for LVH, may be normal variant. Possible lateral infarct, age undetermined. Abnormal ECG. No significant changes to EKG dated 8/9/16. Read time: 1105.      Laboratory:  CBC: WBC: 5.1, HGB: 11.6 (L), PLT: 98 (L)  BMP: Glucose 113 (H), Chloride: 111 (H), Urea Nitrogen: 35 (H), Creatinine: 1.22 (H), GFR: 38 (L), o/w WNL      Troponin: <0.015  TSH with free T4 reflex: 3.09    Emergency Department Course:  Nursing notes and vitals reviewed. EKG was obtained, findings above.      4924  I performed an exam of the patient as documented above.     Blood drawn. This was sent to the lab for further testing, results above.    1961 I rechecked the patient and discussed the results of  her workup thus far.     Findings and plan explained to the Patient. Patient discharged home with instructions regarding supportive care, medications, and reasons to return. The importance of close follow-up was reviewed.     I personally reviewed the laboratory results with the Patient and answered all related questions prior to discharge.     Impression & Plan      Medical Decision Making:  Tanisha Muhammad is a 89 year old female who presents for evaluation of elevated blood pressure.  There is a history of hypertension in the past.  The workup here is negative and the patient does not have any clinical, laboratory, EKG or historical signs of end-organ dysfunction.  There is no signs of hypertensive emergency or urgency.  Supportive outpatient management is therefore indicated with close follow-up of primary care physician. Given data obtained here in ED, will not initiate therapy at this time and encouraged serial blood pressure monitoring at home to aid primary in decision making regarding hypertension.      Diagnosis:    ICD-10-CM   1. Essential hypertension I10       Disposition:  discharged to home    ICassandra, am serving as a scribe on 8/3/2019 at 10:59 PM to personally document services performed by Kaz Taylor MD based on my observations and the provider's statements to me.      Cassandra Patel  8/3/2019    EMERGENCY DEPARTMENT       Kaz Taylor MD  08/04/19 3291

## 2019-08-05 ENCOUNTER — PATIENT OUTREACH (OUTPATIENT)
Dept: CARE COORDINATION | Facility: CLINIC | Age: 84
End: 2019-08-05

## 2019-08-05 DIAGNOSIS — I10 ESSENTIAL HYPERTENSION: Primary | ICD-10-CM

## 2019-08-05 ASSESSMENT — ACTIVITIES OF DAILY LIVING (ADL): DEPENDENT_IADLS:: INDEPENDENT

## 2019-08-05 NOTE — PROGRESS NOTES
Clinic Care Coordination Contact    Clinic Care Coordination Contact  OUTREACH    Referral Information:  Referral Source: ED Follow-Up    Primary Diagnosis: Other (include Comment box)(Hypertension)    Chief Complaint   Patient presents with     Clinic Care Coordination - Post Hospital     Clinic Care Coordination RN        Universal Utilization: 8/3/2019-ED visit Hypertension   Clinic Utilization  Difficulty keeping appointments:: No  Compliance Concerns: No  No-Show Concerns: No  No PCP office visit in Past Year: No  Utilization    Last refreshed: 8/5/2019  8:34 AM:  Hospital Admissions 0           Last refreshed: 8/5/2019  8:34 AM:  ED Visits 3           Last refreshed: 8/5/2019  8:34 AM:  No Show Count (past year) 2              Current as of: 8/5/2019  8:34 AM              Clinical Concerns:  Current Medical Concerns:  Patient reports her high blood pressure is stressed related  Patients 67 year old daughter passed away June 29  Patient is checking her blood pressure daily and last night the reading was 144/87.   Patient refuses a support group at this time stating she has the information when she is ready    Current Behavioral Concerns: Not discussed       Pain  Pain (GOAL):: No  Health Maintenance Reviewed: Due/Overdue   Health Maintenance Due   Topic Date Due     MEDICARE ANNUAL WELLNESS VISIT  09/10/1991     ZOSTER IMMUNIZATION (2 of 3) 08/20/2008     PNEUMOCOCCAL IMMUNIZATION 65+ LOW/MEDIUM RISK (2 of 2 - PPSV23) 06/04/2016     DTAP/TDAP/TD IMMUNIZATION (2 - Td) 05/01/2017     ADVANCE CARE PLANNING  05/02/2017     FALL RISK ASSESSMENT  08/04/2018     PHQ-2  01/01/2019     Clinical Pathway: None    Medication Management:  Reviewed      Functional Status:  Dependent ADLs:: Independent  Dependent IADLs:: Independent  Bed or wheelchair confined:: No  Mobility Status: Independent    Living Situation:  Current living arrangement:: I live alone  Type of residence:: Private home - no  stairs    Diet/Exercise/Sleep:  Inadequate nutrition (GOAL):: No  Food Insecurity: No  Tube Feeding: No  Inadequate activity/exercise (GOAL):: No  Significant changes in sleep pattern (GOAL): No    Transportation:  Transportation concerns (GOAL):: No  Transportation means:: Accessible car     Psychosocial:  Christianity or spiritual beliefs that impact treatment:: (NA)  Mental health DX:: No  Mental health management concern (GOAL):: No  Informal Support system:: Friends, Neighbors     Financial/Insurance:   Financial/Insurance concerns (GOAL):: No  Community Resources: None  Supplies used at home:: None  Equipment Currently Used at Home: none    Advance Care Plan/Directive  Advanced Care Plans/Directives on file:: No  Type Advanced Care Plans/Directives: (NA)  Advanced Care Plan/Directive Status: Not Applicable    Referrals Placed: None     Goals:   Goals        Blood Pressure    Blood Pressure below 140/90     Notes - Note created  8/5/2019 11:18 AM by Palmira Jones RN    Goal Statement: I will check my blood pressure daily   Measure of Success:Blood pressure within the normal range  Supportive Steps to Achieve: I will seek medical help if blood pressure consistently over 140/90  I will take my blood pressure medications as directed   I will keep provider future appointment   I will follow a no added salt diet the best I can   Barriers: None  Strengths: Patient is already checking blood pressures daily  Date to Achieve By: 9/4/2019  Patient expressed understanding of goal: Yes                Patient/Caregiver understanding: Patient expresses good understanding of discharge instructions     Outreach Frequency: weekly  Future Appointments              [facility] 2 days [facility]    In 2 days UP NURSE [facility] Nurse, [facility]          Plan:   Patient will continue to check her blood pressure daily and call if consistently over 140/90  CC mailed introductory letter and care plan  Transferred patient to  appointment line to make a hospital follow up appointment   CC will follow up in 3-5 business days     Palmira Jones RN, Care Coordinator   [facility] -Care Coordination  [facility] , [facility] and [facility]   [phone]

## 2019-08-05 NOTE — LETTER
[facility] CARE COORDINATION  [address]  [facility] [address]    August 5, 2019    Tanisha Muhammad  [address]   [facility] [address]      Dear Tanisha,    I am a clinic care coordinator who works with Sesar Cuevas MD at [facility] . I wanted to thank you for spending the time to talk with me.  I wanted to introduce myself and provide you with my contact information so that you can call me with questions or concerns about your health care. Below is a description of clinic care coordination and how I can further assist you.     The clinic care coordinator is a registered nurse and/or  who understand the health care system. The goal of clinic care coordination is to help you manage your health and improve access to the [facility] system in the most efficient manner. The registered nurse can assist you in meeting your health care goals by providing education, coordinating services, and strengthening the communication among your providers. The  can assist you with financial, behavioral, psychosocial, chemical dependency, counseling, and/or psychiatric resources.    Please feel free to contact me at [phone], with any questions or concerns. We at [facility] are focused on providing you with the highest-quality healthcare experience possible and that all starts with you.     Sincerely,     Palmira Jones RN, Care Coordinator   [facility] -Care Coordination  [facility] , [facility] [place] and [facility]   [phone]     Enclosed: I have enclosed a copy of the Complex Care Plan. This has helpful information and goals that we have talked about. Please keep this in an easy to access place to use as needed.
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
## 2019-08-07 ENCOUNTER — ALLIED HEALTH/NURSE VISIT (OUTPATIENT)
Dept: NURSING | Facility: CLINIC | Age: 84
End: 2019-08-07
Payer: MEDICARE

## 2019-08-07 VITALS
WEIGHT: 157 LBS | HEIGHT: 63 IN | OXYGEN SATURATION: 97 % | SYSTOLIC BLOOD PRESSURE: 137 MMHG | BODY MASS INDEX: 27.82 KG/M2 | TEMPERATURE: 98.1 F | HEART RATE: 69 BPM | DIASTOLIC BLOOD PRESSURE: 76 MMHG

## 2019-08-07 DIAGNOSIS — I10 HTN (HYPERTENSION): Primary | ICD-10-CM

## 2019-08-07 PROCEDURE — 99207 ZZC NO CHARGE NURSE ONLY: CPT

## 2019-08-07 ASSESSMENT — MIFFLIN-ST. JEOR: SCORE: 1091.28

## 2019-08-07 NOTE — PROGRESS NOTES
"Tanisha Muhammad is a 89 year old patient who comes in today for a Blood Pressure check.  Initial BP:  /76 (BP Location: Left arm, Cuff Size: Adult Large)   Pulse 69   Temp 98.1  F (36.7  C) (Oral)   Ht 1.6 m (5' 3\")   Wt 71.2 kg (157 lb)   SpO2 97%   BMI 27.81 kg/m       69    Disposition: results routed to provider    Nita Hunter MA    "

## 2019-08-22 ENCOUNTER — PATIENT OUTREACH (OUTPATIENT)
Dept: CARE COORDINATION | Facility: CLINIC | Age: 84
End: 2019-08-22

## 2019-08-22 DIAGNOSIS — I10 ESSENTIAL HYPERTENSION, BENIGN: Primary | ICD-10-CM

## 2019-08-22 ASSESSMENT — ACTIVITIES OF DAILY LIVING (ADL): DEPENDENT_IADLS:: INDEPENDENT

## 2019-08-22 NOTE — PROGRESS NOTES
Clinic Care Coordination Contact    Follow Up Progress Note     8/3/2019-ED visit Hypertension      Assessment: Patient reports her blood pressure is going down and th e average reading lately has been 144/86    Goals addressed this encounter:   Goals Addressed                 This Visit's Progress      Blood Pressure < 140/90        Goal Statement: I will check my blood pressure daily   Measure of Success:Blood pressure within the normal range  Supportive Steps to Achieve: I will seek medical help if blood pressure consistently over 140/90  I will take my blood pressure medications as directed   I will keep provider future appointment   I will follow a no added salt diet the best I can   Barriers: None  Strengths: Patient is already checking blood pressures daily  Date to Achieve By: 9/22/2019  Patient expressed understanding of goal: Yes               Intervention/Education provided during outreach: Patient agrees to call the clinic if her blood pressure is consistently over 140/90   CC encouraged patient to make a follow up appointment with PCP  Outreach Frequency: weekly    Plan:   Patient is on her way out the door but agrees to make a follow up appointment soon with PCP  Patient will continue daily blood pressure checks and call with any high readings of concern   CC will follow up in 1-2 weeks   Palmira Jones RN, Care Coordinator   [facility] -Care Coordination  [facility] , [facility] and [facility]   [phone]

## 2019-09-11 ENCOUNTER — PATIENT OUTREACH (OUTPATIENT)
Dept: CARE COORDINATION | Facility: CLINIC | Age: 84
End: 2019-09-11

## 2019-09-11 DIAGNOSIS — I10 ESSENTIAL HYPERTENSION, BENIGN: Primary | ICD-10-CM

## 2019-09-11 ASSESSMENT — ACTIVITIES OF DAILY LIVING (ADL): DEPENDENT_IADLS:: INDEPENDENT

## 2019-09-11 NOTE — PROGRESS NOTES
Clinic Care Coordination Contact    Clinic Care Coordination Contact  OUTREACH    Referral Information:  Referral Source: ED Follow-Up    Primary Diagnosis: Other (include Comment box)(Hypertension)    Chief Complaint   Patient presents with     Clinic Care Coordination - Follow-up     Clinic Care Coordination RN         Universal Utilization:  8/3/2019-ED visit Hypertension   Clinic Utilization  Difficulty keeping appointments:: No  Compliance Concerns: No  No-Show Concerns: No  No PCP office visit in Past Year: No  Utilization    Last refreshed: 2019  3:34 PM:  Hospital Admissions 0           Last refreshed: 2019  3:34 PM:  ED Visits 3           Last refreshed: 2019  3:34 PM:  No Show Count (past year) 2              Current as of: 2019  3:34 PM              Clinical Concerns:  Current Medical Concerns:  Patient reports she still has some higher blood pressure readings 167/80 yesterday  Patient states she just lost her daughter and the  was   ED provider informed her this might be situational hypertension  Patient plans to make a nurse only appointment next week to check her blood pressure   Pain  Pain (GOAL):: No  Health Maintenance Reviewed:    Clinical Pathway: None    Medication Management:  Not discussed today      Functional Status:  Dependent ADLs:: Independent  Dependent IADLs:: Independent  Bed or wheelchair confined:: No  Mobility Status: Independent    Living Situation:  Current living arrangement:: I live alone  Type of residence:: Private home - no stairs    Diet/Exercise/Sleep:  Inadequate nutrition (GOAL):: No  Food Insecurity: No  Tube Feeding: No  Inadequate activity/exercise (GOAL):: No  Significant changes in sleep pattern (GOAL): No    Transportation:  Transportation concerns (GOAL):: No  Transportation means:: Accessible car     Psychosocial:  Oriental orthodox or spiritual beliefs that impact treatment:: (NA)  Mental health DX:: No  Mental health management concern (GOAL)::  No  Informal Support system:: Friends, Neighbors     Financial/Insurance:   Financial/Insurance concerns (GOAL):: No    Community Resources: None  Supplies used at home:: None  Equipment Currently Used at Home: none    Advance Care Plan/Directive  Advanced Care Plans/Directives on file:: No  Type Advanced Care Plans/Directives: (NA)  Advanced Care Plan/Directive Status: Not Applicable    Referrals Placed: None     Goals:   Goals        Blood Pressure    Blood Pressure below 140/90     Notes - Note edited  9/11/2019  3:23 PM by Palmira Jones RN    Goal Statement: I will check my blood pressure daily   Measure of Success:Blood pressure within the normal range  Supportive Steps to Achieve: I will seek medical help if blood pressure consistently over 140/90  I will take my blood pressure medications as directed   I will keep provider future appointment   I will follow a no added salt diet the best I can   Barriers: None  Strengths: Patient is already checking blood pressures daily  Date to Achieve By: 10/11/2019  Patient expressed understanding of goal: Yes                Outreach Frequency: 2 weeks      Plan:   Patient will check her blood pressure drake and call if readings consistently above 140/90  Patient will make a blood pressure follow up next week   CC will follow up in 1-2 weeks     Palmira Jones RN, Care Coordinator   [facility] -Care Coordination  [facility] and [facility]   [phone]     Palmira Jones RN, Care Coordinator   [facility] -Care Coordination  [facility] and [facility]   [phone]

## 2019-10-02 ENCOUNTER — PATIENT OUTREACH (OUTPATIENT)
Dept: CARE COORDINATION | Facility: CLINIC | Age: 84
End: 2019-10-02

## 2019-10-02 DIAGNOSIS — I10 ESSENTIAL HYPERTENSION, BENIGN: Primary | ICD-10-CM

## 2019-10-02 ASSESSMENT — ACTIVITIES OF DAILY LIVING (ADL): DEPENDENT_IADLS:: INDEPENDENT

## 2019-10-02 NOTE — PROGRESS NOTES
Clinic Care Coordination Contact    Follow Up Progress Note      Assessment: Patient states her blood pressure reading have been looking goo  Patient was not able to get the past list of readings.  Patient was strongly encouraged to make a blood pressure follow up appointment soon and to bring in blood pressure readings that day  Patient agrees with the plan   Patient is going to [place] the first part of November and needs a flu vaccine at her next visit      Goals addressed this encounter:   Goals Addressed                 This Visit's Progress      Blood Pressure < 140/90        Goal Statement: I will check my blood pressure daily   Measure of Success:Blood pressure within the normal range  Supportive Steps to Achieve: I will seek medical help if blood pressure consistently over 140/90  I will take my blood pressure medications as directed   I will keep provider future appointment   I will follow a no added salt diet the best I can   Barriers: None  Strengths: Patient is already checking blood pressures daily  Date to Achieve By:11/1/2019  Patient expressed understanding of goal: Yes               Intervention/Education provided during outreach: Patient agrees to continue daily blood pressure readings and will call and make a PCP follow up today     Outreach Frequency: 2 weeks    Plan:   CC will meet face to face at next PCP visit     Palmira Jones RN, Care Coordinator   [facility] -Care Coordination  [facility] , [facility] and [facility]   [phone]

## 2019-10-11 ENCOUNTER — PATIENT OUTREACH (OUTPATIENT)
Dept: CARE COORDINATION | Facility: CLINIC | Age: 84
End: 2019-10-11

## 2019-10-11 DIAGNOSIS — I10 ESSENTIAL HYPERTENSION, BENIGN: Primary | ICD-10-CM

## 2019-10-11 ASSESSMENT — ACTIVITIES OF DAILY LIVING (ADL): DEPENDENT_IADLS:: INDEPENDENT

## 2019-10-11 NOTE — PROGRESS NOTES
Clinic Care Coordination Contact    Follow Up Progress Note      Assessment: Patient called and states she will be leaving for [place] for 5 months and no further needs at this time     Goals addressed this encounter:   Goals Addressed                 This Visit's Progress      COMPLETED: Blood Pressure < 140/90        Goal Statement: I will check my blood pressure daily   Measure of Success:Blood pressure within the normal range  Supportive Steps to Achieve: I will seek medical help if blood pressure consistently over 140/90  I will take my blood pressure medications as directed   I will keep provider future appointment   I will follow a no added salt diet the best I can   Barriers: None  Strengths: Patient is already checking blood pressures daily  Date to Achieve By:11/1/2019  Patient expressed understanding of goal: Yes             Goal achieved at 100 %    Plan:   No un met needs, no further care coordination is needed at this time    [facility] Jones RN Care Coordinator   [facility] / [facility] -[facility]   Phone: [phone]  Email :  [email or web address]

## 2020-05-26 ENCOUNTER — TELEPHONE (OUTPATIENT)
Dept: OBGYN | Facility: CLINIC | Age: 85
End: 2020-05-26

## 2020-05-26 NOTE — TELEPHONE ENCOUNTER
"Info passed onto scheduling- They will contact p[t to schedule on his \"add on day\"  Yoselyn Dial RN on 5/26/2020 at 12:45 PM      "

## 2020-05-26 NOTE — TELEPHONE ENCOUNTER
"Pt calling and wants to come in to see Dr Pollard because she has a sore on her labia and noticed a scant blood on her pad this am.  Denies burning w urination.  Just back from [place] and would like to be seen sooner than 30 days from now. \"Remember I am 89.\"    Routing to Dr Pollard to advise-     Yoselyn Dial RN on 5/26/2020 at 10:15 AM  "

## 2020-06-01 ENCOUNTER — OFFICE VISIT (OUTPATIENT)
Dept: OBGYN | Facility: CLINIC | Age: 85
End: 2020-06-01
Payer: MEDICARE

## 2020-06-01 VITALS
BODY MASS INDEX: 29.84 KG/M2 | WEIGHT: 168.4 LBS | HEART RATE: 82 BPM | DIASTOLIC BLOOD PRESSURE: 66 MMHG | SYSTOLIC BLOOD PRESSURE: 176 MMHG | HEIGHT: 63 IN

## 2020-06-01 DIAGNOSIS — R30.0 DYSURIA: Primary | ICD-10-CM

## 2020-06-01 DIAGNOSIS — R10.2: ICD-10-CM

## 2020-06-01 DIAGNOSIS — N81.6 CYSTOCELE WITH RECTOCELE: ICD-10-CM

## 2020-06-01 DIAGNOSIS — N81.10 CYSTOCELE WITH RECTOCELE: ICD-10-CM

## 2020-06-01 DIAGNOSIS — M25.472 ANKLE SWELLING, LEFT: ICD-10-CM

## 2020-06-01 LAB
ALBUMIN UR-MCNC: ABNORMAL MG/DL
APPEARANCE UR: CLEAR
BACTERIA #/AREA URNS HPF: ABNORMAL /HPF
BILIRUB UR QL STRIP: NEGATIVE
COLOR UR AUTO: YELLOW
GLUCOSE UR STRIP-MCNC: NEGATIVE MG/DL
HGB UR QL STRIP: NEGATIVE
KETONES UR STRIP-MCNC: NEGATIVE MG/DL
LEUKOCYTE ESTERASE UR QL STRIP: NEGATIVE
NITRATE UR QL: NEGATIVE
NON-SQ EPI CELLS #/AREA URNS LPF: ABNORMAL /LPF
PH UR STRIP: 6.5 PH (ref 5–7)
RBC #/AREA URNS AUTO: ABNORMAL /HPF
SOURCE: ABNORMAL
SP GR UR STRIP: 1.01 (ref 1–1.03)
UROBILINOGEN UR STRIP-ACNC: 0.2 EU/DL (ref 0.2–1)
WBC #/AREA URNS AUTO: ABNORMAL /HPF

## 2020-06-01 PROCEDURE — 81001 URINALYSIS AUTO W/SCOPE: CPT | Performed by: OBSTETRICS & GYNECOLOGY

## 2020-06-01 PROCEDURE — 99214 OFFICE O/P EST MOD 30 MIN: CPT | Performed by: OBSTETRICS & GYNECOLOGY

## 2020-06-01 ASSESSMENT — MIFFLIN-ST. JEOR: SCORE: 1137.99
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
## 2020-06-08 DIAGNOSIS — I10 ESSENTIAL HYPERTENSION, BENIGN: ICD-10-CM

## 2020-06-09 RX ORDER — AMLODIPINE BESYLATE 2.5 MG/1
TABLET ORAL
Qty: 90 TABLET | Refills: 3 | Status: SHIPPED | OUTPATIENT
Start: 2020-06-09 | End: 2021-02-11

## 2020-06-09 NOTE — TELEPHONE ENCOUNTER
Routing refill request to provider for review/approval because:  Labs out of range:  Creatinine   BPs above 140/90 goal also  Carmela TAMEZ RN

## 2020-06-22 ENCOUNTER — APPOINTMENT (OUTPATIENT)
Age: 85
Setting detail: DERMATOLOGY
End: 2020-06-29

## 2020-06-22 DIAGNOSIS — L82.0 INFLAMED SEBORRHEIC KERATOSIS: ICD-10-CM

## 2020-06-22 DIAGNOSIS — L11.1 TRANSIENT ACANTHOLYTIC DERMATOSIS [GROVER]: ICD-10-CM

## 2020-06-22 DIAGNOSIS — D485 NEOPLASM OF UNCERTAIN BEHAVIOR OF SKIN: ICD-10-CM

## 2020-06-22 PROBLEM — D48.5 NEOPLASM OF UNCERTAIN BEHAVIOR OF SKIN: Status: ACTIVE | Noted: 2020-06-22

## 2020-06-22 PROCEDURE — OTHER LIQUID NITROGEN: OTHER

## 2020-06-22 PROCEDURE — OTHER MIPS QUALITY: OTHER

## 2020-06-22 PROCEDURE — 11102 TANGNTL BX SKIN SINGLE LES: CPT | Mod: 59

## 2020-06-22 PROCEDURE — 99201: CPT | Mod: 25

## 2020-06-22 PROCEDURE — OTHER BIOPSY BY SHAVE METHOD: OTHER

## 2020-06-22 PROCEDURE — OTHER COUNSELING: OTHER

## 2020-06-22 PROCEDURE — 17110 DESTRUCT B9 LESION 1-14: CPT

## 2020-06-22 ASSESSMENT — LOCATION ZONE DERM
LOCATION ZONE: ARM
LOCATION ZONE: TRUNK

## 2020-06-22 ASSESSMENT — LOCATION SIMPLE DESCRIPTION DERM
LOCATION SIMPLE: RIGHT FOREARM
LOCATION SIMPLE: RIGHT WRIST
LOCATION SIMPLE: UPPER BACK

## 2020-06-22 ASSESSMENT — LOCATION DETAILED DESCRIPTION DERM
LOCATION DETAILED: RIGHT DISTAL RADIAL DORSAL FOREARM
LOCATION DETAILED: RIGHT MEDIAL DORSAL WRIST
LOCATION DETAILED: SUPERIOR THORACIC SPINE
LOCATION DETAILED: RIGHT DISTAL ULNAR DORSAL FOREARM
LOCATION DETAILED: RIGHT PROXIMAL RADIAL DORSAL FOREARM

## 2020-06-22 NOTE — PROCEDURE: MIPS QUALITY
Quality 265: Biopsy Follow-Up: Biopsy results reviewed, communicated, tracked, and documented
Quality 110: Preventive Care And Screening: Influenza Immunization: Influenza Immunization Administered during Influenza season
Detail Level: Detailed
Quality 431: Preventive Care And Screening: Unhealthy Alcohol Use - Screening: Patient screened for unhealthy alcohol use using a single question and scores less than 2 times per year
Quality 226: Preventive Care And Screening: Tobacco Use: Screening And Cessation Intervention: Patient screened for tobacco use and is an ex/non-smoker
Quality 130: Documentation Of Current Medications In The Medical Record: Current Medications Documented

## 2020-06-22 NOTE — PROCEDURE: BIOPSY BY SHAVE METHOD
Silver Nitrate Text: The wound bed was treated with silver nitrate after the biopsy was performed.
Biopsy Type: H and E
Validate Note Data (See Information Below): Yes
Depth Of Biopsy: dermis
Curettage Text: The wound bed was treated with curettage after the biopsy was performed.
Hide Anesthesia Volume?: No
X Size Of Lesion In Cm: 0.7
Billing Type: Third-Party Bill
Post-Care Instructions: I reviewed with the patient in detail post-care instructions. Patient is to keep the biopsy site dry overnight, and then apply bacitracin twice daily until healed. Patient may apply hydrogen peroxide soaks to remove any crusting.
Wound Care: Petrolatum
Size Of Lesion In Cm: 0.9
Consent: Written consent was obtained and risks were reviewed including but not limited to scarring, infection, bleeding, scabbing, incomplete removal, nerve damage and allergy to anesthesia.
Path Notes (To The Dermatopathologist): Please check margins
Body Location Override (Optional - Billing Will Still Be Based On Selected Body Map Location If Applicable): R dorsal ulnar wrist
Type Of Destruction Used: Electrodesiccation
Detail Level: Detailed
Electrodesiccation And Curettage Text: The wound bed was treated with electrodesiccation and curettage after the biopsy was performed.
Anesthesia Volume In Cc (Will Not Render If 0): 0.4
Information: Selecting Yes will display possible errors in your note based on the variables you have selected. This validation is only offered as a suggestion for you. PLEASE NOTE THAT THE VALIDATION TEXT WILL BE REMOVED WHEN YOU FINALIZE YOUR NOTE. IF YOU WANT TO FAX A PRELIMINARY NOTE YOU WILL NEED TO TOGGLE THIS TO 'NO' IF YOU DO NOT WANT IT IN YOUR FAXED NOTE.
Biopsy Method: double edge Personna blade
Electrodesiccation Text: The wound bed was treated with electrodesiccation after the biopsy was performed.
Notification Instructions: Patient will be notified of biopsy results. However, patient instructed to call the office if not contacted within 2 weeks.
Dressing: pressure dressing
Cryotherapy Text: The wound bed was treated with cryotherapy after the biopsy was performed.
Additional Anesthesia Volume In Cc (Will Not Render If 0): 0
Anesthesia Type: 1% lidocaine with epinephrine and a 1:10 solution of 8.4% sodium bicarbonate

## 2020-06-22 NOTE — PROCEDURE: LIQUID NITROGEN
Post-Care Instructions: I reviewed with the patient in detail post-care instructions. Patient is to wear sunprotection, and avoid picking at any of the treated lesions. Pt may apply Vaseline to crusted or scabbing areas.
Medical Necessity Information: It is in your best interest to select a reason for this procedure from the list below. All of these items fulfill various CMS LCD requirements except the new and changing color options.
Detail Level: Detailed
Number Of Freeze-Thaw Cycles: 1 freeze-thaw cycle
Consent: The patient's consent was obtained including but not limited to risks of crusting, scabbing, blistering, scarring, darker or lighter pigmentary change, recurrence, incomplete removal and infection.
Render Note In Bullet Format When Appropriate: No
Duration Of Freeze Thaw-Cycle (Seconds): 15-20
Medical Necessity Clause: This procedure was medically necessary because the lesions that were treated were:

## 2020-07-01 DIAGNOSIS — I10 ESSENTIAL HYPERTENSION, BENIGN: ICD-10-CM

## 2020-07-02 RX ORDER — LOSARTAN POTASSIUM 100 MG/1
TABLET ORAL
Qty: 30 TABLET | Refills: 0 | Status: SHIPPED | OUTPATIENT
Start: 2020-07-02 | End: 2020-07-30

## 2020-07-02 NOTE — TELEPHONE ENCOUNTER
Routing refill request to provider for review/approval because:  Labs out of range:  Creat  Added in pharm comments to schedule.  Please authorize if appropriate.  Thanks,  Estefani Cuevas RN       Return in about 3 weeks (around 7/3/2019) for Blood Pressure Check with fasting lab tests.

## 2020-07-20 ENCOUNTER — APPOINTMENT (OUTPATIENT)
Age: 85
Setting detail: DERMATOLOGY
End: 2020-07-26

## 2020-07-20 DIAGNOSIS — L82.0 INFLAMED SEBORRHEIC KERATOSIS: ICD-10-CM

## 2020-07-20 PROCEDURE — OTHER COUNSELING: OTHER

## 2020-07-20 PROCEDURE — 99212 OFFICE O/P EST SF 10 MIN: CPT

## 2020-07-20 PROCEDURE — OTHER MIPS QUALITY: OTHER

## 2020-07-20 ASSESSMENT — LOCATION DETAILED DESCRIPTION DERM: LOCATION DETAILED: RIGHT DISTAL RADIAL DORSAL FOREARM

## 2020-07-20 ASSESSMENT — LOCATION SIMPLE DESCRIPTION DERM: LOCATION SIMPLE: RIGHT FOREARM

## 2020-07-20 ASSESSMENT — LOCATION ZONE DERM: LOCATION ZONE: ARM

## 2020-07-20 NOTE — PROCEDURE: MIPS QUALITY
Quality 431: Preventive Care And Screening: Unhealthy Alcohol Use - Screening: Patient screened for unhealthy alcohol use using a single question and scores less than 2 times per year
Quality 110: Preventive Care And Screening: Influenza Immunization: Influenza Immunization Administered during Influenza season
Detail Level: Detailed
Quality 130: Documentation Of Current Medications In The Medical Record: Current Medications Documented
Quality 265: Biopsy Follow-Up: Biopsy results reviewed, communicated, tracked, and documented
Quality 226: Preventive Care And Screening: Tobacco Use: Screening And Cessation Intervention: Patient screened for tobacco use and is an ex/non-smoker

## 2020-07-29 DIAGNOSIS — I10 ESSENTIAL HYPERTENSION, BENIGN: ICD-10-CM

## 2020-07-30 DIAGNOSIS — I10 ESSENTIAL HYPERTENSION, BENIGN: ICD-10-CM

## 2020-07-30 RX ORDER — LOSARTAN POTASSIUM 100 MG/1
TABLET ORAL
Qty: 30 TABLET | Refills: 0 | Status: SHIPPED | OUTPATIENT
Start: 2020-07-30 | End: 2020-08-03

## 2020-07-30 NOTE — TELEPHONE ENCOUNTER
Routing refill request to provider for review/approval because:  Labs out of range:  Creat and BP.  Added in pharm comments to schedule - due for apt.  Please authorize if appropriate.  Thanks,  Estefani Cuevas RN       Return in about 3 weeks (around 7/3/2019) for Blood Pressure Check with fasting lab tests

## 2020-08-03 RX ORDER — LOSARTAN POTASSIUM 100 MG/1
TABLET ORAL
Qty: 90 TABLET | Refills: 3 | Status: SHIPPED | OUTPATIENT
Start: 2020-08-03

## 2021-01-19 ENCOUNTER — TELEPHONE (OUTPATIENT)
Dept: FAMILY MEDICINE | Facility: CLINIC | Age: 86
End: 2021-01-19

## 2021-01-19 DIAGNOSIS — N39.9 URINARY PROBLEM IN FEMALE: Primary | ICD-10-CM

## 2021-01-19 NOTE — TELEPHONE ENCOUNTER
Reason for Call:  Other appointment and call back    Detailed comments: patient would like to have a urianalysis done and if some thing is wrong she will make an appointment     Phone Number Patient can be reached at: Home number on file [phone] (home)    Best Time: anytime    Can we leave a detailed message on this number? YES    Call taken on 1/19/2021 at 2:19 PM by Sandee Boles

## 2021-01-19 NOTE — TELEPHONE ENCOUNTER
"PCP,    Called pt for more info  Her urine is a \"little light brownish\"  Denies burning   Denies urgency or frequency  Denies flank pain  She says she's never been a good water drinker    She would like UA just to check things out. If anything detected, she'll make an appt    Ok to leave detailed VM    Thank you,  Medardo MARCIAL RN  "

## 2021-01-21 ENCOUNTER — ALLIED HEALTH/NURSE VISIT (OUTPATIENT)
Dept: NURSING | Facility: CLINIC | Age: 86
End: 2021-01-21
Payer: MEDICARE

## 2021-01-21 VITALS — SYSTOLIC BLOOD PRESSURE: 183 MMHG | HEART RATE: 78 BPM | DIASTOLIC BLOOD PRESSURE: 81 MMHG | OXYGEN SATURATION: 98 %

## 2021-01-21 DIAGNOSIS — N39.9 URINARY PROBLEM IN FEMALE: ICD-10-CM

## 2021-01-21 DIAGNOSIS — I10 ESSENTIAL HYPERTENSION: Primary | ICD-10-CM

## 2021-01-21 DIAGNOSIS — R82.90 NONSPECIFIC FINDING ON EXAMINATION OF URINE: Primary | ICD-10-CM

## 2021-01-21 LAB
ALBUMIN UR-MCNC: 30 MG/DL
APPEARANCE UR: ABNORMAL
BACTERIA #/AREA URNS HPF: ABNORMAL /HPF
BILIRUB UR QL STRIP: NEGATIVE
COLOR UR AUTO: YELLOW
GLUCOSE UR STRIP-MCNC: NEGATIVE MG/DL
HGB UR QL STRIP: ABNORMAL
KETONES UR STRIP-MCNC: NEGATIVE MG/DL
LEUKOCYTE ESTERASE UR QL STRIP: ABNORMAL
NITRATE UR QL: POSITIVE
NON-SQ EPI CELLS #/AREA URNS LPF: ABNORMAL /LPF
PH UR STRIP: 6 PH (ref 5–7)
RBC #/AREA URNS AUTO: ABNORMAL /HPF
SOURCE: ABNORMAL
SP GR UR STRIP: 1.01 (ref 1–1.03)
UROBILINOGEN UR STRIP-ACNC: 0.2 EU/DL (ref 0.2–1)
WBC #/AREA URNS AUTO: ABNORMAL /HPF

## 2021-01-21 PROCEDURE — 81001 URINALYSIS AUTO W/SCOPE: CPT | Performed by: INTERNAL MEDICINE

## 2021-01-21 PROCEDURE — 99207 PR NO CHARGE NURSE ONLY: CPT

## 2021-01-21 PROCEDURE — 87186 SC STD MICRODIL/AGAR DIL: CPT | Performed by: INTERNAL MEDICINE

## 2021-01-21 PROCEDURE — 87088 URINE BACTERIA CULTURE: CPT | Performed by: INTERNAL MEDICINE

## 2021-01-21 PROCEDURE — 87086 URINE CULTURE/COLONY COUNT: CPT | Performed by: INTERNAL MEDICINE

## 2021-01-21 RX ORDER — CIPROFLOXACIN 250 MG/1
250 TABLET, FILM COATED ORAL 2 TIMES DAILY
Qty: 10 TABLET | Refills: 0 | Status: SHIPPED | OUTPATIENT
Start: 2021-01-21 | End: 2021-02-11

## 2021-01-21 NOTE — Clinical Note
Tanisha Muhammad is a 89 year old patient who comes in today for a Blood Pressure check.  Initial BP:  BP (!) 183/81 (BP Location: Right arm, Cuff Size: Adult Regular)   Pulse 78   SpO2 98%      78  Disposition: provider notified while patient in the clinic and BP elevated.      Destini Dominguez MA

## 2021-01-21 NOTE — RESULT ENCOUNTER NOTE
UA positive  Prescription for cipro sent to pharmacy  Allergies to sulfa and keflex   Please inform patient

## 2021-01-21 NOTE — LETTER
January 25, 2021      Tanisha OCHOA Sabina  [address]   [facility] [address]        Dear MsSandraClemenciaaleah,    The following letter pertains to your most recent diagnostic tests:     The urine infection should respond to the ciprofloxacin that was prescribed for your urinary tract infection.       Sincerely,     Dr. Cuevas     Resulted Orders   UA with Microscopic reflex to Culture   Result Value Ref Range    Color Urine Yellow     Appearance Urine Cloudy     Glucose Urine Negative NEG^Negative mg/dL    Bilirubin Urine Negative NEG^Negative    Ketones Urine Negative NEG^Negative mg/dL    Specific Gravity Urine 1.010 1.003 - 1.035    pH Urine 6.0 5.0 - 7.0 pH    Protein Albumin Urine 30 (A) NEG^Negative mg/dL    Urobilinogen Urine 0.2 0.2 - 1.0 EU/dL    Nitrite Urine Positive (A) NEG^Negative    Blood Urine Trace (A) NEG^Negative    Leukocyte Esterase Urine Small (A) NEG^Negative    Source Midstream Urine     WBC Urine 25-50 (A) OTO5^0 - 5 /HPF    RBC Urine O - 2 OTO2^O - 2 /HPF    Squamous Epithelial /LPF Urine Moderate (A) FEW^Few /LPF    Bacteria Urine Many (A) NEG^Negative /HPF   Urine Culture Aerobic Bacterial   Result Value Ref Range    Specimen Description Midstream Urine     Special Requests Specimen received in preservative     Culture Micro >100,000 colonies/mL  Escherichia coli   (A)

## 2021-01-21 NOTE — PROGRESS NOTES
She should increase amlodipine to 2 X 2.5mg = 5mg  once daily and schedule virtual visit to discuss blood pressure management

## 2021-01-22 LAB
BACTERIA SPEC CULT: ABNORMAL
Lab: ABNORMAL
SPECIMEN SOURCE: ABNORMAL

## 2021-01-23 NOTE — RESULT ENCOUNTER NOTE
The following letter pertains to your most recent diagnostic tests:    The urine infection should respond to the ciprofloxacin that was prescribed for your urinary tract infection.      Sincerely,    Dr. Cuevas

## 2021-01-25 ENCOUNTER — HOSPITAL ENCOUNTER (EMERGENCY)
Facility: CLINIC | Age: 86
Discharge: HOME | End: 2021-01-25
Attending: EMERGENCY MEDICINE | Admitting: EMERGENCY MEDICINE
Payer: MEDICARE

## 2021-01-25 VITALS
TEMPERATURE: 97.4 F | HEIGHT: 62 IN | WEIGHT: 150 LBS | OXYGEN SATURATION: 98 % | RESPIRATION RATE: 16 BRPM | SYSTOLIC BLOOD PRESSURE: 208 MMHG | HEART RATE: 85 BPM | BODY MASS INDEX: 27.6 KG/M2 | DIASTOLIC BLOOD PRESSURE: 98 MMHG

## 2021-01-25 DIAGNOSIS — I10 BENIGN ESSENTIAL HYPERTENSION: ICD-10-CM

## 2021-01-25 LAB
ANION GAP SERPL CALCULATED.3IONS-SCNC: 8 MMOL/L (ref 3–14)
BASOPHILS # BLD AUTO: 0 10E9/L (ref 0–0.2)
BASOPHILS NFR BLD AUTO: 0.6 %
BUN SERPL-MCNC: 22 MG/DL (ref 7–30)
CALCIUM SERPL-MCNC: 9.8 MG/DL (ref 8.5–10.1)
CHLORIDE SERPL-SCNC: 111 MMOL/L (ref 94–109)
CO2 SERPL-SCNC: 22 MMOL/L (ref 20–32)
CREAT SERPL-MCNC: 1.07 MG/DL (ref 0.52–1.04)
DIFFERENTIAL METHOD BLD: ABNORMAL
EOSINOPHIL # BLD AUTO: 0 10E9/L (ref 0–0.7)
EOSINOPHIL NFR BLD AUTO: 0.2 %
ERYTHROCYTE [DISTWIDTH] IN BLOOD BY AUTOMATED COUNT: 13 % (ref 10–15)
GFR SERPL CREATININE-BSD FRML MDRD: 44 ML/MIN/{1.73_M2}
GLUCOSE SERPL-MCNC: 96 MG/DL (ref 70–99)
HCT VFR BLD AUTO: 36.1 % (ref 35–47)
HGB BLD-MCNC: 12.4 G/DL (ref 11.7–15.7)
IMM GRANULOCYTES # BLD: 0 10E9/L (ref 0–0.4)
IMM GRANULOCYTES NFR BLD: 0.6 %
INTERPRETATION ECG - MUSE: NORMAL
LYMPHOCYTES # BLD AUTO: 1.4 10E9/L (ref 0.8–5.3)
LYMPHOCYTES NFR BLD AUTO: 28.8 %
MCH RBC QN AUTO: 28.8 PG (ref 26.5–33)
MCHC RBC AUTO-ENTMCNC: 34.3 G/DL (ref 31.5–36.5)
MCV RBC AUTO: 84 FL (ref 78–100)
MONOCYTES # BLD AUTO: 0.6 10E9/L (ref 0–1.3)
MONOCYTES NFR BLD AUTO: 12.1 %
NEUTROPHILS # BLD AUTO: 2.8 10E9/L (ref 1.6–8.3)
NEUTROPHILS NFR BLD AUTO: 57.7 %
NRBC # BLD AUTO: 0 10*3/UL
NRBC BLD AUTO-RTO: 0 /100
PLATELET # BLD AUTO: 108 10E9/L (ref 150–450)
POTASSIUM SERPL-SCNC: 3.9 MMOL/L (ref 3.4–5.3)
RBC # BLD AUTO: 4.3 10E12/L (ref 3.8–5.2)
SODIUM SERPL-SCNC: 141 MMOL/L (ref 133–144)
TROPONIN I SERPL-MCNC: 0.02 UG/L (ref 0–0.04)
WBC # BLD AUTO: 4.8 10E9/L (ref 4–11)

## 2021-01-25 PROCEDURE — 93005 ELECTROCARDIOGRAM TRACING: CPT

## 2021-01-25 PROCEDURE — 99284 EMERGENCY DEPT VISIT MOD MDM: CPT

## 2021-01-25 PROCEDURE — 85025 COMPLETE CBC W/AUTO DIFF WBC: CPT | Performed by: EMERGENCY MEDICINE

## 2021-01-25 PROCEDURE — 250N000013 HC RX MED GY IP 250 OP 250 PS 637: Mod: GY | Performed by: EMERGENCY MEDICINE

## 2021-01-25 PROCEDURE — 84484 ASSAY OF TROPONIN QUANT: CPT | Performed by: EMERGENCY MEDICINE

## 2021-01-25 PROCEDURE — 80048 BASIC METABOLIC PNL TOTAL CA: CPT | Performed by: EMERGENCY MEDICINE

## 2021-01-25 RX ORDER — AMLODIPINE BESYLATE 5 MG/1
5 TABLET ORAL ONCE
Status: COMPLETED | OUTPATIENT
Start: 2021-01-25 | End: 2021-01-25

## 2021-01-25 RX ADMIN — AMLODIPINE BESYLATE 5 MG: 5 TABLET ORAL at 19:08

## 2021-01-25 ASSESSMENT — ENCOUNTER SYMPTOMS
SHORTNESS OF BREATH: 0
WEAKNESS: 0

## 2021-01-25 ASSESSMENT — MIFFLIN-ST. JEOR: SCORE: 1033.65

## 2021-01-25 NOTE — ED TRIAGE NOTES
Blood pressure has been high since last night. Blood pressure was 220 systolic. Took all the bp medications, did not help.
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
## 2021-01-26 NOTE — DISCHARGE INSTRUCTIONS
As discussed, your blood pressure is high today though there are no findings that you have done any damage to your self.  I recommend you increase your amlodipine to 3 tabs in the morning (7.5 mg/day).  Continue your losartan at 100 mg daily.  It is imperative you start to check your blood pressure twice a day and record these values and follow-up with your primary doctor to determine if further blood pressure medicine changes are indicated.  You should go back to 2 tabs of the amlodipine if you are having low blood pressure readings (anything less than 100/60) or if you are feeling lightheaded.  Never hesitate to return to the emergency department if you experience chest pain, shortness of breath, lightheadedness, focal weakness or numbness, vision changes, or any other emergent concerns.    Discharge Instructions  Hypertension - High Blood Pressure    During you visit to the Emergency Department, your blood pressure was higher than the recommended blood pressure.  This may be related to stress, pain, medication or other temporary conditions. In these cases, your blood pressure may return to normal on its own. If you have a history of high blood pressure, you may need to have your provider adjust your medications. Sometimes, your high measurement here may indicate that you have developed high blood pressure that will stay high unless it is treated. As a general rule, high blood pressure causes problems over years rather than days, weeks, or months. So, while it is important to treat blood pressure, it is rarely important to treat blood pressure immediately. Occasionally we will begin a medication in the Emergency Department; more often we will recommend close follow-up for medications with a primary doctor/clinic.    Generally, every Emergency Department visit should have a follow-up clinic visit with either a primary or a specialty clinic/provider. Please follow-up as instructed by your emergency provider  today.    Return to the Emergency Department if you start to have:  A severe headache.  Chest pain.  Shortness of breath.  Weakness or numbness that affects one part of the body.  Confusion.  Vision changes.  Significant swelling of legs and/or eyes.  A reaction to any medication started in the Emergency Department.    What can I do to help myself?  Avoid alcohol.  Take any blood pressure medicine that you are prescribed.  Get a good night s sleep.  Lower your salt intake.  Exercise.  Lose weight.  Manage stress.  See your doctor regularly    If blood pressure medication was started in the Emergency Department:  The medicine may not have an immediate effect. The body and brain determine what blood pressure you have. The medicine s job is to retrain the body s  thermostat  to a lower blood pressure.  You will need to follow up with your provider to see how this medicine is working for you.  If you were given a prescription for medicine here today, be sure to read all of the information (including the package insert) that comes with your prescription.  This will include important information about the medicine, its side effects, and any warnings that you need to know about.  The pharmacist who fills the prescription can provide more information and answer questions you may have about the medicine.  If you have questions or concerns that the pharmacist cannot address, please call or return to the Emergency Department.   Remember that you can always come back to the Emergency Department if you are not able to see your regular provider in the amount of time listed above, if you get any new symptoms, or if there is anything that worries you.

## 2021-02-04 ENCOUNTER — APPOINTMENT (OUTPATIENT)
Dept: CT IMAGING | Facility: CLINIC | Age: 86
End: 2021-02-04
Attending: EMERGENCY MEDICINE
Payer: MEDICARE

## 2021-02-04 ENCOUNTER — HOSPITAL ENCOUNTER (EMERGENCY)
Facility: CLINIC | Age: 86
Discharge: HOME | End: 2021-02-04
Attending: EMERGENCY MEDICINE | Admitting: EMERGENCY MEDICINE
Payer: MEDICARE

## 2021-02-04 ENCOUNTER — TELEPHONE (OUTPATIENT)
Dept: FAMILY MEDICINE | Facility: CLINIC | Age: 86
End: 2021-02-04

## 2021-02-04 VITALS
TEMPERATURE: 97.8 F | OXYGEN SATURATION: 97 % | DIASTOLIC BLOOD PRESSURE: 68 MMHG | RESPIRATION RATE: 14 BRPM | SYSTOLIC BLOOD PRESSURE: 141 MMHG | HEART RATE: 94 BPM

## 2021-02-04 DIAGNOSIS — G44.209 TENSION HEADACHE: ICD-10-CM

## 2021-02-04 DIAGNOSIS — I10 BENIGN ESSENTIAL HYPERTENSION: ICD-10-CM

## 2021-02-04 LAB
ANION GAP SERPL CALCULATED.3IONS-SCNC: 8 MMOL/L (ref 3–14)
BASOPHILS # BLD AUTO: 0 10E9/L (ref 0–0.2)
BASOPHILS NFR BLD AUTO: 0.4 %
BUN SERPL-MCNC: 29 MG/DL (ref 7–30)
CALCIUM SERPL-MCNC: 9.7 MG/DL (ref 8.5–10.1)
CHLORIDE SERPL-SCNC: 107 MMOL/L (ref 94–109)
CO2 SERPL-SCNC: 23 MMOL/L (ref 20–32)
CREAT SERPL-MCNC: 1.1 MG/DL (ref 0.52–1.04)
DIFFERENTIAL METHOD BLD: ABNORMAL
EOSINOPHIL # BLD AUTO: 0 10E9/L (ref 0–0.7)
EOSINOPHIL NFR BLD AUTO: 0.4 %
ERYTHROCYTE [DISTWIDTH] IN BLOOD BY AUTOMATED COUNT: 12.7 % (ref 10–15)
GFR SERPL CREATININE-BSD FRML MDRD: 43 ML/MIN/{1.73_M2}
GLUCOSE SERPL-MCNC: 108 MG/DL (ref 70–99)
HCT VFR BLD AUTO: 36.3 % (ref 35–47)
HGB BLD-MCNC: 12.3 G/DL (ref 11.7–15.7)
IMM GRANULOCYTES # BLD: 0 10E9/L (ref 0–0.4)
IMM GRANULOCYTES NFR BLD: 0.6 %
INTERPRETATION ECG - MUSE: NORMAL
LYMPHOCYTES # BLD AUTO: 1.3 10E9/L (ref 0.8–5.3)
LYMPHOCYTES NFR BLD AUTO: 28.2 %
MCH RBC QN AUTO: 28.7 PG (ref 26.5–33)
MCHC RBC AUTO-ENTMCNC: 33.9 G/DL (ref 31.5–36.5)
MCV RBC AUTO: 85 FL (ref 78–100)
MONOCYTES # BLD AUTO: 0.5 10E9/L (ref 0–1.3)
MONOCYTES NFR BLD AUTO: 11.4 %
NEUTROPHILS # BLD AUTO: 2.7 10E9/L (ref 1.6–8.3)
NEUTROPHILS NFR BLD AUTO: 59 %
NRBC # BLD AUTO: 0 10*3/UL
NRBC BLD AUTO-RTO: 0 /100
PLATELET # BLD AUTO: 121 10E9/L (ref 150–450)
POTASSIUM SERPL-SCNC: 3.9 MMOL/L (ref 3.4–5.3)
RBC # BLD AUTO: 4.29 10E12/L (ref 3.8–5.2)
SODIUM SERPL-SCNC: 138 MMOL/L (ref 133–144)
TROPONIN I SERPL-MCNC: <0.015 UG/L (ref 0–0.04)
WBC # BLD AUTO: 4.7 10E9/L (ref 4–11)

## 2021-02-04 PROCEDURE — 250N000013 HC RX MED GY IP 250 OP 250 PS 637: Mod: GY | Performed by: EMERGENCY MEDICINE

## 2021-02-04 PROCEDURE — 84484 ASSAY OF TROPONIN QUANT: CPT | Performed by: EMERGENCY MEDICINE

## 2021-02-04 PROCEDURE — 70450 CT HEAD/BRAIN W/O DYE: CPT | Mod: ME

## 2021-02-04 PROCEDURE — 250N000011 HC RX IP 250 OP 636: Performed by: EMERGENCY MEDICINE

## 2021-02-04 PROCEDURE — 80048 BASIC METABOLIC PNL TOTAL CA: CPT | Performed by: EMERGENCY MEDICINE

## 2021-02-04 PROCEDURE — 93005 ELECTROCARDIOGRAM TRACING: CPT

## 2021-02-04 PROCEDURE — 99285 EMERGENCY DEPT VISIT HI MDM: CPT | Mod: 25

## 2021-02-04 PROCEDURE — 85025 COMPLETE CBC W/AUTO DIFF WBC: CPT | Performed by: EMERGENCY MEDICINE

## 2021-02-04 PROCEDURE — 96374 THER/PROPH/DIAG INJ IV PUSH: CPT

## 2021-02-04 RX ORDER — LORAZEPAM 0.5 MG/1
0.25 TABLET ORAL ONCE
Status: COMPLETED | OUTPATIENT
Start: 2021-02-04 | End: 2021-02-04

## 2021-02-04 RX ORDER — HYDRALAZINE HYDROCHLORIDE 20 MG/ML
10 INJECTION INTRAMUSCULAR; INTRAVENOUS ONCE
Status: COMPLETED | OUTPATIENT
Start: 2021-02-04 | End: 2021-02-04

## 2021-02-04 RX ADMIN — HYDRALAZINE HYDROCHLORIDE 10 MG: 20 INJECTION INTRAMUSCULAR; INTRAVENOUS at 01:32

## 2021-02-04 RX ADMIN — LORAZEPAM 0.25 MG: 0.5 TABLET ORAL at 02:29

## 2021-02-04 ASSESSMENT — ENCOUNTER SYMPTOMS
CONFUSION: 0
HEADACHES: 1

## 2021-02-04 NOTE — TELEPHONE ENCOUNTER
/84 upon awakening  , 156/85 pulse 93 as of 8:45 am  . No headache. No blurred vision .Pt was in ER last night and was told to call PCP to change her medication. Laura Pardo RN

## 2021-02-04 NOTE — TELEPHONE ENCOUNTER
Called patient with advise from Dr Cuevas.     Patient states being very concerned about her high BP.     When writer advised patient to Increase Amlodipine to 5 mg daily---patient states she has been taking Amlodipine 7.5mg daily since ED visit 1/25/21.     Confirms she already took Amlodipine 7.5 mg today along with Losartan 100 mg.      Changed patient's appointment in clinic with Dr Cuevas from next week to tomorrow, per patient request.      Leticia MEJIA, RN

## 2021-02-04 NOTE — TELEPHONE ENCOUNTER
Called patient with Amlodiipine dose advise.     Patient will take Amlodipine 10 mg tomorrow morning along with Losartan 100mg.     Patient will keep and bring BP readings to appointment with PCP tomorrow.      Leticia MEJIA, RN

## 2021-02-04 NOTE — ED PROVIDER NOTES
History   Chief Complaint:  Hypertension    HPI   Tanisha Muhammad is a 89 year old female with history of hypertension and chronic kidney disease who presents with elevated blood pressures throughout the day today. The patient reports that she has a history of hypertension and checks her blood pressure regularly. Today she notes that her pressures have been running very high in the 200s/80s range. She is on Cozaar and Norvasc for her hypertension, but notes that these medications did not seem to help her blood pressure today. She currently notes having a pain near her left ear, but states it is not a headache. She otherwise denies any falls, head trauma, confusion or chest pain. The patient has not missed any doses of her medications. Per chart review, this is the patient's second visit for hypertension. She was seen in the emergency department originally on 01/25/2021. She was given Norvasc and amlodipine and was eventually discharged home.       Review of Systems   Cardiovascular: Negative for chest pain.   Neurological: Positive for headaches (pain near left ear).   Psychiatric/Behavioral: Negative for confusion.   All other systems reviewed and are negative.        Allergies:  Indomethacin  Keflex [Cephalexin]  Sulfamethoxazole-Trimethoprim    Medications:  Norvasc  Cipro  Cozaar  Deltasone     Past Medical History:    Cystocele  Elevated glucose   Gout  [place]'s disease   Inactive Meniere's disease  Post-menopausal bleeding  Rectocele  Trigeminal neuralgia  Hypertension  Venous insufficiency   Chronic kidney disease stage III     Past Surgical History:    Cataract IOL  Capsulotomy      Family History:    Mother: kidney cancer, hypertension  Father: heart disease, emphysema  Brother: myocardial infarction, renal cancer, heart disease   Sister: fibromyalgia     Social History:  The patient currently lives at home.  She arrives today via EMS.    Physical Exam     Patient Vitals for the past 24 hrs:   BP Temp  Temp src Pulse Resp SpO2   02/04/21 0300 (!) 141/68 -- -- 94 -- 97 %   02/04/21 0250 (!) 158/69 -- -- -- -- 97 %   02/04/21 0230 (!) 164/81 -- -- 96 -- 97 %   02/04/21 0220 (!) 172/75 -- -- -- -- 97 %   02/04/21 0150 (!) 186/77 -- -- 96 -- 97 %   02/04/21 0145 (!) 133/96 -- -- 94 -- 96 %   02/04/21 0130 (!) 176/78 -- -- 89 -- 97 %   02/04/21 0115 (!) 196/82 -- -- -- -- --   02/04/21 0056 (!) 204/83 97.8  F (36.6  C) Oral 89 14 97 %       Physical Exam  General: Patient is alert and anxious appearing.  HEENT: Head atraumatic    Eyes: pupils equal and reactive. Conjunctiva clear   Nares: patent   Oropharynx: no lesions, uvula midline, no palatal draping, normal voice, no trismus  Neck: Supple without lymphadenopathy, no meningismus  Chest: Heart regular rate and rhythm.   Lungs: Equal clear to auscultation with no wheeze or rales  Abdomen: Soft, non tender, nondistended, normal bowel sounds  Back: No costovertebral angle tenderness, no midline C, T or L spine tenderness  Neuro: Grossly nonfocal, normal speech, strength equal bilaterally, CN 2-12 intact, normal finger-to-nose, no pronator drift  Extremities: No deformities, equal radial and DP pulses. No clubbing, cyanosis.  No edema  Skin: Warm and dry with no rash.       Emergency Department Course     ECG:  Indication: hypertension  Completed at 0149.  Read at 0205.   Rate 90 bpm. DC interval 234. QRS duration 86. QT/QTc 358/437. P-R-T axes 46 -13 41.  Sinus rhythm with 1st degree AV block  Possible left atrial enlargement  Left ventricular hypertrophy  Inferior infarct, age undetermined     Imaging:  CT Head WO Contrast  1.  No acute intracranial process.  Reading per radiology.    Laboratory:  CBC:  (L) o/w WNL. (WBC 4.7, HGB 12.3)   BMP: Glucose 108 (H), Creatinine 1.10 (H), GFR 43 (L) o/w AWNL     Troponin (Collected 0131): <0.015     Emergency Department Course:    Reviewed:  I reviewed nursing notes, vitals, past medical history and care  everywhere    Assessments:  0107 I obtained history and examined the patient as noted above.   0251 I rechecked the patient and explained findings. The patient reports that she is feeling better. her most recent blood pressure is 158/69.    Interventions:  0132 Apresoline 10 mg IV   0229 Ativan 0.25 mg PO    Disposition:  The patient was discharged to home.       Impression & Plan     Medical Decision Making:  Tanisha Muhammad is a 89 year old female who presents for evaluation of elevated blood pressure.  There is  history of hypertension in the past.  The workup here is negative and the patient does not have any clinical, laboratory, ecg or historical signs of end-organ dysfunction.  There is no signs of hypertensive emergency or urgency.  Supportive outpatient management is therefore indicated with close follow-up of primary care physician.  Discussed with patient at length that I do not wish to start new medications at this time as I do not want to bottom out her pressures.  She had the most improvement in her pressure when treating her anxiety which I think is likely contributing to her high blood pressure tonight.  Recommend she call her primary care provider in the morning to further discuss medication changes.  She expressed agreement understanding of that plan.    Diagnosis:    ICD-10-CM    1. Benign essential hypertension  I10    2. Tension headache  G44.209        Discharge Medications:  None    Scribe Disclosure:  I, Iam Hendrix, am serving as a scribe at 1:04 AM on 2/4/2021 to document services personally performed by Rika Tran MD based on my observations and the provider's statements to me.            Rika Tran MD  02/04/21 4506

## 2021-02-04 NOTE — ED TRIAGE NOTES
Patient comes in via EMS from her private home for hypertension.   Patient has been taking her BP at home over the last few days and it has been elevated.  Taking medications as prescribed.  States she feels pulsating in her ears, other than that no other symptoms.  Denies headache/CP/SOB.  Some left lower extremity swelling noted.

## 2021-02-11 ENCOUNTER — TELEPHONE (OUTPATIENT)
Dept: FAMILY MEDICINE | Facility: CLINIC | Age: 86
End: 2021-02-11

## 2021-02-11 ENCOUNTER — OFFICE VISIT (OUTPATIENT)
Dept: FAMILY MEDICINE | Facility: CLINIC | Age: 86
End: 2021-02-11
Payer: MEDICARE

## 2021-02-11 DIAGNOSIS — I10 ESSENTIAL HYPERTENSION, BENIGN: Primary | ICD-10-CM

## 2021-02-11 DIAGNOSIS — N18.30 STAGE 3 CHRONIC KIDNEY DISEASE, UNSPECIFIED WHETHER STAGE 3A OR 3B CKD (H): ICD-10-CM

## 2021-02-11 PROCEDURE — 99442 PR PHYSICIAN TELEPHONE EVALUATION 11-20 MIN: CPT | Mod: 95 | Performed by: INTERNAL MEDICINE

## 2021-02-11 RX ORDER — TORSEMIDE 5 MG/1
5 TABLET ORAL DAILY
Qty: 90 TABLET | Refills: 3 | Status: SHIPPED | OUTPATIENT
Start: 2021-02-11

## 2021-02-11 RX ORDER — AMLODIPINE BESYLATE 10 MG/1
10 TABLET ORAL DAILY
Qty: 90 TABLET | Refills: 3 | Status: SHIPPED | OUTPATIENT
Start: 2021-02-11

## 2021-02-11 NOTE — TELEPHONE ENCOUNTER
"Pt called \"BP acting up\"     Pt states she won't come in today for OV - Does not like to run around when it is up     162/84 10:30am     Rechecked and didn't write down     Taking Losartan and Amlodipine as she is supposed to     Stats she is just going to lay down and see if it blows over     Advised pt looks like Pt rep changed from OV to phone visit already     Pt states okay I am okay with a phone visit     Beth PRESSLEY RN          "

## 2021-02-11 NOTE — PROGRESS NOTES
Jelly is a 89 year old who is being evaluated via a billable telephone visit.      What phone number would you like to be contacted at? [phone]  How would you like to obtain your AVS? Mail a copy    Assessment & Plan     Essential hypertension, benign  Increase amlodipine to 10 mg   Start low dose loop diuretic since she did not tolerate thiazide in past  Check labs in 2-3 weeks with MA blood pressure check  Given her age, goal BP < 150/90  - torsemide (DEMADEX) 5 MG tablet; Take 1 tablet (5 mg) by mouth daily Take in AM  - amLODIPine (NORVASC) 10 MG tablet; Take 1 tablet (10 mg) by mouth daily    Stage 3 chronic kidney disease, unspecified whether stage 3a or 3b CKD  Will need to monitor renal function closely with addition of diuretic         15 minutes spent on the date of the encounter doing chart review, history and exam, documentation and further activities as noted above            Return in about 2 weeks (around 2/25/2021) for lab appt for BMP and MA bp check appt that day too .  Patient instructed to return to clinic or contact us sooner if symptoms worsen or new symptoms develop.     Sesar Cuevas MD  [facility]   Jelly is a 89 year old who presents for the following health issues     HPI     Chief Complaint   Patient presents with     Hypertension      BP today reported by patient 165/87 Pulse 88         Amlodipine dose titrated to 7.5 daily, but blood pressure remains very high at times  No blood pressure readings that are too low or side effects from amlodipine  She remain very concerned that her blood pressures are too high with readings sometimes in the 170's and 180's and most readings in 150's to 160's  She is worried she may have to call 911 again about her blood pressure   No other new symptoms to suggest end organ damage     I suggested increasing amlodpine to 10 mg for ease of tablet administration and adding hydrochlorothiazide   She remembers taking  hydrochlorothiazide and not tolerating it  She cannot recall the specific side effect    Review of Systems         Objective           Vitals:  No vitals were obtained today due to virtual visit.    Physical Exam   healthy, alert and no distress  PSYCH: Alert and oriented times 3; coherent speech, normal   rate and volume, able to articulate logical thoughts, able   to abstract reason, no tangential thoughts, no hallucinations   or delusions  Her affect is anxious  RESP: No cough, no audible wheezing, able to talk in full sentences  Remainder of exam unable to be completed due to telephone visits                Phone call duration: 11 minutes

## 2021-02-15 NOTE — TELEPHONE ENCOUNTER
FYI:   Pt called in to report weekend blood pressures were normal.  Today's 136/72, unsure of pulse, unsure of yesterday's but reports it was normal too.     Is now taking and tolerating well the amlodipine 10mg.  Advised pt to stay on the 10mg amlodipine ongoing, and to let us know if bp's go too low/high, dizziness, light headedness or other new symptoms  Casie Self RN  [facility] RN Triage Team

## 2021-02-25 ENCOUNTER — ALLIED HEALTH/NURSE VISIT (OUTPATIENT)
Dept: NURSING | Facility: CLINIC | Age: 86
End: 2021-02-25
Payer: MEDICARE

## 2021-02-25 VITALS
BODY MASS INDEX: 29.26 KG/M2 | DIASTOLIC BLOOD PRESSURE: 75 MMHG | HEIGHT: 62 IN | WEIGHT: 159 LBS | OXYGEN SATURATION: 98 % | TEMPERATURE: 96.9 F | HEART RATE: 78 BPM | SYSTOLIC BLOOD PRESSURE: 133 MMHG

## 2021-02-25 DIAGNOSIS — Z01.30 BP CHECK: Primary | ICD-10-CM

## 2021-02-25 DIAGNOSIS — N18.30 STAGE 3 CHRONIC KIDNEY DISEASE, UNSPECIFIED WHETHER STAGE 3A OR 3B CKD (H): ICD-10-CM

## 2021-02-25 LAB
ANION GAP SERPL CALCULATED.3IONS-SCNC: 9 MMOL/L (ref 3–14)
BUN SERPL-MCNC: 27 MG/DL (ref 7–30)
CALCIUM SERPL-MCNC: 10.1 MG/DL (ref 8.5–10.1)
CHLORIDE SERPL-SCNC: 108 MMOL/L (ref 94–109)
CO2 SERPL-SCNC: 21 MMOL/L (ref 20–32)
CREAT SERPL-MCNC: 1.18 MG/DL (ref 0.52–1.04)
GFR SERPL CREATININE-BSD FRML MDRD: 39 ML/MIN/{1.73_M2}
GLUCOSE SERPL-MCNC: 148 MG/DL (ref 70–99)
POTASSIUM SERPL-SCNC: 4.1 MMOL/L (ref 3.4–5.3)
SODIUM SERPL-SCNC: 138 MMOL/L (ref 133–144)

## 2021-02-25 PROCEDURE — 36415 COLL VENOUS BLD VENIPUNCTURE: CPT | Performed by: INTERNAL MEDICINE

## 2021-02-25 PROCEDURE — 80048 BASIC METABOLIC PNL TOTAL CA: CPT | Performed by: INTERNAL MEDICINE

## 2021-02-25 PROCEDURE — 99207 PR NO CHARGE NURSE ONLY: CPT

## 2021-02-25 ASSESSMENT — MIFFLIN-ST. JEOR: SCORE: 1074.47

## 2021-02-25 NOTE — LETTER
February 26, 2021      Tanisha Muhammad  [address]   [place] [place] [address]        Dear ,    The following letter pertains to your most recent diagnostic tests:     The lab results are stable on the new blood pressure medication.  The blood pressure result was good.  Please keep taking the torsemide as you are.         Resulted Orders   **Basic metabolic panel FUTURE 14d   Result Value Ref Range    Sodium 138 133 - 144 mmol/L    Potassium 4.1 3.4 - 5.3 mmol/L    Chloride 108 94 - 109 mmol/L    Carbon Dioxide 21 20 - 32 mmol/L    Anion Gap 9 3 - 14 mmol/L    Glucose 148 (H) 70 - 99 mg/dL    Urea Nitrogen 27 7 - 30 mg/dL    Creatinine 1.18 (H) 0.52 - 1.04 mg/dL    GFR Estimate 39 (L) >60 mL/min/[1.73_m2]      Comment:      Non  GFR Calc  Starting 12/18/2018, serum creatinine based estimated GFR (eGFR) will be   calculated using the Chronic Kidney Disease Epidemiology Collaboration   (CKD-EPI) equation.      GFR Estimate If Black 46 (L) >60 mL/min/[1.73_m2]      Comment:       GFR Calc  Starting 12/18/2018, serum creatinine based estimated GFR (eGFR) will be   calculated using the Chronic Kidney Disease Epidemiology Collaboration   (CKD-EPI) equation.      Calcium 10.1 8.5 - 10.1 mg/dL       If you have any questions or concerns, please call the clinic at the number listed above.       Sincerely,      Sesar Cuevas MD    [facility]

## 2021-02-26 NOTE — RESULT ENCOUNTER NOTE
The following letter pertains to your most recent diagnostic tests:    The lab results are stable on the new blood pressure medication.  The blood pressure result was good.  Please keep taking the torsemide as you are.          Sincerely,    Dr. Cuevas

## 2021-03-02 ENCOUNTER — NURSE TRIAGE (OUTPATIENT)
Dept: NURSING | Facility: CLINIC | Age: 86
End: 2021-03-02

## 2021-03-02 NOTE — TELEPHONE ENCOUNTER
Jelly is calling today and states that her blood pressure is high currently 153/83.  Jelly states that 10pm last night blood pressure has been going up.  Jelly takes her blood pressure medication at noon each day.  Jelly denies chest pain and shortness of breath and denies weakness.      COVID 19 Nurse Triage Plan/Patient Instructions    Please be aware that novel coronavirus (COVID-19) may be circulating in the community. If you develop symptoms such as fever, cough, or SOB or if you have concerns about the presence of another infection including coronavirus (COVID-19), please contact your health care provider or visit [email or web address].     Disposition/Instructions    In-Person Visit with provider recommended. Reference Visit Selection Guide.    Thank you for taking steps to prevent the spread of this virus.  o Limit your contact with others.  o Wear a simple mask to cover your cough.  o Wash your hands well and often.    Resources    [facility]: About COVID-19: [email or web address]    CDC: What to Do If You're Sick: www.cdc.gov/coronavirus/2019-ncov/about/steps-when-sick.html    CDC: Ending Home Isolation: www.cdc.gov/coronavirus/2019-ncov/hcp/disposition-in-home-patients.html     CDC: Caring for Someone: www.cdc.gov/coronavirus/2019-ncov/if-you-are-sick/care-for-someone.html     [facility]: Interim Guidance for Hospital Discharge to Home: www.health.[facility].[place]/diseases/coronavirus/hcp/hospdischarge.pdf    [facility] clinical trials (COVID-19 research studies): [email or web address]-[email or web address]     Below are the COVID-19 hotlines at the [place] Department of Health ([facility]). Interpreters are available.   o For health questions: Call 154-577-3709 or 1-696.958.2921 (7 a.m. to 7 p.m.)  o For questions about schools and childcare: Call 642-880-6883 or 1-689.177.1089 (7 a.m. to 7 p.m.)                       Reason for Disposition    [1] Systolic BP  >= 130 OR Diastolic >= 80 AND  [2] taking BP medications    Additional Information    Negative: Difficult to awaken or acting confused (e.g., disoriented, slurred speech)    Negative: Severe difficulty breathing (e.g., struggling for each breath, speaks in single words)    Negative: [1] Weakness of the face, arm or leg on one side of the body AND [2] new onset    Negative: [1] Numbness (i.e., loss of sensation) of the face, arm or leg on one side of the body AND [2] new onset    Negative: [1] Chest pain lasts > 5 minutes AND [2] history of heart disease  (i.e., heart attack, bypass surgery, angina, angioplasty, CHF)    Negative: [1] Chest pain AND [2] took nitrogylcerin AND [3] pain was not relieved    Negative: Sounds like a life-threatening emergency to the triager    Negative: [1] Systolic BP  >= 160 OR Diastolic >= 100 AND [2] cardiac or neurologic symptoms (e.g., chest pain, difficulty breathing, unsteady gait, blurred vision)    Negative: [1] Pregnant > 20 weeks (or postpartum < 6 weeks) AND [2] new hand or face swelling    Negative: [1] Pregnant > 20 weeks AND [2] BP Systolic BP  >= 140 OR Diastolic >= 90    Negative: [1] Systolic BP  >= 200 OR Diastolic >= 120  AND [2] having NO cardiac or neurologic symptoms    Negative: [1] Postpartum < 6 weeks AND [2] BP Systolic BP  >= 140 OR Diastolic >= 90    Negative: [1] Systolic BP  >= 180 OR Diastolic >= 110 AND [2] missed most recent dose of blood pressure medication    Negative: Systolic BP  >= 180 OR Diastolic >= 110    Negative: Ran out of BP medications    Negative: Systolic BP  >= 160 OR Diastolic >= 100    Negative: [1] Taking BP medications AND [2] feels is having side effects (e.g., impotence, cough, dizzy upon standing)    Negative: [1] Systolic BP  >= 130 OR Diastolic >= 80 AND [2] pregnant    Protocols used: HIGH BLOOD PRESSURE-A-AH

## 2021-03-04 ENCOUNTER — OFFICE VISIT (OUTPATIENT)
Dept: FAMILY MEDICINE | Facility: CLINIC | Age: 86
End: 2021-03-04
Payer: MEDICARE

## 2021-03-04 ENCOUNTER — NURSE TRIAGE (OUTPATIENT)
Dept: NURSING | Facility: CLINIC | Age: 86
End: 2021-03-04

## 2021-03-04 VITALS
WEIGHT: 149 LBS | DIASTOLIC BLOOD PRESSURE: 76 MMHG | TEMPERATURE: 98 F | OXYGEN SATURATION: 99 % | HEIGHT: 62 IN | HEART RATE: 78 BPM | BODY MASS INDEX: 27.42 KG/M2 | SYSTOLIC BLOOD PRESSURE: 144 MMHG

## 2021-03-04 DIAGNOSIS — F41.9 ANXIETY: ICD-10-CM

## 2021-03-04 DIAGNOSIS — I10 BENIGN ESSENTIAL HYPERTENSION: Primary | ICD-10-CM

## 2021-03-04 PROCEDURE — 36415 COLL VENOUS BLD VENIPUNCTURE: CPT | Performed by: NURSE PRACTITIONER

## 2021-03-04 PROCEDURE — 80048 BASIC METABOLIC PNL TOTAL CA: CPT | Performed by: NURSE PRACTITIONER

## 2021-03-04 PROCEDURE — 99213 OFFICE O/P EST LOW 20 MIN: CPT | Performed by: NURSE PRACTITIONER

## 2021-03-04 ASSESSMENT — MIFFLIN-ST. JEOR: SCORE: 1029.24

## 2021-03-04 NOTE — PROGRESS NOTES
Assessment & Plan     Benign essential hypertension  - Basic metabolic panel  (Ca, Cl, CO2, Creat, Gluc, K, Na, BUN)    I believe that a good part of the elevation of blood pressure is due to anxiety.  She lives alone and is concerned about having a stroke and being alone in her home.  She does have a neighbor who has offered to be her lifeline and she will access her in the future.      I have encouraged her to put light on and get up for a small snack and a little TV/movie to distract herself when she wakes up worried and anxious about her BP.    Will also have RN call to ascertain exactly what and how she is taking her medication    ADDENDA:  ARVIND Lanier confirmed that Jelly is taking her antihypertensives correctly       Inessa Souza, CRYSTAL CNP  [facility]    Luci Reaves is a 89 year old who presents for the following health issues  Tanisha , who lives alone, awoke last night with a rushing sound in her ears.  She does not recall what her BP was but high enough that she called 911.  The medics arrived and were there about 10 minutes while her BP came down.    She has very detailed but not well organized documentation of the date and times that she takes her meds but has neglected to record the actual blood pressure.   On 2/11 her PCP changed meds:  Increase norvasc from 5 to 10 mg  And added torsemide 5 mg  Also taking losartan 100mg   Goal BP <150/90 and this is discussed with her today    Tanisha is not certain that she increased the amlodipine to 10mg and I am also concerned that she has bottles of 2 different doses at home    She is due to have BMP checked also   HPI       Hypertension Follow-up      Do you check your blood pressure regularly outside of the clinic? Yes     Are you following a low salt diet? Yes    Are your blood pressures ever more than 140 on the top number (systolic) OR more   than 90 on the bottom number (diastolic), for example 140/90? Yes      Review of Systems  "  Constitutional, HEENT, cardiovascular, pulmonary, gi and gu systems are negative, except as otherwise noted.    NEURO: no headache or blurred vision  CV: no SOB or chest pain   Objective    BP (!) 144/76 (BP Location: Left arm, Patient Position: Sitting)   Pulse 78   Temp 98  F (36.7  C) (Temporal)   Ht 1.575 m (5' 2.01\")   Wt 67.6 kg (149 lb)   SpO2 99%   BMI 27.25 kg/m    Body mass index is 27.25 kg/m .   Manual 152/70  Physical Exam   GENERAL: healthy, alert and no distress  EYES: Eyes grossly normal to inspection, PERRL and conjunctivae and sclerae normal  NECK: no adenopathy, no asymmetry, masses, or scars and thyroid normal to palpation  RESP: lungs clear to auscultation - no rales, rhonchi or wheezes  CV: regular rate and rhythm, normal S1 S2, no S3 or S4, no murmur, click or rub, no peripheral edema and peripheral pulses strong  MS: no gross musculoskeletal defects noted, no edema    Results for orders placed or performed in visit on 03/04/21   Basic metabolic panel  (Ca, Cl, CO2, Creat, Gluc, K, Na, BUN)     Status: Abnormal   Result Value Ref Range    Sodium 137 133 - 144 mmol/L    Potassium 4.7 3.4 - 5.3 mmol/L    Chloride 108 94 - 109 mmol/L    Carbon Dioxide 24 20 - 32 mmol/L    Anion Gap 5 3 - 14 mmol/L    Glucose 114 (H) 70 - 99 mg/dL    Urea Nitrogen 29 7 - 30 mg/dL    Creatinine 1.25 (H) 0.52 - 1.04 mg/dL    GFR Estimate 37 (L) >60 mL/min/[1.73_m2]    GFR Estimate If Black 43 (L) >60 mL/min/[1.73_m2]    Calcium 10.1 8.5 - 10.1 mg/dL           "

## 2021-03-04 NOTE — TELEPHONE ENCOUNTER
She called emergency last night. She has a 1 p.m. appointment today but would like to be seen sooner. Can she be seen sooner? Her most recent blood pressure was  150/84 right now.  They need about 40 minutes notice to get there. Please call them if she can get in any sooner. Please call:  [phone].  Thank you,  Ayanna Colon RN  [facility]    Additional Information    Negative: Nursing judgment    Nursing judgment    Protocols used: NO PROTOCOL AVAILABLE - INFORMATION ONLY-A-OH

## 2021-03-05 LAB
ANION GAP SERPL CALCULATED.3IONS-SCNC: 5 MMOL/L (ref 3–14)
BUN SERPL-MCNC: 29 MG/DL (ref 7–30)
CALCIUM SERPL-MCNC: 10.1 MG/DL (ref 8.5–10.1)
CHLORIDE SERPL-SCNC: 108 MMOL/L (ref 94–109)
CO2 SERPL-SCNC: 24 MMOL/L (ref 20–32)
CREAT SERPL-MCNC: 1.25 MG/DL (ref 0.52–1.04)
GFR SERPL CREATININE-BSD FRML MDRD: 37 ML/MIN/{1.73_M2}
GLUCOSE SERPL-MCNC: 114 MG/DL (ref 70–99)
POTASSIUM SERPL-SCNC: 4.7 MMOL/L (ref 3.4–5.3)
SODIUM SERPL-SCNC: 137 MMOL/L (ref 133–144)

## 2021-03-09 ENCOUNTER — TELEPHONE (OUTPATIENT)
Dept: FAMILY MEDICINE | Facility: CLINIC | Age: 86
End: 2021-03-09

## 2021-03-09 NOTE — TELEPHONE ENCOUNTER
----- Message from CRYSTAL Cool CNP sent at 3/5/2021 12:14 PM CST -----  I saw Jelly yesterday for her concerns about BP.  She was not fully able to tell me the doses of her medications .  Had had 2 recent changes 1. Torsemide had been added- I'm pretty sure that she got that one right  2. The amlodipine had been increased from 5mg to 10mg and she had not realized that  She also continued losartan 100mg    Would you please call her and review her meds with her?  The amlodipine in particular.  I 'm afraid she may not have increased it or that she is actually taking both a 5mg and a 10mg     She is expecting a call.   I think.

## 2021-03-10 NOTE — TELEPHONE ENCOUNTER
"Phone is busy. Will recall.  Amanda Lanier RN on 3/10/2021 at 12:19 PM    Spoke to patient. There is another \"person\" in the room and confirming her meds with her. She read this to me while reading her pill bottles:    10 mg Amlodipine  100 mg Losartan  5 mg Torsemide    She is NOT taking additional Amlodipine.   Amanda Lanier RN on 3/10/2021 at 12:44 PM    "

## 2021-03-11 ENCOUNTER — OFFICE VISIT (OUTPATIENT)
Dept: FAMILY MEDICINE | Facility: CLINIC | Age: 86
End: 2021-03-11
Payer: MEDICARE

## 2021-03-11 ENCOUNTER — NURSE TRIAGE (OUTPATIENT)
Dept: FAMILY MEDICINE | Facility: CLINIC | Age: 86
End: 2021-03-11

## 2021-03-11 VITALS
TEMPERATURE: 98.2 F | DIASTOLIC BLOOD PRESSURE: 69 MMHG | HEIGHT: 62 IN | BODY MASS INDEX: 29.44 KG/M2 | OXYGEN SATURATION: 98 % | WEIGHT: 160 LBS | HEART RATE: 88 BPM | SYSTOLIC BLOOD PRESSURE: 169 MMHG

## 2021-03-11 DIAGNOSIS — F41.9 ANXIETY: ICD-10-CM

## 2021-03-11 DIAGNOSIS — F41.9 ANXIETY: Primary | ICD-10-CM

## 2021-03-11 DIAGNOSIS — I10 BENIGN ESSENTIAL HYPERTENSION: Primary | ICD-10-CM

## 2021-03-11 PROCEDURE — 99214 OFFICE O/P EST MOD 30 MIN: CPT | Performed by: PHYSICIAN ASSISTANT

## 2021-03-11 RX ORDER — ESCITALOPRAM OXALATE 5 MG/1
5 TABLET ORAL DAILY
Qty: 30 TABLET | Refills: 1 | Status: ON HOLD | OUTPATIENT
Start: 2021-03-11 | End: 2021-03-31

## 2021-03-11 ASSESSMENT — MIFFLIN-ST. JEOR: SCORE: 1079.01

## 2021-03-11 NOTE — PROGRESS NOTES
HPI: Jelly is a 89 yo female here with concerns about her blood pressure  Pt lives alone and ever since her dtr  2 years ago she has developed more anxiety  She is tearful discussing her dtr today  age 67 following complications after a fall  She notes her BP spikes and she gets more nervous; jaz in the middle of the night  She is irritated today that she could not see her PCP (Dr. Cuevas)  Lives independently in [facility]  Has a good neighbor who is helpful but pt doesn't like to bother her when she is up in the middle of night feeling anxious and checking her blood pressure.   She has a nephew who lives locally and also checks on her.  Her mother also had labile HTN  She tries to make herself relax when she has this anxiety  She had a good friend pass away yesterday which is increasing her anxiety  Pt was in ED in both  and Feb of this year for HTN  She saw Inessa Souza for same issue on 3/4/21.  Inessa notes she feels anxiety driving up her BP  Pt is compliant with taking her medications and writes down the time that she takes them each day    Past Medical History:   Diagnosis Date     Cystocele 2010    midline     Elevated glucose      Gout 2010     [place]'s disease      Inactive Ménière's disease     Left ear deafness     Post-menopausal bleeding 2014    possibly due to pessary, area of irritated skin visualized on exam with active light bleeding posterior to cervix. Will need bx if continues following estrogen cream and leaving pessary out for 3-4 weeks.      Rectocele      Trigeminal neuralgia      Unspecified essential hypertension      Unspecified venous (peripheral) insufficiency      Past Surgical History:   Procedure Laterality Date     C DENTAL CONSULTATION      Dr Kahlil CEJA EYE EXAM, EST PATIENT,COMPREHESV      DR white     CATARACT IOL, RT/LT      Left     LASER YAG CAPSULOTOMY Left 10/2/2015    Procedure: LASER YAG CAPSULOTOMY;  Surgeon: Pieter Rios MD;  Location:  "[facility]     Social History     Tobacco Use     Smoking status: Never Smoker     Smokeless tobacco: Never Used   Substance Use Topics     Alcohol use: Yes     Alcohol/week: 0.0 standard drinks     Comment: one glass wine a month     Current Outpatient Medications   Medication Sig Dispense Refill     amLODIPine (NORVASC) 10 MG tablet Take 1 tablet (10 mg) by mouth daily 90 tablet 3     Cyanocobalamin (VITAMIN B-12 PO) Take 1 tablet by mouth daily        estradiol (ESTRACE VAGINAL) 0.1 MG/GM vaginal cream Place 1 g vaginally twice a week Place 1g in vagina twice weekly 42.5 g 3     losartan (COZAAR) 100 MG tablet TAKE 1 TABLET(100 MG) BY MOUTH DAILY 90 tablet 3     torsemide (DEMADEX) 5 MG tablet Take 1 tablet (5 mg) by mouth daily Take in AM 90 tablet 3     escitalopram (LEXAPRO) 5 MG tablet Take 1 tablet (5 mg) by mouth daily 30 tablet 1     Allergies   Allergen Reactions     Indomethacin      Cognitive impairment     Keflex [Cephalexin]      HIVES     Sulfamethoxazole-Trimethoprim      BACTRIM      FAMILY HISTORY NOTED AND REVIEWED    PHYSICAL EXAM:    BP (!) 169/69 (BP Location: Right arm, Patient Position: Chair, Cuff Size: Adult Regular)   Pulse 88   Temp 98.2  F (36.8  C) (Temporal)   Ht 1.575 m (5' 2\")   Wt 72.6 kg (160 lb)   SpO2 98%   BMI 29.26 kg/m      Patient is a bit [place]  After chatting with the pt I did recheck her blood pressure manually and it did come down to 143/68  Heart: RRR without m/r/g.  Lungs: CTA bilat  Psych: pt anxious, tearful, poor eye contact    Assessment and Plan:     (I10) Benign essential hypertension  (primary encounter diagnosis)  Comment: pt has labile HTN and I do feel her anxiety drives this up.  Pt lives alone and feeling overwhelmed since the passing of her dtr 2 years ago. She is compliant with her medications  Plan: recd she check her BP less often as this is increasing her anxiety.  I arranged for pt to see her PCP next week as she was irritated today that she is \"never " "able to see her own doctor\".  I did not adjust her blood pressure medication as her blood pressure came down on recheck once she relaxed a bit.  Reviewed deep breathing exercises she could try when feeling particularly anxious.  Discussed whether she might feel more comfortable in an assisting living facility where she has more help.  Pt states she has thought about that but really doesn't want to move for now.    (F41.9) Anxiety  Comment:   Plan: I did call the pt after she returned home as she wanted to see what medication she was allergic to before I sent in a new medication for anxiety. It was bactrim. Pt reassured this an antibiotic.  I did send in a prescription for lexapro 5mg every day. I explained to pt this may take 4-6 weeks to take full effect and that some initial side effects if she has them, may resolve after a few weeks. I did call her friend Storm and explained the plan as well. Storm feels pt may need a guardian and is very overwhelmed with taking care of her own and  dtrs affairs.    Spent 30 minutes FTF with patient of which over 50% was spent discussing the coordination of care and management of their HTN and anxiety.        Cailin Solis PA-C        "

## 2021-03-11 NOTE — PATIENT INSTRUCTIONS
Try the breathing exercises when you feel anxious.  Take a deep breath in for 4 seconds and let it out for 4 seconds.  Repeat 3 times.  Check the blood pressure less often.    We will call to see what medication you CANNOT take

## 2021-03-11 NOTE — TELEPHONE ENCOUNTER
I called pt  Does not need to go to ER  I just saw her in the clinic and she is extremely anxious  BP comes down once she relaxes    I called pt as she wanted to see what she was allergic to before I put her on medication for her anxiety.  It is bactrim that she is allergic to.  I am recommending lexapro and will send to pharmacy.    Pt gave me permission to call Storm.  I did call her.  Storm says pt has papers all of the house and is forgetful and feels she needs a guardian.    Discussed trying to get pt's anxiety under control as I feels this is driving some of her high BP readings.    She was upset that couldn't see her own doctor today so did arrange appt for next week with PCP.

## 2021-03-11 NOTE — TELEPHONE ENCOUNTER
Patients friend Storm reports pt has elevated BP readings today and is requesting to be seen at ED or UC as she is concerned of having a stoke. BP was checked by friend Storm before and during call.  BP readings reported: 194/96, 186/83 and 160/83 pulse 80. Caller asked what BP reading is acceptable and when should pt seek immediate care at UC and ED. Stroke and cardiac symptoms reviewed. Pt denies any symptoms at this time other than anxiety. Pt was seen today at clinic. AVS notes were reviewed with pt and caller. Asked that pt be seen at Ed if any corey signs discussed.  Sending message to provider for review. Please advice if any further directives at this time.     FYI- Pt has future appt 03/19/2020 with PCP. Pt denies any missed BP medication doses.     Additional Information    Negative: Sounds like a life-threatening emergency to the triager    Negative: Pregnant > 20 weeks or postpartum (< 6 weeks after delivery) and new hand or face swelling    Negative: Pregnant > 20 weeks and BP > 140/90    Negative: Systolic BP >= 160 OR Diastolic >= 100, and any cardiac or neurologic symptoms (e.g., chest pain, difficulty breathing, unsteady gait, blurred vision)    Negative: Patient sounds very sick or weak to the triager    Negative: BP Systolic BP >= 140 OR Diastolic >= 90 and postpartum (from 0 to 6 weeks after delivery)    Negative: Systolic BP >= 180 OR Diastolic >= 110, and missed most recent dose of blood pressure medication    Negative: Systolic BP >= 180 OR Diastolic >= 110    Patient wants to be seen    Protocols used: HIGH BLOOD PRESSURE-A-OH

## 2021-03-18 NOTE — TELEPHONE ENCOUNTER
"Patient called this a.m sounding extremely anxious. \"I had the paramedics here at midnight. My neighbor came over because my blood pressure was lorraine high\".  (she cannot locate the BP reading.)  \"I just know it was high and they said I need to follow up with my doctor. I have an appointment tomorrow.    Reviewed meds.  She has NOT started Escitalopram 5 mg, which was prescribed by Cailin Solis PA-C, one week ago. States she thought that was just \"as needed\".  She will start today and take every morning.    After 10 minute phone call, she took her blood pressure:  148/82   Pulse 72.  She feels better after talking to nurse.    Keep appt.tomorrow. take BP meds as prescribed. (states she takes in a.m/staggers them by an hour or 2).      Call with any worsening or concerns.  Amanda Lanier RN on 3/18/2021 at 7:42 AM    "

## 2021-03-19 ENCOUNTER — TELEPHONE (OUTPATIENT)
Dept: FAMILY MEDICINE | Facility: CLINIC | Age: 86
End: 2021-03-19

## 2021-03-19 NOTE — TELEPHONE ENCOUNTER
Informed pt and scheduled for a phone visit in 1 month  Casie Self, RN  [facility] RN Triage Team

## 2021-03-19 NOTE — TELEPHONE ENCOUNTER
Our goal is blood pressure less than 150/90, so I think today's reading is right on target.  We can discuss the lexapro at our upcoming phone visit

## 2021-03-19 NOTE — TELEPHONE ENCOUNTER
"Pt came for appt and was 20 min late.  Triaged per request of Dr. Cuevas and pt requested just a BP check, not much changed since last week with Cailin.    Declined to start lexapro, as feels anxiety was not causing elevated blood pressure.  Pt states \"I think it would be normal to be anxious if you wake up with chest pain issues in the middle of the night.\"      Checked BP/Pulse Resting at this visit: 145/70 69, which seems overall improved from recent hx.  Pt checks BP at home as well.    Advised to go home, and will follow up to schedule a phone visit when appropriate.   BP Readings from Last 6 Encounters:   03/11/21 (!) 169/69   03/04/21 (!) 144/76   02/25/21 133/75   02/04/21 (!) 141/68   01/25/21 (!) 208/98   01/21/21 (!) 183/81     Plan for review:  Phone visit in 1 month, check BP resting 2x a week, and if any new/worsening concerns call sooner?   "

## 2021-03-26 ENCOUNTER — HOSPITAL ENCOUNTER (INPATIENT)
Facility: CLINIC | Age: 86
LOS: 5 days | Discharge: HOME | DRG: 641 | End: 2021-03-31
Attending: EMERGENCY MEDICINE | Admitting: HOSPITALIST
Payer: MEDICARE

## 2021-03-26 ENCOUNTER — TELEPHONE (OUTPATIENT)
Dept: FAMILY MEDICINE | Facility: CLINIC | Age: 86
End: 2021-03-26

## 2021-03-26 DIAGNOSIS — E87.1 HYPONATREMIA: ICD-10-CM

## 2021-03-26 LAB
ALBUMIN UR-MCNC: 20 MG/DL
ANION GAP SERPL CALCULATED.3IONS-SCNC: 8 MMOL/L (ref 3–14)
APPEARANCE UR: CLEAR
BACTERIA #/AREA URNS HPF: ABNORMAL /HPF
BASOPHILS # BLD AUTO: 0 10E9/L (ref 0–0.2)
BASOPHILS NFR BLD AUTO: 0.3 %
BILIRUB UR QL STRIP: NEGATIVE
BUN SERPL-MCNC: 13 MG/DL (ref 7–30)
CALCIUM SERPL-MCNC: 9.1 MG/DL (ref 8.5–10.1)
CHLORIDE SERPL-SCNC: 83 MMOL/L (ref 94–109)
CO2 SERPL-SCNC: 23 MMOL/L (ref 20–32)
COLOR UR AUTO: ABNORMAL
CREAT SERPL-MCNC: 0.94 MG/DL (ref 0.52–1.04)
CREAT UR-MCNC: 11 MG/DL
DIFFERENTIAL METHOD BLD: ABNORMAL
EOSINOPHIL # BLD AUTO: 0 10E9/L (ref 0–0.7)
EOSINOPHIL NFR BLD AUTO: 0.3 %
ERYTHROCYTE [DISTWIDTH] IN BLOOD BY AUTOMATED COUNT: 12.4 % (ref 10–15)
FRACT EXCRET NA UR+SERPL-RTO: 2.2 %
GFR SERPL CREATININE-BSD FRML MDRD: 52 ML/MIN/{1.73_M2}
GLUCOSE SERPL-MCNC: 120 MG/DL (ref 70–99)
GLUCOSE UR STRIP-MCNC: NEGATIVE MG/DL
HCT VFR BLD AUTO: 32.2 % (ref 35–47)
HGB BLD-MCNC: 11.9 G/DL (ref 11.7–15.7)
HGB UR QL STRIP: NEGATIVE
IMM GRANULOCYTES # BLD: 0 10E9/L (ref 0–0.4)
IMM GRANULOCYTES NFR BLD: 0.7 %
INTERPRETATION ECG - MUSE: NORMAL
KETONES UR STRIP-MCNC: NEGATIVE MG/DL
LEUKOCYTE ESTERASE UR QL STRIP: NEGATIVE
LYMPHOCYTES # BLD AUTO: 0.9 10E9/L (ref 0.8–5.3)
LYMPHOCYTES NFR BLD AUTO: 14.4 %
MCH RBC QN AUTO: 28.7 PG (ref 26.5–33)
MCHC RBC AUTO-ENTMCNC: 37 G/DL (ref 31.5–36.5)
MCV RBC AUTO: 78 FL (ref 78–100)
MONOCYTES # BLD AUTO: 0.7 10E9/L (ref 0–1.3)
MONOCYTES NFR BLD AUTO: 11.9 %
MUCOUS THREADS #/AREA URNS LPF: PRESENT /LPF
NEUTROPHILS # BLD AUTO: 4.3 10E9/L (ref 1.6–8.3)
NEUTROPHILS NFR BLD AUTO: 72.4 %
NITRATE UR QL: NEGATIVE
NRBC # BLD AUTO: 0 10*3/UL
NRBC BLD AUTO-RTO: 0 /100
OSMOLALITY SERPL: 240 MMOL/KG (ref 280–301)
OSMOLALITY UR: 104 MMOL/KG (ref 100–1200)
OVALOCYTES BLD QL SMEAR: ABNORMAL
PH UR STRIP: 7 PH (ref 5–7)
PLATELET # BLD AUTO: 137 10E9/L (ref 150–450)
PLATELET # BLD EST: ABNORMAL 10*3/UL
POTASSIUM SERPL-SCNC: 4 MMOL/L (ref 3.4–5.3)
RBC # BLD AUTO: 4.14 10E12/L (ref 3.8–5.2)
RBC #/AREA URNS AUTO: 1 /HPF (ref 0–2)
SODIUM SERPL-SCNC: 114 MMOL/L (ref 133–144)
SODIUM UR-SCNC: 30 MMOL/L
SOURCE: ABNORMAL
SP GR UR STRIP: 1 (ref 1–1.03)
SQUAMOUS #/AREA URNS AUTO: <1 /HPF (ref 0–1)
TROPONIN I SERPL-MCNC: <0.015 UG/L (ref 0–0.04)
UROBILINOGEN UR STRIP-MCNC: 0 MG/DL (ref 0–2)
WBC # BLD AUTO: 5.9 10E9/L (ref 4–11)
WBC #/AREA URNS AUTO: <1 /HPF (ref 0–5)

## 2021-03-26 PROCEDURE — 82570 ASSAY OF URINE CREATININE: CPT | Performed by: EMERGENCY MEDICINE

## 2021-03-26 PROCEDURE — 83930 ASSAY OF BLOOD OSMOLALITY: CPT | Performed by: EMERGENCY MEDICINE

## 2021-03-26 PROCEDURE — U0003 INFECTIOUS AGENT DETECTION BY NUCLEIC ACID (DNA OR RNA); SEVERE ACUTE RESPIRATORY SYNDROME CORONAVIRUS 2 (SARS-COV-2) (CORONAVIRUS DISEASE [COVID-19]), AMPLIFIED PROBE TECHNIQUE, MAKING USE OF HIGH THROUGHPUT TECHNOLOGIES AS DESCRIBED BY CMS-2020-01-R: HCPCS | Performed by: EMERGENCY MEDICINE

## 2021-03-26 PROCEDURE — 93005 ELECTROCARDIOGRAM TRACING: CPT | Mod: 76

## 2021-03-26 PROCEDURE — 99223 1ST HOSP IP/OBS HIGH 75: CPT | Mod: AI | Performed by: HOSPITALIST

## 2021-03-26 PROCEDURE — 84300 ASSAY OF URINE SODIUM: CPT | Performed by: EMERGENCY MEDICINE

## 2021-03-26 PROCEDURE — 83935 ASSAY OF URINE OSMOLALITY: CPT | Performed by: EMERGENCY MEDICINE

## 2021-03-26 PROCEDURE — C9803 HOPD COVID-19 SPEC COLLECT: HCPCS

## 2021-03-26 PROCEDURE — U0005 INFEC AGEN DETEC AMPLI PROBE: HCPCS | Performed by: EMERGENCY MEDICINE

## 2021-03-26 PROCEDURE — 250N000011 HC RX IP 250 OP 636: Performed by: EMERGENCY MEDICINE

## 2021-03-26 PROCEDURE — 80048 BASIC METABOLIC PNL TOTAL CA: CPT | Performed by: EMERGENCY MEDICINE

## 2021-03-26 PROCEDURE — 96361 HYDRATE IV INFUSION ADD-ON: CPT

## 2021-03-26 PROCEDURE — 84484 ASSAY OF TROPONIN QUANT: CPT | Performed by: EMERGENCY MEDICINE

## 2021-03-26 PROCEDURE — 85025 COMPLETE CBC W/AUTO DIFF WBC: CPT | Performed by: EMERGENCY MEDICINE

## 2021-03-26 PROCEDURE — 99285 EMERGENCY DEPT VISIT HI MDM: CPT | Mod: 25

## 2021-03-26 PROCEDURE — 258N000003 HC RX IP 258 OP 636: Performed by: EMERGENCY MEDICINE

## 2021-03-26 PROCEDURE — 120N000001 HC R&B MED SURG/OB

## 2021-03-26 PROCEDURE — 96374 THER/PROPH/DIAG INJ IV PUSH: CPT

## 2021-03-26 PROCEDURE — 93005 ELECTROCARDIOGRAM TRACING: CPT

## 2021-03-26 PROCEDURE — 81001 URINALYSIS AUTO W/SCOPE: CPT | Performed by: EMERGENCY MEDICINE

## 2021-03-26 PROCEDURE — 250N000011 HC RX IP 250 OP 636

## 2021-03-26 RX ORDER — ONDANSETRON 4 MG/1
4 TABLET, ORALLY DISINTEGRATING ORAL
Status: COMPLETED | OUTPATIENT
Start: 2021-03-26 | End: 2021-03-26

## 2021-03-26 RX ORDER — ONDANSETRON 2 MG/ML
4 INJECTION INTRAMUSCULAR; INTRAVENOUS ONCE
Status: COMPLETED | OUTPATIENT
Start: 2021-03-26 | End: 2021-03-26

## 2021-03-26 RX ORDER — ONDANSETRON 2 MG/ML
INJECTION INTRAMUSCULAR; INTRAVENOUS
Status: COMPLETED
Start: 2021-03-26 | End: 2021-03-26

## 2021-03-26 RX ORDER — MULTIVITAMIN,THERAPEUTIC
1 TABLET ORAL DAILY
COMMUNITY

## 2021-03-26 RX ADMIN — ONDANSETRON 4 MG: 2 INJECTION INTRAMUSCULAR; INTRAVENOUS at 20:43

## 2021-03-26 RX ADMIN — ONDANSETRON 4 MG: 4 TABLET, ORALLY DISINTEGRATING ORAL at 18:12

## 2021-03-26 RX ADMIN — SODIUM CHLORIDE 1000 ML: 9 INJECTION, SOLUTION INTRAVENOUS at 20:43

## 2021-03-26 ASSESSMENT — ENCOUNTER SYMPTOMS
DIZZINESS: 0
FREQUENCY: 1
ABDOMINAL PAIN: 0
SHORTNESS OF BREATH: 0
NAUSEA: 1
DYSURIA: 0
HEADACHES: 1

## 2021-03-26 ASSESSMENT — MIFFLIN-ST. JEOR: SCORE: 1080.14

## 2021-03-26 NOTE — ED TRIAGE NOTES
"Pt reports feeling palpitations \"like my heart is going crazy\" and nausea since last evening. Denies chest pain, denies heart history.   "

## 2021-03-26 NOTE — TELEPHONE ENCOUNTER
I am not really sure how to help the situation today.  I am happy to see her in the clinic.  A virtual visit time slot can be used for a face-to-face visit if that is helpful.  Also, I can ask care coordination to call and see if the patient needs more support within her home.  As previously stated, I do not think that her blood pressure is really an acute issue although I do think it is an anxiety provoking issue for this patient.  Her goal blood pressure is at less than 150/90 and her blood pressure recheck seems to be pretty close to goal.  A face-to-face visit would be necessary to evaluate swelling symptoms that are new.  Can we help her get scheduled for a face-to-face visit.  Does she want a care coordinator to reach out to her to see if she needs more help in her home?

## 2021-03-26 NOTE — TELEPHONE ENCOUNTER
Called patient and she handed the phone to Soraya her friend     Next 5 appointments (look out 90 days)    Mar 29, 2021  4:00 PM  Office Visit with Sesar Cuevas MD  [facility] ([facility] ) [address] [facility] [address]  [phone]        151/78 P 91    Scheduled with PCP for Monday     Is very anxious - feels some nausea, dicussed trying crackers/gingerale     Advised call back in meantime if any new/worsening sx     Beth PRESSLEY RN

## 2021-03-26 NOTE — TELEPHONE ENCOUNTER
TO PCP:     Call from Mrs. Rao (rents from patient, lives in her complex) - Jelly called her and complained of feeling that her BP was high     Mrs Rao checked her BP: /86 P 86  Recheck:   /78 P 91     Pt states she already took her BP medications      Reviewed pill bottles with pt - amlodipine 10mg, Losartan 100mg, torsemide 5mg     Another neighbor slept over and she's needing lots of help lately from neighbors. Over past few weeks noticing with her Memory - more confused - ongoing for a while (recently saw Guille, notes state pt was irritated she could not see PCP instead/visit notes indicate lots of anxiety)     Pt doesn't really have any family     Both feet are swollen - elevated on pillow, helped some   Has compression stockings - has not been wearing, discussed using these, elevating legs, limiting sodium     Ate a little, sipping on water    159.7 lb - unchanged from last OV     [phone] - contact is friend Soraya Ruth - pt okay with us talking to her - good friend of patient     Pt does have upcoming phone visit, scheduled first avail OV with PCP     Pt does drive herself, but neighbor is concerned about that and her memory - she said over the past few weeks pt seems different and wanted to make sure PCP is aware     Neighbor is asking if there is anything they should do regarding memory concerns prior to upcoming visit?     Next 5 appointments (look out 90 days)    Apr 16, 2021  2:30 PM  Office Visit with Sesar Cuevas MD  [facility] ([facility] ) [address] [facility] [id number] [phone]        Call back number for pt: [phone]  Ms Rao: [phone]    Beth PRESSLEY RN

## 2021-03-27 LAB
ANION GAP SERPL CALCULATED.3IONS-SCNC: 3 MMOL/L (ref 3–14)
BASOPHILS # BLD AUTO: 0 10E9/L (ref 0–0.2)
BASOPHILS NFR BLD AUTO: 0.2 %
BUN SERPL-MCNC: 11 MG/DL (ref 7–30)
CALCIUM SERPL-MCNC: 8.8 MG/DL (ref 8.5–10.1)
CHLORIDE SERPL-SCNC: 94 MMOL/L (ref 94–109)
CO2 SERPL-SCNC: 25 MMOL/L (ref 20–32)
CREAT SERPL-MCNC: 1.03 MG/DL (ref 0.52–1.04)
DIFFERENTIAL METHOD BLD: ABNORMAL
EOSINOPHIL # BLD AUTO: 0 10E9/L (ref 0–0.7)
EOSINOPHIL NFR BLD AUTO: 0.4 %
ERYTHROCYTE [DISTWIDTH] IN BLOOD BY AUTOMATED COUNT: 12.6 % (ref 10–15)
GFR SERPL CREATININE-BSD FRML MDRD: 46 ML/MIN/{1.73_M2}
GLUCOSE SERPL-MCNC: 121 MG/DL (ref 70–99)
HCT VFR BLD AUTO: 28.6 % (ref 35–47)
HGB BLD-MCNC: 10.7 G/DL (ref 11.7–15.7)
IMM GRANULOCYTES # BLD: 0 10E9/L (ref 0–0.4)
IMM GRANULOCYTES NFR BLD: 0.7 %
INTERPRETATION ECG - MUSE: NORMAL
LABORATORY COMMENT REPORT: NORMAL
LYMPHOCYTES # BLD AUTO: 0.8 10E9/L (ref 0.8–5.3)
LYMPHOCYTES NFR BLD AUTO: 16.7 %
MAGNESIUM SERPL-MCNC: 1.8 MG/DL (ref 1.6–2.3)
MCH RBC QN AUTO: 29.3 PG (ref 26.5–33)
MCHC RBC AUTO-ENTMCNC: 37.4 G/DL (ref 31.5–36.5)
MCV RBC AUTO: 78 FL (ref 78–100)
MONOCYTES # BLD AUTO: 0.6 10E9/L (ref 0–1.3)
MONOCYTES NFR BLD AUTO: 13 %
NEUTROPHILS # BLD AUTO: 3.2 10E9/L (ref 1.6–8.3)
NEUTROPHILS NFR BLD AUTO: 69 %
NRBC # BLD AUTO: 0 10*3/UL
NRBC BLD AUTO-RTO: 0 /100
PHOSPHATE SERPL-MCNC: 2.5 MG/DL (ref 2.5–4.5)
PLATELET # BLD AUTO: 117 10E9/L (ref 150–450)
POTASSIUM SERPL-SCNC: 3.9 MMOL/L (ref 3.4–5.3)
POTASSIUM SERPL-SCNC: 4.2 MMOL/L (ref 3.4–5.3)
RBC # BLD AUTO: 3.65 10E12/L (ref 3.8–5.2)
SARS-COV-2 RNA RESP QL NAA+PROBE: NEGATIVE
SODIUM SERPL-SCNC: 119 MMOL/L (ref 133–144)
SODIUM SERPL-SCNC: 120 MMOL/L (ref 133–144)
SODIUM SERPL-SCNC: 122 MMOL/L (ref 133–144)
SODIUM SERPL-SCNC: 122 MMOL/L (ref 133–144)
SPECIMEN SOURCE: NORMAL
TSH SERPL DL<=0.005 MIU/L-ACNC: 1.73 MU/L (ref 0.4–4)
WBC # BLD AUTO: 4.6 10E9/L (ref 4–11)

## 2021-03-27 PROCEDURE — 83735 ASSAY OF MAGNESIUM: CPT | Performed by: HOSPITALIST

## 2021-03-27 PROCEDURE — 258N000003 HC RX IP 258 OP 636: Performed by: HOSPITALIST

## 2021-03-27 PROCEDURE — 84100 ASSAY OF PHOSPHORUS: CPT | Performed by: HOSPITALIST

## 2021-03-27 PROCEDURE — 36415 COLL VENOUS BLD VENIPUNCTURE: CPT | Performed by: HOSPITALIST

## 2021-03-27 PROCEDURE — 84132 ASSAY OF SERUM POTASSIUM: CPT | Performed by: HOSPITALIST

## 2021-03-27 PROCEDURE — 250N000013 HC RX MED GY IP 250 OP 250 PS 637: Performed by: HOSPITALIST

## 2021-03-27 PROCEDURE — 84443 ASSAY THYROID STIM HORMONE: CPT | Performed by: HOSPITALIST

## 2021-03-27 PROCEDURE — 120N000001 HC R&B MED SURG/OB

## 2021-03-27 PROCEDURE — 99232 SBSQ HOSP IP/OBS MODERATE 35: CPT | Performed by: HOSPITALIST

## 2021-03-27 PROCEDURE — 85025 COMPLETE CBC W/AUTO DIFF WBC: CPT | Performed by: HOSPITALIST

## 2021-03-27 PROCEDURE — 80048 BASIC METABOLIC PNL TOTAL CA: CPT | Performed by: HOSPITALIST

## 2021-03-27 PROCEDURE — 84295 ASSAY OF SERUM SODIUM: CPT | Performed by: HOSPITALIST

## 2021-03-27 RX ORDER — AMOXICILLIN 250 MG
2 CAPSULE ORAL 2 TIMES DAILY PRN
Status: DISCONTINUED | OUTPATIENT
Start: 2021-03-27 | End: 2021-03-31 | Stop reason: HOSPADM

## 2021-03-27 RX ORDER — BISACODYL 10 MG
10 SUPPOSITORY, RECTAL RECTAL DAILY PRN
Status: DISCONTINUED | OUTPATIENT
Start: 2021-03-27 | End: 2021-03-31 | Stop reason: HOSPADM

## 2021-03-27 RX ORDER — LANOLIN ALCOHOL/MO/W.PET/CERES
3 CREAM (GRAM) TOPICAL
Status: DISCONTINUED | OUTPATIENT
Start: 2021-03-27 | End: 2021-03-31 | Stop reason: HOSPADM

## 2021-03-27 RX ORDER — ONDANSETRON 2 MG/ML
4 INJECTION INTRAMUSCULAR; INTRAVENOUS EVERY 6 HOURS PRN
Status: DISCONTINUED | OUTPATIENT
Start: 2021-03-27 | End: 2021-03-31 | Stop reason: HOSPADM

## 2021-03-27 RX ORDER — ACETAMINOPHEN 650 MG/1
650 SUPPOSITORY RECTAL EVERY 4 HOURS PRN
Status: DISCONTINUED | OUTPATIENT
Start: 2021-03-27 | End: 2021-03-31 | Stop reason: HOSPADM

## 2021-03-27 RX ORDER — AMOXICILLIN 250 MG
1 CAPSULE ORAL 2 TIMES DAILY PRN
Status: DISCONTINUED | OUTPATIENT
Start: 2021-03-27 | End: 2021-03-31 | Stop reason: HOSPADM

## 2021-03-27 RX ORDER — AMLODIPINE BESYLATE 10 MG/1
10 TABLET ORAL DAILY
Status: DISCONTINUED | OUTPATIENT
Start: 2021-03-27 | End: 2021-03-31 | Stop reason: HOSPADM

## 2021-03-27 RX ORDER — ONDANSETRON 4 MG/1
4 TABLET, ORALLY DISINTEGRATING ORAL EVERY 6 HOURS PRN
Status: DISCONTINUED | OUTPATIENT
Start: 2021-03-27 | End: 2021-03-31 | Stop reason: HOSPADM

## 2021-03-27 RX ORDER — LIDOCAINE 40 MG/G
CREAM TOPICAL
Status: DISCONTINUED | OUTPATIENT
Start: 2021-03-27 | End: 2021-03-31 | Stop reason: HOSPADM

## 2021-03-27 RX ORDER — ESCITALOPRAM OXALATE 5 MG/1
5 TABLET ORAL DAILY
Status: DISCONTINUED | OUTPATIENT
Start: 2021-03-27 | End: 2021-03-27

## 2021-03-27 RX ORDER — POLYETHYLENE GLYCOL 3350 17 G/17G
17 POWDER, FOR SOLUTION ORAL DAILY PRN
Status: DISCONTINUED | OUTPATIENT
Start: 2021-03-27 | End: 2021-03-31 | Stop reason: HOSPADM

## 2021-03-27 RX ORDER — ACETAMINOPHEN 325 MG/1
650 TABLET ORAL EVERY 4 HOURS PRN
Status: DISCONTINUED | OUTPATIENT
Start: 2021-03-27 | End: 2021-03-31 | Stop reason: HOSPADM

## 2021-03-27 RX ORDER — DEXTROSE MONOHYDRATE 50 MG/ML
INJECTION, SOLUTION INTRAVENOUS CONTINUOUS
Status: ACTIVE | OUTPATIENT
Start: 2021-03-27 | End: 2021-03-28

## 2021-03-27 RX ADMIN — ACETAMINOPHEN 325 MG: 325 TABLET, FILM COATED ORAL at 03:16

## 2021-03-27 RX ADMIN — DEXTROSE MONOHYDRATE: 50 INJECTION, SOLUTION INTRAVENOUS at 11:43

## 2021-03-27 RX ADMIN — DEXTROSE MONOHYDRATE: 50 INJECTION, SOLUTION INTRAVENOUS at 03:46

## 2021-03-27 RX ADMIN — AMLODIPINE BESYLATE 10 MG: 10 TABLET ORAL at 11:37

## 2021-03-27 ASSESSMENT — ACTIVITIES OF DAILY LIVING (ADL)
ADLS_ACUITY_SCORE: 16

## 2021-03-27 NOTE — ED NOTES
Report received. Care of patient assumed. Admitting MD at bedside. Family at bedside. Patient on ECG NIBP and SaO2 monitoring. VSS. Continue to monitor.

## 2021-03-27 NOTE — PLAN OF CARE
Summary: Nausea & palpitations  DATE & TIME: 3/27/21  [id number]   Cognitive Concerns/ Orientation : A&O x4   BEHAVIOR & AGGRESSION TOOL COLOR: Green  CIWA SCORE: n/a   ABNL VS/O2: VSS on RA except hypertension  MOBILITY: A2 GBW  PAIN MANAGMENT: c/o LBE cramps, 325mg Tylenol given, pain reduced  DIET: Reg  BOWEL/BLADDER: Purewick in place overnight  ABNL LAB/BG: Na 114, 120  DRAIN/DEVICES: L arm PIV, D5 100ml/hr  TELEMETRY RHYTHM: SR with 1deg AVB  SKIN: WDL  TESTS/PROCEDURES: none at this time  D/C DAY/GOALS/PLACE: discharge back to [facility] pending improvement  OTHER IMPORTANT INFO: Seizure precautions.

## 2021-03-27 NOTE — PROGRESS NOTES
RECEIVING UNIT ED HANDOFF REVIEW    ED Nurse Handoff Report was reviewed by: Prabhakar Nunes RN on March 27, 2021 at 12:29 AM

## 2021-03-27 NOTE — PROGRESS NOTES
[facility]    Hospitalist Progress Note      Assessment & Plan   Tanisha Muhammad is a 89 year old female who was admitted on 3/26/2021 with nausea and headache and found to have symptomatic acute hyponatremia.    Symptomatic acute hyponatremia  Na is 114, down from 137 on 3/4/2021. She is euvolemic on exam. She has dilute urine on UA with relatively low urine osmolality, raising suspicion for polydipsia as the cause (given that she may have drunk as much as a gallon of water today, and reports otherwise eating a balanced diet at home; low solute diet being on the differential). Since she is unable to tell me how long she has been drinking this much water, she could be at risk for cerebral pontine myelinolysis. TSH WNL  -- q 4 Na checks to continue throughout today  -- Repeat Na up to 122 and stable there, continue D5 150ml/hr--->Reduce to 75ml/hr given down trending Na 3/27 evening  -- goal to have Na less than or equal to 122 for first 24 hours (8pm 3/27), Na less than or equal to 128 in next 24 hours (ending 8 pm 3/28)  -- Seizure precautions  -- Once Na is stabilized she might need water restriction added  -- consideration for nephrology consult this admit if Na difficult to control     Suspected Stage 3 CKD  Cr was 0.94 on admit and was 1.25 on 3/4/2021.  - Monitor     Accelerated hypertension  TTE in 2018 showed preserved LVEF with moderate MR.  - resume PTA amlodipine 10mg daily, hold PTA losartan     Gout  PTA not on any medications.    Depression  Hold PTA lexapro given association with hyponatremia    COVID-19 screening-negative    DVT Prophylaxis: Pneumatic Compression Devices  Code Status: Full Code  Expected discharge: 2 - 3 days, recommended to prior living arrangement once sodium stable    Ramona Yanez, DO  Text Page (7am - 6pm)    Interval History   Patient seen and examined. She feels okay. Denies chest pain, shortness of breath, nausea, vomiting. Reports she has a good  appetite and that she enjoys cooking for herself typically. Denies consuming large amounts of water (urine favors otherwise).     -Data reviewed today: I reviewed all new labs and imaging results over the last 24 hours. I personally reviewed no images or EKG's today.    Physical Exam   Temp: 98  F (36.7  C) Temp src: Oral BP: 133/58 Pulse: 69   Resp: 16 SpO2: 100 % O2 Device: None (Room air)    Vitals:    03/26/21 1808   Weight: 70.3 kg (155 lb)     Vital Signs with Ranges  Temp:  [97.5  F (36.4  C)-98  F (36.7  C)] 98  F (36.7  C)  Pulse:  [69-80] 69  Resp:  [12-20] 16  BP: (133-185)/(57-83) 133/58  SpO2:  [92 %-100 %] 100 %  I/O last 3 completed shifts:  In: 120 [P.O.:120]  Out: -     Constitutional: Awake, alert, cooperative, no apparent distress  Respiratory: Clear to auscultation bilaterally, no crackles or wheezing  Cardiovascular: Regular rate and rhythm, normal S1 and S2, and no murmur noted  GI: Normal bowel sounds, soft, non-distended, non-tender  Skin/Integumen: No rashes, no cyanosis, mild-mod chronic edema around left ankle, otherwise trace edema  Other: Slightly hard of hearing (better on right side)    Medications     D5W 150 mL/hr at 03/27/21 1143       amLODIPine  10 mg Oral Daily     sodium chloride (PF)  3 mL Intracatheter Q8H       Data   Recent Labs   Lab 03/27/21  1044 03/27/21  0651 03/27/21  0221 03/26/21  2035   WBC  --  4.6  --  5.9   HGB  --  10.7*  --  11.9   MCV  --  78  --  78   PLT  --  117*  --  137*   * 122* 120* 114*   POTASSIUM  --  3.9  --  4.0   CHLORIDE  --  94  --  83*   CO2  --  25  --  23   BUN  --  11  --  13   CR  --  1.03  --  0.94   ANIONGAP  --  3  --  8   GALINA  --  8.8  --  9.1   GLC  --  121*  --  120*   TROPI  --   --   --  <0.015       No results found for this or any previous visit (from the past 24 hour(s)).

## 2021-03-27 NOTE — ED NOTES
DATE:  3/26/2021   TIME OF RECEIPT FROM LAB:  2128  LAB TEST:  Sodium  LAB VALUE:  114  RESULTS GIVEN WITH READ-BACK TO (PROVIDER):  Sonali Parker*  TIME LAB VALUE REPORTED TO PROVIDER:   2131

## 2021-03-27 NOTE — ED NOTES
DATE:  3/26/2021   TIME OF RECEIPT FROM LAB:  7028  LAB TEST:  Serum Onsolality  LAB VALUE:  240  RESULTS GIVEN WITH READ-BACK TO (PROVIDER):  Dr. Trierweiler  TIME LAB VALUE REPORTED TO PROVIDER:   4370

## 2021-03-27 NOTE — PROVIDER NOTIFICATION
.MD Notification    Notified Person: MD    Notified Person Name: Pina    Notification Date/Time: 3/27/21  0437    Notification Interaction: page    Purpose of Notification: LONA Na 114 @1045; Na 120 @0221. D5 running at 100ml/hr, CTM    Orders Received:    Comments:

## 2021-03-27 NOTE — PLAN OF CARE
DATE & TIME: 3/27/2021 [id number]    Cognitive Concerns/ Orientation : A&O x4 with intermittent confusions--she did not remember how she got to the hospital yesterday.    BEHAVIOR & AGGRESSION TOOL COLOR: green   CIWA SCORE: na    ABNL VS/O2: vss, on RA  MOBILITY: SBA with GB to chair for meals.   PAIN MANAGMENT: denied pain. Having intermittent cramps to BLE. Mag and phos normal.   DIET: regular, good appetite.   BOWEL/BLADDER: purewick in place. Strict I&O.   ABNL LAB/BG: Na Q 4 hours--122 at 0600, 122 at 1000. D5W increased to 150 ml/hour. Will recheck at 1400. Goal is to keep Na less or equal to 122 in the first 24 hours (starting @2035 on 3/26/2021).   DRAIN/DEVICES: PIV on left arm, infusing   TELEMETRY RHYTHM: NSR with 1st degree AVB.   SKIN: intact.   TESTS/PROCEDURES: Na q 4 hours.   D/C DATE: pending.   Discharge Barriers: pending Na improvement  OTHER IMPORTANT INFO: lesia BONNER/RN ROUNDING SIGNED OFF D/E SHIFT: lesia   COMMIT TO SIT DONE AND SIGNED OFF na   IS THE PATIENT ON REMDESIVIR? DATE OF LAST SCHEDULED DOSE: lesia

## 2021-03-27 NOTE — ED PROVIDER NOTES
"  History   Chief Complaint:  Palpitations and Nausea     HPI   Tanisha Muhammad is a 89 year old female with history of hypertension, chronic kidney disease and cardiac arrhythmia who presents with nausea. Patient states that she began feeling nauseous since last night. She notes intermittently feeling that her blood pressure is high with which she gets associated pounding in her ears; this is not currently present. She feels she may be experiencing some urinary frequency but denies dysuria. Denies chest pain, shortness of breath or dizziness. She denies palpitations. Denies abdominal pain.     Review of Systems   Respiratory: Negative for shortness of breath.    Cardiovascular: Negative for chest pain.   Gastrointestinal: Positive for nausea. Negative for abdominal pain.   Genitourinary: Positive for frequency. Negative for dysuria.   Neurological: Positive for headaches. Negative for dizziness.   All other systems reviewed and are negative.    Allergies:  Indomethacin  Keflex   Sulfamethoxazole-Trimethoprim    Medications:  Lexapro  Norvasc  Demadex  Cozaar    Past Medical History:    Cystocele  Gout  Tye's disease  Inactive Meniere's disease  Trigeminal neuralgia  Hypertension   Venous insufficiency  Cardiac arrhythmia  Chronic kidney disease   Lumbago    Past Surgical History:    Laser yag capsulotomy    Family History:    Mother- kidney cancer, hypertension   Father- emphysema  Brother- myocardial infarction, renal cancer    Social History:  Presents with her nephew  Lives with nephew    Physical Exam     Patient Vitals for the past 24 hrs:   BP Temp Temp src Pulse Resp SpO2 Height Weight   03/26/21 2130 (!) 181/83 -- -- 75 20 98 % -- --   03/26/21 2100 (!) 177/68 -- -- 75 12 92 % -- --   03/26/21 1808 (!) 173/68 97.5  F (36.4  C) Temporal 79 18 99 % 1.613 m (5' 3.5\") 70.3 kg (155 lb)       Physical Exam  General/Appearance: appears stated age, well-groomed, appears to not feel well but no obvious " discomfort  Eyes: EOMI, no scleral injection, no icterus  ENT: MMM  Neck: supple, nl ROM, no stiffness  Cardiovascular: RRR, nl S1S2, no m/r/g, 2+ pulses in all 4 extremities, cap refill <2sec  Respiratory: CTAB, good air movement throughout, no wheezes/rhonchi/rales, no increased WOB, no retractions  GI: abd soft, non-distended, nttp,  no HSM, no rebound, no guarding, nl BS  MSK: MEYERS, good tone, no bony abnormality  Skin: warm and well-perfused, no rash, no edema, no ecchymosis, nl turgor  Neuro: GCS 15, alert and oriented, no gross focal neuro deficits  Psych: interacts appropriately  Heme: no petechia, no purpura, no active bleeding    Emergency Department Course   ECG  ECG taken at 2048, ECG read at 2122  Nausea   Rate 76 bpm. OH interval 266 ms. QRS duration 90 ms. QT/QTc 378/425 ms. P-R-T axes 50 -9 10.     Laboratory:   CBC: WBC 5.9, HGB 11.9,  (L)  BMP: sodium 114 (L), chloride 83 (L), glucose 120 (H), GFR 52 (L) o/w WNL (Creatinine 0.94)      Troponin (Collected 2035): <0.015     UA with microscopic: protein albumin 20, bacteria few, mucous present o/w WNL      Fractional excretion of urine: creatinine urine 11, sodium urine mmol/L 30, %FENA 2.2    Emergency Department Course:    Reviewed:  I reviewed nursing notes, vitals, past medical history and care everywhere    Assessments:  2026 I obtained history and examined the patient as noted above.   2230 I rechecked the patient and explained findings.     Consults:  2240 I spoke with Dr. Heller of the Hospitalist service.     Interventions:  1812: Zofran 4 mg IV   2043: NS 1L IV Bolus    2043: Zofran 4 mg IV     Disposition:  The patient was admitted to the hospital under the care of Dr. Heller.     Impression & Plan     Medical Decision Making:  This patient is a pleasant 89-year-old female with history of chronic kidney disease who presents with general fatigue, weakness, not feeling well.  She does endorse some nausea.  She endorsed palpitations at  the triage desk but denied them during my history.  We cast a fairly wide net with labs which included electrolytes, infectious work-up, cardiac work-up.  All of this came back as normal with the exception of her sodium which was markedly low at 114.  This definitely would explain someone feeling unwell with nausea.  Other labs to help come up with more of an explanation for the hyponatremia are pending.  She will be coming into the hospitalist service for further work-up.    Covid-19  Tanisha Muhammad was evaluated during a global COVID-19 pandemic, which necessitated consideration that the patient might be at risk for infection with the SARS-CoV-2 virus that causes COVID-19.   Applicable protocols for evaluation were followed during the patient's care.   COVID-19 was considered as part of the patient's evaluation. The plan for testing is:  a test was obtained during this visit.    Diagnosis:    ICD-10-CM    1. Hyponatremia  E87.1        Scribe Disclosure:  I, Jennifer Martinez, am serving as a scribe at 8:18 PM on 3/26/2021 to document services personally performed by Sonali Parker MD based on my observations and the provider's statements to me.           Sonali Parker MD  03/26/21 9783

## 2021-03-27 NOTE — ED NOTES
"[facility]  ED Nurse Handoff Report    ED Chief complaint: Palpitations and Nausea      ED Diagnosis:   Final diagnoses:   Hyponatremia       Code Status: Discuss upon admission    Allergies:   Allergies   Allergen Reactions     Indomethacin      Cognitive impairment     Keflex [Cephalexin]      HIVES     Sulfamethoxazole-Trimethoprim      BACTRIM        Patient Story: Pt presents with symptoms of nausea and cardiac palpitations. Pt states that she began feeling nauseous last night. She also states that she began feeling her heart \"racing\" and that her blood pressure was high. Pt is hypertensive here.   Focused Assessment:  Sodium= 114 upon initial assessment. Feels heart racing. Admits to urinary frequency, but no other urinary symptoms.     Treatments and/or interventions provided: 0.9 NS bolus, Zofran X2  Patient's response to treatments and/or interventions: Reduction in nausea    To be done/followed up on inpatient unit:  See inpatient orders    Does this patient have any cognitive concerns?: Forgetful    Activity level - Baseline/Home:  Walker  Activity Level - Current:   Stand with assist x2    Patient's Preferred language: English   Needed?: No    Isolation: None  Infection: Not Applicable  Patient tested for COVID 19 prior to admission: YES  Bariatric?: No    Vital Signs:   Vitals:    03/26/21 1808 03/26/21 2100 03/26/21 2130   BP: (!) 173/68 (!) 177/68 (!) 181/83   Pulse: 79 75 75   Resp: 18 12 20   Temp: 97.5  F (36.4  C)     TempSrc: Temporal     SpO2: 99% 92% 98%   Weight: 70.3 kg (155 lb)     Height: 1.613 m (5' 3.5\")         Cardiac Rhythm:     Was the PSS-3 completed:   Yes  What interventions are required if any?               Family Comments: Nephew at bedside  OBS brochure/video discussed/provided to patient/family: N/A          For the majority of the shift this patient's behavior was Green.   Behavioral interventions performed were None.    ED NURSE PHONE NUMBER: " [phone]

## 2021-03-27 NOTE — ED NOTES
Patient reports feeling wet in her perineal area. RN and EDT at bedside to evaluate. Purewick cathter suction malfunctioned. RN and EDT assisted patient to roll side to side on cart. Perineum cleaned and linens changed. Chux placed beneath patient. Clean depends placed on patient. Purewick cathter positioned per 's instructions. Cathter to low continuous suction. Patient HOB adjusted for comfort. Warm blankets given. Family returned to bedside. Patient and guest updated on continued POC and waits. Pt requests water to drink. MD Parker updated. VORB. Patient and guest provided with cups of ice water. HOB raised and patient assisted to take a few drinks. Patient tolerated well. Patient denies additional needs. Continue to monitor.

## 2021-03-27 NOTE — PHARMACY-ADMISSION MEDICATION HISTORY
Pharmacy Medication History  Admission medication history interview status for the 3/26/2021  admission is complete. See EPIC admission navigator for prior to admission medications     Location of Interview: Patient room  Medication history sources: Patient, Surescripts and Care Everywhere    Significant changes made to the medication list:  Removed: cyanocobalamin  Added: multivitamin  Changed: Estradiol cream from twice weekly to PRN    In the past week, patient estimated taking medication this percent of the time: greater than 90%    Additional medication history information:   Patient was recently prescribed escitalopram on 3/11/21, she states she picked up the prescription but has not started taking it.    Medication reconciliation completed by provider prior to medication history? No    Time spent in this activity: 10 mins    Prior to Admission medications    Medication Sig Last Dose Taking? Auth Provider   amLODIPine (NORVASC) 10 MG tablet Take 1 tablet (10 mg) by mouth daily 3/26/2021 at am Yes Sesar Cuevas MD   estradiol (ESTRACE VAGINAL) 0.1 MG/GM vaginal cream Place 1 g vaginally twice a week Place 1g in vagina twice weekly  Patient taking differently: Place 1 g vaginally as needed Place 1g in vagina twice weekly PRN  at prn Yes Juan Pollard MD   losartan (COZAAR) 100 MG tablet TAKE 1 TABLET(100 MG) BY MOUTH DAILY 3/26/2021 at am Yes Ken Norton MD   multivitamin, therapeutic (THERA-VIT) TABS tablet Take 1 tablet by mouth daily Past Week at Unknown time Yes Unknown, Entered By History   torsemide (DEMADEX) 5 MG tablet Take 1 tablet (5 mg) by mouth daily Take in AM 3/26/2021 at am Yes Sesar Cuevas MD   escitalopram (LEXAPRO) 5 MG tablet Take 1 tablet (5 mg) by mouth daily has not started  Cailin Solis PA-C       The information provided in this note is only as accurate as the sources available at the time of update(s)

## 2021-03-27 NOTE — ED NOTES
Parminder BONNER at bedside. RN at bedside to collect Covid swab from right nare without complication. Patient tolerated well.

## 2021-03-27 NOTE — H&P
[facility]    History and Physical  Hospitalist       Date of Admission:  3/26/2021  Date of Service (when I saw the patient): 03/26/21    ASSESSMENT  Tanisha Muhammad is a markedly pleasant 89 year old woman with past medical history that is most notable for hypertension and gout, among others; who presents with nausea and headache and is found to have symptomatic acute hyponatremia.    PLAN    Symptomatic acute hyponatremia: Na is 114, down from 137 on 3/4/2021. She is euvolemic on exam. She has dilute urine on UA with relatively low urine osmolality, raising suspicion for polydipsia as the cause (given that she may have drunk as much as a gallon of water today, and reports otherwise eating a balanced diet at home; low solute diet being on the differential). Since she is unable to tell me how long she has been drinking this much water, she could be at risk for cerebral pontine myelinolysis.    -- Inpatient. q 4 Na checks. Check STAT Na on arrival to the floor; if Na rising above 6 points in 24 hours, would start D5W (addendum: Repeat Na 120 so D5 started at 100 ml/hour STAT).    -- Consider Nephrology consultation in AM if management becomes complex    -- Check TSH    -- Seizure precautions    -- Once Na is stabilized she might need water restriction while she is here.    Suspected Stage 3 CKD: Cr is 0.94 today and was 1.25 on 3/4/2021.    Accelerated hypertension: TTE in 2018 showed preserved LVEF with moderate MR.    -- Resume home medications when verified    Gout: Resume home medications when verified    Rule Out COVID-19 infection  This patient was evaluated during a global COVID-19 pandemic, which necessitated consideration that the patient might be at risk for infection with the SARS-CoV-2 virus that causes COVID-19. Applicable protocols for evaluation were followed during the patient's care. Low suspicion for infection.   -follow up COVID-19 PCR test result  -no current indication  "for precautions    Chief Complaint   Nausea    History is obtained from the patient, her son at the bedside, and the ED physician whom I have spoken with    History of Present Illness   Tanisha Muhammad is a markedly pleasant 89 year old woman who lives independently, who presents with one day of acute onset nausea and headache associated with feeling weak and tired. She has vomited once during this time frame. She has felt very thirsty and her son at the bedside says he noticed that she drank about a gallon of water today. She herself is unsure if she drank that much water, or how much she has really had for fluid intake in the past few days. She adds that normally she is an active person and likes to get out for walks but she hasn't done that in the past month. Now in the ED bed she feels some mid back discomfort that she had not had before coming in tonight. She otherwise denies any diarrhea, abdominal pain, chest pain, dyspnea, cough, fever, chills, sweats, sore throat, change in vision, change in her markedly diminished hearing chronically, or diminished appetite (noting that she recently ate an eggplant casserole), or any alcohol use, or any other acute complaints.    In the ED, Blood pressure (!) 181/83, pulse 75, temperature 97.5  F (36.4  C), temperature source Temporal, resp. rate 20, height 1.613 m (5' 3.5\"), weight 70.3 kg (155 lb), SpO2 98 %, not currently breastfeeding.    CBC and BMP were notable for , Na 114, Cl 83, otherwise were within the normal reference range. Glucose 120. Serum Osmolality 240. UA showed no pyuria or hematuria, mild proteinuria, sp gravity 1.003. Urine Na was 30, FeNa 2.2. Urine Osmolality 104. Troponin negative. EKG showed NSR with 1st degree AVB.     She was given Zofran and one liter NS in the ED.    PHYSICAL EXAM  Blood pressure (!) 181/83, pulse 75, temperature 97.5  F (36.4  C), temperature source Temporal, resp. rate 20, height 1.613 m (5' 3.5\"), weight 70.3 kg " (155 lb), SpO2 98 %, not currently breastfeeding.  Constitutional: Alert and oriented to person, place and time; no apparent distress; markedly hard of hearing especially on the left; appears thin and frail  HEENT: normocephalic moist mucus membranes  Respiratory: lungs clear to auscultation bilaterally  Cardiovascular: regular S1 S2   GI: abdomen soft non tender non distended bowel sounds positive  Lymph/Hematologic: no pallor, no cervical lymphadenopathy  Skin: no rash, good turgor  Musculoskeletal: mild bilateral LE edema  Neurologic: extra-ocular muscles intact; moves all four extremities  Psychiatric: appropriate affect, insight and judgment     DVT Prophylaxis: Pneumatic Compression Devices  Code Status: Full Code    Disposition: Expected discharge in 2-3 days    Christo Heller MD    Past Medical History    I have reviewed this patient's medical history and updated it with pertinent information if needed.   Past Medical History:   Diagnosis Date     Cystocele 03/2010    midline     Elevated glucose      Gout 03/2010     Tye's disease 2010     Inactive Ménière's disease     Left ear deafness     Post-menopausal bleeding 01/02/2014    possibly due to pessary, area of irritated skin visualized on exam with active light bleeding posterior to cervix. Will need bx if continues following estrogen cream and leaving pessary out for 3-4 weeks.      Rectocele      Trigeminal neuralgia 2015     Unspecified essential hypertension      Unspecified venous (peripheral) insufficiency        Past Surgical History   I have reviewed this patient's surgical history and updated it with pertinent information if needed.  Past Surgical History:   Procedure Laterality Date     C DENTAL CONSULTATION      Dr Kahlil CEJA EYE EXAM, EST PATIENT,COMPREHESV      DR white     CATARACT IOL, RT/LT      Left     LASER YAG CAPSULOTOMY Left 10/2/2015    Procedure: LASER YAG CAPSULOTOMY;  Surgeon: Pieter Rios MD;  Location: [facility]        Prior to Admission Medications   Prior to Admission Medications   Prescriptions Last Dose Informant Patient Reported? Taking?   Cyanocobalamin (VITAMIN B-12 PO)   Yes No   Sig: Take 1 tablet by mouth daily    amLODIPine (NORVASC) 10 MG tablet   No No   Sig: Take 1 tablet (10 mg) by mouth daily   escitalopram (LEXAPRO) 5 MG tablet   No No   Sig: Take 1 tablet (5 mg) by mouth daily   estradiol (ESTRACE VAGINAL) 0.1 MG/GM vaginal cream   No No   Sig: Place 1 g vaginally twice a week Place 1g in vagina twice weekly   losartan (COZAAR) 100 MG tablet   No No   Sig: TAKE 1 TABLET(100 MG) BY MOUTH DAILY   torsemide (DEMADEX) 5 MG tablet   No No   Sig: Take 1 tablet (5 mg) by mouth daily Take in AM      Facility-Administered Medications: None     Allergies   Allergies   Allergen Reactions     Indomethacin      Cognitive impairment     Keflex [Cephalexin]      HIVES     Sulfamethoxazole-Trimethoprim      BACTRIM        Social History   I have reviewed this patient's social history and updated it with pertinent information if needed. Tanisha Muhammad  reports that she has never smoked. She has never used smokeless tobacco. She reports current alcohol use. She reports that she does not use drugs.    Family History   Family history assessed and, except as above, is non-contributory.    Family History   Problem Relation Age of Onset     Cancer Mother         kidney     Hypertension Mother      Heart Disease Father      Respiratory Father         emphysema     Heart Disease Brother         MI     Arthritis Sister         fibromyalgia     Heart Disease Brother         stent 4     Cancer Brother         renal        Review of Systems   The 10 point Review of Systems is negative other than noted in the HPI or here.     Primary Care Physician   Sesar Cuevas    Data   Labs Ordered and Resulted from Time of ED Arrival Up to the Time of Departure from the ED   CBC WITH PLATELETS DIFFERENTIAL - Abnormal; Notable for the following  components:       Result Value    Hematocrit 32.2 (*)     MCHC 37.0 (*)     Platelet Count 137 (*)     All other components within normal limits   BASIC METABOLIC PANEL - Abnormal; Notable for the following components:    Sodium 114 (*)     Chloride 83 (*)     Glucose 120 (*)     GFR Estimate 52 (*)     GFR Estimate If Black 60 (*)     All other components within normal limits   ROUTINE UA WITH MICROSCOPIC - Abnormal; Notable for the following components:    Protein Albumin Urine 20 (*)     Bacteria Urine Few (*)     Mucous Urine Present (*)     All other components within normal limits   TROPONIN I   FRACTIONAL EXCRETION OF SODIUM   SARS-COV-2 (COVID-19) VIRUS RT-PCR   OSMOLALITY URINE   OSMOLALITY   PERIPHERAL IV CATHETER       Data reviewed today:  I personally reviewed the EKG tracing showing NSR with 1st degree AVB.    No results found for this or any previous visit (from the past 24 hour(s)).

## 2021-03-28 ENCOUNTER — APPOINTMENT (OUTPATIENT)
Dept: PHYSICAL THERAPY | Facility: CLINIC | Age: 86
DRG: 641 | End: 2021-03-28
Attending: HOSPITALIST
Payer: MEDICARE

## 2021-03-28 LAB
ANION GAP SERPL CALCULATED.3IONS-SCNC: 5 MMOL/L (ref 3–14)
BUN SERPL-MCNC: 18 MG/DL (ref 7–30)
CALCIUM SERPL-MCNC: 9.1 MG/DL (ref 8.5–10.1)
CHLORIDE SERPL-SCNC: 96 MMOL/L (ref 94–109)
CO2 SERPL-SCNC: 24 MMOL/L (ref 20–32)
CREAT SERPL-MCNC: 1.02 MG/DL (ref 0.52–1.04)
ERYTHROCYTE [DISTWIDTH] IN BLOOD BY AUTOMATED COUNT: 12.5 % (ref 10–15)
GFR SERPL CREATININE-BSD FRML MDRD: 47 ML/MIN/{1.73_M2}
GLUCOSE SERPL-MCNC: 107 MG/DL (ref 70–99)
HCT VFR BLD AUTO: 31.1 % (ref 35–47)
HGB BLD-MCNC: 11.5 G/DL (ref 11.7–15.7)
MAGNESIUM SERPL-MCNC: 2 MG/DL (ref 1.6–2.3)
MCH RBC QN AUTO: 29.2 PG (ref 26.5–33)
MCHC RBC AUTO-ENTMCNC: 37 G/DL (ref 31.5–36.5)
MCV RBC AUTO: 79 FL (ref 78–100)
PHOSPHATE SERPL-MCNC: 2.4 MG/DL (ref 2.5–4.5)
PLATELET # BLD AUTO: 138 10E9/L (ref 150–450)
POTASSIUM SERPL-SCNC: 4.1 MMOL/L (ref 3.4–5.3)
RBC # BLD AUTO: 3.94 10E12/L (ref 3.8–5.2)
SODIUM SERPL-SCNC: 122 MMOL/L (ref 133–144)
SODIUM SERPL-SCNC: 122 MMOL/L (ref 133–144)
SODIUM SERPL-SCNC: 124 MMOL/L (ref 133–144)
SODIUM SERPL-SCNC: 125 MMOL/L (ref 133–144)
SODIUM SERPL-SCNC: 125 MMOL/L (ref 133–144)
WBC # BLD AUTO: 5.2 10E9/L (ref 4–11)

## 2021-03-28 PROCEDURE — 250N000013 HC RX MED GY IP 250 OP 250 PS 637: Performed by: HOSPITALIST

## 2021-03-28 PROCEDURE — 84295 ASSAY OF SERUM SODIUM: CPT | Performed by: HOSPITALIST

## 2021-03-28 PROCEDURE — 97530 THERAPEUTIC ACTIVITIES: CPT | Mod: GP

## 2021-03-28 PROCEDURE — 97161 PT EVAL LOW COMPLEX 20 MIN: CPT | Mod: GP

## 2021-03-28 PROCEDURE — 84100 ASSAY OF PHOSPHORUS: CPT | Performed by: HOSPITALIST

## 2021-03-28 PROCEDURE — 97116 GAIT TRAINING THERAPY: CPT | Mod: GP

## 2021-03-28 PROCEDURE — 99232 SBSQ HOSP IP/OBS MODERATE 35: CPT | Performed by: HOSPITALIST

## 2021-03-28 PROCEDURE — 85027 COMPLETE CBC AUTOMATED: CPT | Performed by: HOSPITALIST

## 2021-03-28 PROCEDURE — 36415 COLL VENOUS BLD VENIPUNCTURE: CPT | Performed by: HOSPITALIST

## 2021-03-28 PROCEDURE — 83735 ASSAY OF MAGNESIUM: CPT | Performed by: HOSPITALIST

## 2021-03-28 PROCEDURE — 258N000003 HC RX IP 258 OP 636: Performed by: HOSPITALIST

## 2021-03-28 PROCEDURE — 120N000001 HC R&B MED SURG/OB

## 2021-03-28 PROCEDURE — 80048 BASIC METABOLIC PNL TOTAL CA: CPT | Performed by: HOSPITALIST

## 2021-03-28 RX ADMIN — AMLODIPINE BESYLATE 10 MG: 10 TABLET ORAL at 08:08

## 2021-03-28 RX ADMIN — DEXTROSE MONOHYDRATE: 50 INJECTION, SOLUTION INTRAVENOUS at 04:45

## 2021-03-28 RX ADMIN — POTASSIUM & SODIUM PHOSPHATES POWDER PACK 280-160-250 MG 1 PACKET: 280-160-250 PACK at 08:08

## 2021-03-28 RX ADMIN — SENNOSIDES AND DOCUSATE SODIUM 1 TABLET: 8.6; 5 TABLET ORAL at 08:22

## 2021-03-28 RX ADMIN — POTASSIUM & SODIUM PHOSPHATES POWDER PACK 280-160-250 MG 1 PACKET: 280-160-250 PACK at 20:20

## 2021-03-28 RX ADMIN — SENNOSIDES AND DOCUSATE SODIUM 2 TABLET: 8.6; 5 TABLET ORAL at 20:20

## 2021-03-28 ASSESSMENT — ACTIVITIES OF DAILY LIVING (ADL)
ADLS_ACUITY_SCORE: 20
ADLS_ACUITY_SCORE: 21

## 2021-03-28 ASSESSMENT — MIFFLIN-ST. JEOR: SCORE: 1072.06

## 2021-03-28 NOTE — PROVIDER NOTIFICATION
MD Notification    Notified Person: MD    Notified Person Name: Dr. Walters, Dr. Yanez    Notification Date/Time: 3/27/2021 @1935    Notification Interaction: [employer]    Purpose of Notification: Pts Sodium down to 119 from 120.     Orders Received: Decrease D5 to 75/hr    Comments: response from Dr. Yanez

## 2021-03-28 NOTE — PLAN OF CARE
DATE & TIME: 3/28/2021 [id number]    Cognitive Concerns/ Orientation : disoriented to time.   BEHAVIOR & AGGRESSION TOOL COLOR: green   CIWA SCORE: na    ABNL VS/O2: vss, on RA  MOBILITY: SBA x1 with GB/WK. Pt felt she was not as strong as prior to admission. Will obtain PT eval order.   PAIN MANAGMENT: denied pain.   DIET: regular, good appetite.   BOWEL/BLADDER: inc of urine. Purewick in place for strict I&O. C/o constipation, PRN senna-s 1 tab given this am. No result yet. Will give senna again this evening if needed.   ABNL LAB/BG: na 124 at 1000, will recheck at 1400-125, next at 1800. Goal is less or equal to 128 until 2000 today.  DRAIN/DEVICES: PIV on left arm, infusing.   TELEMETRY RHYTHM: NSR with 1st degree AVB.   SKIN: intact.   TESTS/PROCEDURES: Na Q 4.   D/C DATE: pending Na improvement and PT eval--recommend home with home PT.   Discharge Barriers: na level and PT eval  OTHER IMPORTANT INFO: interested in Meals on wheel, care coordinator aware.   MD/RN ROUNDING SIGNED OFF D/E SHIFT: na   COMMIT TO SIT DONE AND SIGNED OFF: na   IS THE PATIENT ON REMDESIVIR? DATE OF LAST SCHEDULED DOSE: na.

## 2021-03-28 NOTE — PROGRESS NOTES
[facility]    Hospitalist Progress Note      Assessment & Plan   Tanisha Muhammad is a 89 year old female who was admitted on 3/26/2021 with nausea and headache and found to have symptomatic acute hyponatremia.    Symptomatic acute hyponatremia  Na is 114, down from 137 on 3/4/2021. She is euvolemic on exam. She has dilute urine on UA with relatively low urine osmolality, raising suspicion for polydipsia as the cause (given that she may have drunk as much as a gallon of water today, and reports otherwise eating a balanced diet at home; low solute diet being on the differential). Since she is unable to tell me how long she has been drinking this much water, she could be at risk for cerebral pontine myelinolysis. TSH WNL  -- q6h Na checks  -- continue IVF @50ml hr until evening, then monitor off IVF and trend sodium overnight  -- Seizure precautions  -- Once Na is stabilized she might need water restriction added  -- consideration for nephrology consult this admit if Na difficult to control     Stage 3 CKD  Cr was 0.94 on admit and was 1.25 on 3/4/2021.  - Monitor     Accelerated hypertension  TTE in 2018 showed preserved LVEF with moderate MR.  - resumed PTA amlodipine 10mg daily, hold PTA losartan     Hypophosphatemia  Replete per protocol    Gout  PTA not on any medications.    Depression  Hold PTA lexapro given association with hyponatremia    COVID-19 screening-negative    DVT Prophylaxis: Pneumatic Compression Devices  Code Status: Full Code  Expected discharge: 1-2 days, recommended to prior living arrangement once sodium stable off IVF    Ramona Yanez, DO  Text Page (7am - 6pm)    Interval History   Patient seen and examined. She feels fine. Urinating frequently overnight, relatively dilute urine. No complaints otherwise, hopeful for discharge tomorrow.    -Data reviewed today: I reviewed all new labs and imaging results over the last 24 hours. I personally reviewed no images or  EKG's today.    Physical Exam   Temp: 98  F (36.7  C) Temp src: Oral BP: 134/62 Pulse: 80   Resp: 14 SpO2: 96 % O2 Device: None (Room air)    Vitals:    03/26/21 1808 03/28/21 0651   Weight: 70.3 kg (155 lb) 69.5 kg (153 lb 3.5 oz)     Vital Signs with Ranges  Temp:  [97.4  F (36.3  C)-98  F (36.7  C)] 98  F (36.7  C)  Pulse:  [69-80] 80  Resp:  [14-16] 14  BP: (134-141)/(52-62) 134/62  SpO2:  [96 %-97 %] 96 %  I/O last 3 completed shifts:  In: 3861.25 [P.O.:720; I.V.:3141.25]  Out: 3175 [Urine:3175]    Constitutional: Awake, alert, cooperative, no apparent distress  Respiratory: Clear to auscultation bilaterally, no crackles or wheezing  Cardiovascular: Regular rate and rhythm, normal S1 and S2, and no murmur noted  GI: Normal bowel sounds, soft, non-distended, non-tender  Skin/Integumen: No rashes, no cyanosis, mild-mod chronic edema around left ankle, otherwise trace edema  Other: Slightly hard of hearing (better on right side)    Medications     D5W 50 mL/hr at 03/28/21 1225       amLODIPine  10 mg Oral Daily     potassium & sodium phosphates  1 packet Oral or Feeding Tube BID     sodium chloride (PF)  3 mL Intracatheter Q8H       Data   Recent Labs   Lab 03/28/21  1414 03/28/21  1040 03/28/21  0608 03/27/21  1418 03/27/21  1418 03/27/21  0651 03/27/21  0651 03/26/21  2035 03/26/21 2035   WBC  --   --  5.2  --   --   --  4.6  --  5.9   HGB  --   --  11.5*  --   --   --  10.7*  --  11.9   MCV  --   --  79  --   --   --  78  --  78   PLT  --   --  138*  --   --   --  117*  --  137*   * 124* 125*   < > 120*   < > 122*   < > 114*   POTASSIUM  --   --  4.1  --  4.2  --  3.9  --  4.0   CHLORIDE  --   --  96  --   --   --  94  --  83*   CO2  --   --  24  --   --   --  25  --  23   BUN  --   --  18  --   --   --  11  --  13   CR  --   --  1.02  --   --   --  1.03  --  0.94   ANIONGAP  --   --  5  --   --   --  3  --  8   GALINA  --   --  9.1  --   --   --  8.8  --  9.1   GLC  --   --  107*  --   --   --  121*  --   120*   TROPI  --   --   --   --   --   --   --   --  <0.015    < > = values in this interval not displayed.       No results found for this or any previous visit (from the past 24 hour(s)).

## 2021-03-28 NOTE — PROGRESS NOTES
03/28/21 1400   Quick Adds   Type of Visit Initial PT Evaluation   Living Environment   People in home alone   Current Living Arrangements condominium   Home Accessibility no concerns   Transportation Anticipated family or friend will provide   Self-Care   Usual Activity Tolerance good   Current Activity Tolerance moderate   Regular Exercise No   Equipment Currently Used at Home none   Activity/Exercise/Self-Care Comment Has access to FWW   Disability/Function   Fall history within last six months no   General Information   Onset of Illness/Injury or Date of Surgery 03/26/21   Referring Physician Dr. Heller   Patient/Family Therapy Goals Statement (PT) To go home   Pertinent History of Current Problem (include personal factors and/or comorbidities that impact the POC) Pt is a 84 year old female admitted with hyponatremia   Existing Precautions/Restrictions fall   Range of Motion (ROM)   ROM Quick Adds ROM WFL   Strength   Manual Muscle Testing Quick Adds Strength WFL   Bed Mobility   Comment (Bed Mobility) SBA   Transfers   Transfer Safety Comments CGA   Gait/Stairs (Locomotion)   Comment (Gait/Stairs) 25' FWW CGA, decreased debbie and step length   Balance   Balance Comments Good in sitting, fair in standing   Clinical Impression   Criteria for Skilled Therapeutic Intervention yes, treatment indicated   PT Diagnosis (PT) Impaired ambulation   Influenced by the following impairments Decreased endurance, decreased balance   Functional limitations due to impairments Difficulty with bed mobility, transfers, ambulation   Clinical Presentation Stable/Uncomplicated   Clinical Presentation Rationale VSS, pain controlled   Clinical Decision Making (Complexity) low complexity   Therapy Frequency (PT) Daily   Predicted Duration of Therapy Intervention (days/wks) 3 days   Planned Therapy Interventions (PT) balance training;bed mobility training;patient/family education;gait training;transfer training   Anticipated  Equipment Needs at Discharge (PT) walker, rolling   Risk & Benefits of therapy have been explained evaluation/treatment results reviewed;care plan/treatment goals reviewed;risks/benefits reviewed;current/potential barriers reviewed;participants voiced agreement with care plan;participants included;patient   PT Discharge Planning    PT Discharge Recommendation (DC Rec) home with home care physical therapy   PT Rationale for DC Rec At baseline, pt lives alone in a condo with no accessibility concerns. Pt reports that her neighbor is very supportive and able to assist at discharge. Pt reports very active lifestyle prior to admit. This date, pt requires SBA to CGA for mobility. Anticipate with further medical management and therapy, patient will progress mobility and be safe to discharge home with HHPT to return to baseline mobility. Pt will require assist of one, assistive device, and considerable effort to come and go from house, therefore, home health PT is recommended.    PT Brief overview of current status  SBA to CGA, ambulates 150' FWW   Total Evaluation Time   Total Evaluation Time (Minutes) 10

## 2021-03-28 NOTE — PLAN OF CARE
DATE & TIME: 3/27/2021 9259-9166  Cognitive Concerns/ Orientation : A&O x4 with intermittent confusions--she did not remember how she got to the hospital yesterday.    BEHAVIOR & AGGRESSION TOOL COLOR: green   CIWA SCORE: na      ABNL VS/O2: vss, on RA  MOBILITY: SBA with GB to chair for meals.   PAIN MANAGMENT: denied pain. Having intermittent cramps to BLE. Mag and phos normal.   DIET: regular, good appetite.   BOWEL/BLADDER: purewick in place. Strict I&O.   ABNL LAB/BG: Na Q 4 hours--119, 120, 122. D5W decreased to 75 ml/hour.   DRAIN/DEVICES: PIV on left arm, infusing   TELEMETRY RHYTHM: NSR with 1st degree AVB.   SKIN: intact.   TESTS/PROCEDURES: Na q 4 hours.   D/C DAY/GOALS/PLACE: 2-3 days pending Na improvement.

## 2021-03-29 ENCOUNTER — APPOINTMENT (OUTPATIENT)
Dept: OCCUPATIONAL THERAPY | Facility: CLINIC | Age: 86
DRG: 641 | End: 2021-03-29
Attending: HOSPITALIST
Payer: MEDICARE

## 2021-03-29 ENCOUNTER — APPOINTMENT (OUTPATIENT)
Dept: PHYSICAL THERAPY | Facility: CLINIC | Age: 86
DRG: 641 | End: 2021-03-29
Attending: HOSPITALIST
Payer: MEDICARE

## 2021-03-29 LAB
ALBUMIN UR-MCNC: 10 MG/DL
ANION GAP SERPL CALCULATED.3IONS-SCNC: 7 MMOL/L (ref 3–14)
APPEARANCE UR: CLEAR
BACTERIA #/AREA URNS HPF: ABNORMAL /HPF
BILIRUB UR QL STRIP: NEGATIVE
BUN SERPL-MCNC: 18 MG/DL (ref 7–30)
CALCIUM SERPL-MCNC: 9.3 MG/DL (ref 8.5–10.1)
CHLORIDE SERPL-SCNC: 96 MMOL/L (ref 94–109)
CO2 SERPL-SCNC: 21 MMOL/L (ref 20–32)
COLOR UR AUTO: ABNORMAL
CREAT SERPL-MCNC: 1.1 MG/DL (ref 0.52–1.04)
GFR SERPL CREATININE-BSD FRML MDRD: 43 ML/MIN/{1.73_M2}
GLUCOSE SERPL-MCNC: 106 MG/DL (ref 70–99)
GLUCOSE UR STRIP-MCNC: NEGATIVE MG/DL
HGB UR QL STRIP: NEGATIVE
KETONES UR STRIP-MCNC: NEGATIVE MG/DL
LEUKOCYTE ESTERASE UR QL STRIP: NEGATIVE
MAGNESIUM SERPL-MCNC: 2.1 MG/DL (ref 1.6–2.3)
MUCOUS THREADS #/AREA URNS LPF: PRESENT /LPF
NITRATE UR QL: NEGATIVE
OSMOLALITY UR: 143 MMOL/KG (ref 100–1200)
PH UR STRIP: 5.5 PH (ref 5–7)
PHOSPHATE SERPL-MCNC: 3.2 MG/DL (ref 2.5–4.5)
POTASSIUM SERPL-SCNC: 4.1 MMOL/L (ref 3.4–5.3)
RBC #/AREA URNS AUTO: 0 /HPF (ref 0–2)
SODIUM SERPL-SCNC: 120 MMOL/L (ref 133–144)
SODIUM SERPL-SCNC: 120 MMOL/L (ref 133–144)
SODIUM SERPL-SCNC: 124 MMOL/L (ref 133–144)
SODIUM SERPL-SCNC: 125 MMOL/L (ref 133–144)
SOURCE: ABNORMAL
SP GR UR STRIP: 1 (ref 1–1.03)
SQUAMOUS #/AREA URNS AUTO: 0 /HPF (ref 0–1)
UROBILINOGEN UR STRIP-MCNC: 0 MG/DL (ref 0–2)
WBC #/AREA URNS AUTO: 1 /HPF (ref 0–5)

## 2021-03-29 PROCEDURE — 120N000001 HC R&B MED SURG/OB

## 2021-03-29 PROCEDURE — 80048 BASIC METABOLIC PNL TOTAL CA: CPT | Performed by: HOSPITALIST

## 2021-03-29 PROCEDURE — 99232 SBSQ HOSP IP/OBS MODERATE 35: CPT | Performed by: HOSPITALIST

## 2021-03-29 PROCEDURE — 97110 THERAPEUTIC EXERCISES: CPT | Mod: GP

## 2021-03-29 PROCEDURE — 97165 OT EVAL LOW COMPLEX 30 MIN: CPT | Mod: GO | Performed by: OCCUPATIONAL THERAPIST

## 2021-03-29 PROCEDURE — 84100 ASSAY OF PHOSPHORUS: CPT | Performed by: HOSPITALIST

## 2021-03-29 PROCEDURE — 36415 COLL VENOUS BLD VENIPUNCTURE: CPT | Performed by: HOSPITALIST

## 2021-03-29 PROCEDURE — 81001 URINALYSIS AUTO W/SCOPE: CPT | Performed by: HOSPITALIST

## 2021-03-29 PROCEDURE — 84295 ASSAY OF SERUM SODIUM: CPT | Performed by: HOSPITALIST

## 2021-03-29 PROCEDURE — 83735 ASSAY OF MAGNESIUM: CPT | Performed by: HOSPITALIST

## 2021-03-29 PROCEDURE — 83935 ASSAY OF URINE OSMOLALITY: CPT | Performed by: HOSPITALIST

## 2021-03-29 PROCEDURE — 250N000013 HC RX MED GY IP 250 OP 250 PS 637: Performed by: HOSPITALIST

## 2021-03-29 PROCEDURE — 97535 SELF CARE MNGMENT TRAINING: CPT | Mod: GO | Performed by: OCCUPATIONAL THERAPIST

## 2021-03-29 RX ORDER — SODIUM CHLORIDE 1 G/1
1 TABLET ORAL
Status: DISCONTINUED | OUTPATIENT
Start: 2021-03-29 | End: 2021-03-29

## 2021-03-29 RX ORDER — SODIUM CHLORIDE 1 G/1
1 TABLET ORAL
Status: DISCONTINUED | OUTPATIENT
Start: 2021-03-29 | End: 2021-03-31

## 2021-03-29 RX ADMIN — SODIUM CHLORIDE TAB 1 GM 1 G: 1 TAB at 20:24

## 2021-03-29 RX ADMIN — SODIUM CHLORIDE TAB 1 GM 1 G: 1 TAB at 16:04

## 2021-03-29 RX ADMIN — AMLODIPINE BESYLATE 10 MG: 10 TABLET ORAL at 09:25

## 2021-03-29 ASSESSMENT — ACTIVITIES OF DAILY LIVING (ADL)
ADLS_ACUITY_SCORE: 21
ADLS_ACUITY_SCORE: 20

## 2021-03-29 NOTE — PROGRESS NOTES
Paged for slight drop in repeat Na to 120; D5 fluids had been stopped around 6:30 PM last night. Per nursing staff she is eating here. UA shows ongoing low sp gravity and repeat urine osmolality checked and remains relatively low at 143. I will order fluid restriction 2000 ml for now and we plan to monitor next lab check 6 AM closely.

## 2021-03-29 NOTE — PROVIDER NOTIFICATION
MD Notification    Notified Person: MD    Notified Person Name: Dr. Heller    Notification Date/Time: 3/29/21 0100    Notification Interaction: [employer]    Purpose of Notification: Patients Na trending down 125-122-120. IVF stopped earlier.  Next check 0600. Please advise    Orders Received: collect UA and page with results    Comments: Start fluid Restriction and monitor

## 2021-03-29 NOTE — PROGRESS NOTES
03/29/21 0903   Quick Adds   Type of Visit Initial Occupational Therapy Evaluation   Living Environment   People in home alone   Current Living Arrangements condominium   Home Accessibility no concerns   Transportation Anticipated car, drives self  (pt states she drives herself - not sure if this is accurate)   Living Environment Comments pt lives on first floor, elevator in building.  Laundry room in basement   Self-Care   Usual Activity Tolerance good   Current Activity Tolerance moderate   Regular Exercise No   Equipment Currently Used at Home none  ( has a high toilet, grab bars in shower, shower chair, FWW)   Activity/Exercise/Self-Care Comment States she does not use walker at home   Disability/Function   Hearing Difficulty or Deaf yes   Patient's preferred means of communication verbal  (states she has not been able to hear in L ear since her 40s)   Describe hearing loss hearing loss on left side   Use of hearing assistive devices none   Were auxiliary aids offered? yes   Hearing Management pt turns her head to the left to hear out of her right ear   Wear Glasses or Blind no   Concentrating, Remembering or Making Decisions Difficulty no   Difficulty Communicating no   Difficulty Eating/Swallowing no   Walking or Climbing Stairs Difficulty no   Dressing/Bathing Difficulty no   Toileting issues no   Doing Errands Independently Difficulty (such as shopping) no   Fall history within last six months no   Change in Functional Status Since Onset of Current Illness/Injury no   General Information   Onset of Illness/Injury or Date of Surgery 03/28/21   Referring Physician Ramona Yanez   Patient/Family Therapy Goal Statement (OT) rerturn home   Additional Occupational Profile Info/Pertinent History of Current Problem Pt is a 89 year old female admitted with weakness and nausea, diagnosed with hyponatremia.  PMH includes CKD3, acclerated HTN   Performance Patterns (Routines, Roles, Habits) pt states she is  independent at home, does all her own cooking and cleaning   Existing Precautions/Restrictions fall   General Observations and Info Pt sitting up in a chair eating breakfast.  Willing to participate   Cognitive Status Examination   Orientation Status person  (partial place and date)   Affect/Mental Status (Cognitive) WFL;unable/difficult to assess   Cognitive Status Comments pt appeared to have some confusion but also had issues with hearing.  Will monitor and screen further next session   Visual Perception   Visual Impairment/Limitations WNL   Pain Assessment   Patient Currently in Pain No   Range of Motion Comprehensive   General Range of Motion no range of motion deficits identified   Comment, General Range of Motion B UEs   Strength Comprehensive (MMT)   General Manual Muscle Testing (MMT) Assessment no strength deficits identified   Comment, General Manual Muscle Testing (MMT) Assessment B UEs   Coordination   Upper Extremity Coordination No deficits were identified   Transfers   Transfers sit-stand transfer   Sit-Stand Transfer   Sit-Stand [place] (Transfers) supervision;contact guard   Assistive Device (Sit-Stand Transfers) walker, front-wheeled   Activities of Daily Living   BADL Assessment/Intervention feeding   Eating/Self Feeding   [place] Level (Feeding) independent   Clinical Impression   Criteria for Skilled Therapeutic Interventions Met (OT) yes   OT Diagnosis decreased endurance and possible safety issues for ADLS   OT Problem List-Impairments impacting ADL activity tolerance impaired;hearing;strength   Assessment of Occupational Performance 1-3 Performance Deficits   Identified Performance Deficits decreased endurance for bathing, household chores   Planned Therapy Interventions (OT) ADL retraining;cognition;home program guidelines;progressive activity/exercise   Clinical Decision Making Complexity (OT) low complexity   Therapy Frequency (OT) Daily   Predicted Duration of Therapy 4 days    Risk & Benefits of therapy have been explained evaluation/treatment results reviewed;care plan/treatment goals reviewed   OT Discharge Planning    OT Discharge Recommendation (DC Rec) Home with assist   OT Rationale for DC Rec Pt is below baseline with some drecreased endurance for ADLS and IADLS.  Had some confusion which may have been partially related to her hearing issues.  Would benefit from skilled OT to check further on cognitive skills and safety at home, increase endurance for aDLS.  Anticipate pt will be able to return home with ongoing assist by neighbors once medically stable.  Pt also mentioned a nephew that checks in on her, not clear exactly how much he helps her.

## 2021-03-29 NOTE — PLAN OF CARE
Patient is A&0x4 but very [place] and forgetful. She is up with SBA and belt. She spent most of the shift in the chair. She is incontinent of bladder. She is shakila Regular diet with an 1800cc fluid restriction. Lungs are diminished. She is on RA. No edema noted. Tele is SR w 1st degree AVB. No complaints of pain. Q6 hr NA checks. Noon check is at 120 (MD aware)

## 2021-03-29 NOTE — PLAN OF CARE
DATE & TIME: 3/28/2021 0945-2710   Cognitive Concerns/ Orientation : A&Ox4, forgetful  BEHAVIOR & AGGRESSION TOOL COLOR: green   CIWA SCORE: na    ABNL VS/O2: vss, on RA  MOBILITY: SBA x1 with GB/WK. Pt felt she was not as strong as prior to admission. PT following.   PAIN MANAGMENT: denied pain.   DIET: regular, good appetite.   BOWEL/BLADDER: inc of urine. Purewick in place for strict I&O. Constipation, 2 senna given with large results.  ABNL LAB/BG: na 124 at 0600, next recheck at 1200. 2000ml FR.  DRAIN/DEVICES: PIV on left arm, S.L.   TELEMETRY RHYTHM: NSR with 1st degree AVB.   SKIN: intact.   TESTS/PROCEDURES: Na Q 6.   D/C DATE:1-2 days, recommended to prior living arrangement once sodium stable off IVF.   Discharge Barriers: na level and PT eval  OTHER IMPORTANT INFO: interested in Meals on wheel, care coordinator aware.

## 2021-03-29 NOTE — PLAN OF CARE
Physical Therapy Discharge Summary    Reason for therapy discharge:    All goals and outcomes met, no further needs identified.    Progress towards therapy goal(s). See goals on Care Plan in [facility] electronic health record for goal details.  Goals met    Therapy recommendation(s):    Continued therapy is recommended.  Rationale/Recommendations:  eval and treat with home PT to progress strength and endurance. Recommend pt continue to walk with nursing in the halls while here for activity.

## 2021-03-29 NOTE — PROGRESS NOTES
[facility]    Hospitalist Progress Note      Assessment & Plan   Tanisha Muhammad is a 89 year old female who was admitted on 3/26/2021 with nausea and headache and found to have symptomatic acute hyponatremia.    Symptomatic acute hyponatremia  Na is 114, down from 137 on 3/4/2021. She was euvolemic on exam. She has dilute urine on UA with relatively low urine osmolality, raising suspicion for polydipsia as the cause (given that she may have drunk as much as a gallon of water today, and reports otherwise eating a balanced diet at home; low solute diet being on the differential). TSH WNL  -- q6h Na checks, AM FENA/urine osm  -- After IVF, Na came up to 125, then started to trend down in evening after monitoring off fluids  -- 2000ml fluid restriction started 3/29--decreased to 1500ml fluid restriction  -- add 1gram sodium chloride tabs TID  -- BMP in AM  -- if still low after above--will consult nephrology     Stage 3 CKD  Cr was 0.94 on admit and was 1.25 on 3/4/2021.  - Monitor off IVF     Accelerated hypertension  TTE in 2018 showed preserved LVEF with moderate MR.  - resumed PTA amlodipine 10mg daily, holding PTA losartan due to association with hyponatremia     Hypophosphatemia  Replete per protocol    Gout  PTA not on any medications.    Depression  Hold PTA lexapro given association with hyponatremia    COVID-19 screening-negative    DVT Prophylaxis: Pneumatic Compression Devices  Code Status: Full Code  Expected discharge: -23 days, recommended to prior living arrangement once sodium stable off IVF    Ramona Yanez, DO  Text Page (7am - 6pm)    Interval History   Patient seen and examined. She feels fine. Urinating frequently overnight again. Discussed worsening sodiums and plan for sodium addition and withholding fluid and she is agreeable. No complaints otherwise, she remains hopeful for discharge.    -Data reviewed today: I reviewed all new labs and imaging results over the last  24 hours. I personally reviewed no images or EKG's today.    Physical Exam   Temp: 97.2  F (36.2  C) Temp src: Oral BP: 111/47 Pulse: 83   Resp: 18 SpO2: 97 % O2 Device: None (Room air)    Vitals:    03/26/21 1808 03/28/21 0651   Weight: 70.3 kg (155 lb) 69.5 kg (153 lb 3.5 oz)     Vital Signs with Ranges  Temp:  [97.2  F (36.2  C)-97.9  F (36.6  C)] 97.2  F (36.2  C)  Pulse:  [75-83] 83  Resp:  [16-18] 18  BP: (111-150)/(47-70) 111/47  SpO2:  [97 %] 97 %  I/O last 3 completed shifts:  In: 360 [P.O.:360]  Out: 1600 [Urine:1600]    Constitutional: Awake, alert, cooperative, no apparent distress  Respiratory: Clear to auscultation bilaterally, no crackles or wheezing  Cardiovascular: Regular rate and rhythm, normal S1 and S2, and no murmur noted  GI: Normal bowel sounds, soft, non-distended, non-tender  Skin/Integumen: No rashes, no cyanosis, mild-mod chronic edema around left ankle, otherwise trace edema  Other: Slightly hard of hearing (better on right side)    Medications       amLODIPine  10 mg Oral Daily     sodium chloride (PF)  3 mL Intracatheter Q8H     sodium chloride  1 g Oral TID w/meals       Data   Recent Labs   Lab 03/29/21  1218 03/29/21  0606 03/29/21  0037 03/28/21  0608 03/28/21  0608 03/27/21  1418 03/27/21  1418 03/27/21  0651 03/27/21  0651 03/26/21 2035 03/26/21 2035   WBC  --   --   --   --  5.2  --   --   --  4.6  --  5.9   HGB  --   --   --   --  11.5*  --   --   --  10.7*  --  11.9   MCV  --   --   --   --  79  --   --   --  78  --  78   PLT  --   --   --   --  138*  --   --   --  117*  --  137*   * 124* 120*   < > 125*   < > 120*   < > 122*   < > 114*   POTASSIUM  --  4.1  --   --  4.1  --  4.2  --  3.9  --  4.0   CHLORIDE  --  96  --   --  96  --   --   --  94  --  83*   CO2  --  21  --   --  24  --   --   --  25  --  23   BUN  --  18  --   --  18  --   --   --  11  --  13   CR  --  1.10*  --   --  1.02  --   --   --  1.03  --  0.94   ANIONGAP  --  7  --   --  5  --   --   --  3  --   8   GALINA  --  9.3  --   --  9.1  --   --   --  8.8  --  9.1   GLC  --  106*  --   --  107*  --   --   --  121*  --  120*   TROPI  --   --   --   --   --   --   --   --   --   --  <0.015    < > = values in this interval not displayed.       No results found for this or any previous visit (from the past 24 hour(s)).

## 2021-03-30 ENCOUNTER — APPOINTMENT (OUTPATIENT)
Dept: OCCUPATIONAL THERAPY | Facility: CLINIC | Age: 86
DRG: 641 | End: 2021-03-30
Payer: MEDICARE

## 2021-03-30 LAB
ANION GAP SERPL CALCULATED.3IONS-SCNC: 6 MMOL/L (ref 3–14)
BUN SERPL-MCNC: 19 MG/DL (ref 7–30)
CALCIUM SERPL-MCNC: 9.1 MG/DL (ref 8.5–10.1)
CHLORIDE SERPL-SCNC: 101 MMOL/L (ref 94–109)
CO2 SERPL-SCNC: 22 MMOL/L (ref 20–32)
CREAT SERPL-MCNC: 1.11 MG/DL (ref 0.52–1.04)
CREAT UR-MCNC: 65 MG/DL
FRACT EXCRET NA UR+SERPL-RTO: 0.5 %
GFR SERPL CREATININE-BSD FRML MDRD: 42 ML/MIN/{1.73_M2}
GLUCOSE SERPL-MCNC: 91 MG/DL (ref 70–99)
OSMOLALITY UR: 277 MMOL/KG (ref 100–1200)
POTASSIUM SERPL-SCNC: 4.2 MMOL/L (ref 3.4–5.3)
SODIUM SERPL-SCNC: 128 MMOL/L (ref 133–144)
SODIUM SERPL-SCNC: 128 MMOL/L (ref 133–144)
SODIUM SERPL-SCNC: 129 MMOL/L (ref 133–144)
SODIUM SERPL-SCNC: 129 MMOL/L (ref 133–144)
SODIUM UR-SCNC: 34 MMOL/L

## 2021-03-30 PROCEDURE — 99222 1ST HOSP IP/OBS MODERATE 55: CPT | Performed by: INTERNAL MEDICINE

## 2021-03-30 PROCEDURE — 258N000003 HC RX IP 258 OP 636: Performed by: INTERNAL MEDICINE

## 2021-03-30 PROCEDURE — 36415 COLL VENOUS BLD VENIPUNCTURE: CPT | Performed by: HOSPITALIST

## 2021-03-30 PROCEDURE — 99232 SBSQ HOSP IP/OBS MODERATE 35: CPT | Performed by: HOSPITALIST

## 2021-03-30 PROCEDURE — 80048 BASIC METABOLIC PNL TOTAL CA: CPT | Performed by: HOSPITALIST

## 2021-03-30 PROCEDURE — 120N000001 HC R&B MED SURG/OB

## 2021-03-30 PROCEDURE — 97535 SELF CARE MNGMENT TRAINING: CPT | Mod: GO | Performed by: OCCUPATIONAL THERAPIST

## 2021-03-30 PROCEDURE — 99207 PR NO CHARGE LOS: CPT | Performed by: INTERNAL MEDICINE

## 2021-03-30 PROCEDURE — 84300 ASSAY OF URINE SODIUM: CPT | Performed by: HOSPITALIST

## 2021-03-30 PROCEDURE — 250N000013 HC RX MED GY IP 250 OP 250 PS 637: Performed by: HOSPITALIST

## 2021-03-30 PROCEDURE — 83935 ASSAY OF URINE OSMOLALITY: CPT | Performed by: HOSPITALIST

## 2021-03-30 PROCEDURE — 82570 ASSAY OF URINE CREATININE: CPT | Performed by: HOSPITALIST

## 2021-03-30 PROCEDURE — 84295 ASSAY OF SERUM SODIUM: CPT | Performed by: HOSPITALIST

## 2021-03-30 RX ORDER — LOSARTAN POTASSIUM 50 MG/1
50 TABLET ORAL DAILY
Status: DISCONTINUED | OUTPATIENT
Start: 2021-03-30 | End: 2021-03-30

## 2021-03-30 RX ORDER — LOSARTAN POTASSIUM 50 MG/1
50 TABLET ORAL DAILY
Status: DISCONTINUED | OUTPATIENT
Start: 2021-03-31 | End: 2021-03-31

## 2021-03-30 RX ADMIN — AMLODIPINE BESYLATE 10 MG: 10 TABLET ORAL at 08:47

## 2021-03-30 RX ADMIN — LOSARTAN POTASSIUM 50 MG: 50 TABLET, FILM COATED ORAL at 08:47

## 2021-03-30 RX ADMIN — POLYETHYLENE GLYCOL 3350 17 G: 17 POWDER, FOR SOLUTION ORAL at 11:38

## 2021-03-30 RX ADMIN — SODIUM CHLORIDE TAB 1 GM 1 G: 1 TAB at 18:01

## 2021-03-30 RX ADMIN — SODIUM CHLORIDE 1000 ML: 9 INJECTION, SOLUTION INTRAVENOUS at 12:58

## 2021-03-30 RX ADMIN — SODIUM CHLORIDE TAB 1 GM 1 G: 1 TAB at 08:47

## 2021-03-30 RX ADMIN — SODIUM CHLORIDE TAB 1 GM 1 G: 1 TAB at 12:19

## 2021-03-30 ASSESSMENT — ACTIVITIES OF DAILY LIVING (ADL)
ADLS_ACUITY_SCORE: 21
ADLS_ACUITY_SCORE: 20
ADLS_ACUITY_SCORE: 21
ADLS_ACUITY_SCORE: 20
ADLS_ACUITY_SCORE: 20
DEPENDENT_IADLS:: INDEPENDENT
ADLS_ACUITY_SCORE: 21

## 2021-03-30 ASSESSMENT — MIFFLIN-ST. JEOR: SCORE: 1095.1

## 2021-03-30 NOTE — CONSULTS
[facility]    Nephrology Consultation     Date of Admission:  3/26/2021    Assessment & Plan     Tanisha Muhammad is a 89 year old female who was admitted on 3/26/2021.     1) Hyponatremia:  Initially apparently due to polydipsia as he U osm was low. Initially it did auto correct but now it has reached a plateau.  Elizabeth is on the low side so perhaps she is a little dry ?    2) Normal TSH.    3) HTN:  OK to use loartan if needed.    Suggest:    Normal saline 100 mL/hour x 10 hours.  AM cortisol  U na and U osm in AM along with BMP.      Charanjit Sandoval MD  [facility] - Nephrology  [phone]    Reason for Consult     I was asked to see the patient for hyponatremia.      Primary Care Physician     Sesar Cuevas    Chief Complaint     Hyponatremia    History is obtained from the patient and chart review.      History of Present Illness     Tanisha Muhammad is a 89 year old female who presents with hyponatremia.    She was originally admitted 3/26/21 via ED after presenting with palpitations, nausea, fatigue, weakness, malaise.      Her sodium was 114.    U osm was 104.  P osm was 240.    Apparently she had been drinking as much as a gallon of water a day in the days leading up to admission.  This has not been her usual habit.  She cannot explain why she increased her fluid intake.    Her sodium began to correct without any specific intervention.  120 after ~4 hours.    So she was given some D5W at 50 mL/hour to slow the correction.  Her sodium went as high as 125 and then down to 120.    Since the D5W has been off her sodium has been in the range of 128-129.      U osm 277  U na 34    She feels fine.  Wants to go home.    Has FR of 1500 mL.    TSH is normal.    She had been on torsemide and Lexapro as outpt PTA.        Past Medical History   I have reviewed this patient's medical history and updated it with pertinent information if needed.   Past Medical History:   Diagnosis Date      Cystocele 03/2010    midline     Elevated glucose      Gout 03/2010     [place]'s disease 2010     Inactive Ménière's disease     Left ear deafness     Post-menopausal bleeding 01/02/2014    possibly due to pessary, area of irritated skin visualized on exam with active light bleeding posterior to cervix. Will need bx if continues following estrogen cream and leaving pessary out for 3-4 weeks.      Rectocele      Trigeminal neuralgia 2015     Unspecified essential hypertension      Unspecified venous (peripheral) insufficiency        Past Surgical History   I have reviewed this patient's surgical history and updated it with pertinent information if needed.  Past Surgical History:   Procedure Laterality Date     C DENTAL CONSULTATION      Dr Kahlil CEJA EYE EXAM, EST PATIENT,COMPREHESV      DR white     CATARACT IOL, RT/LT      Left     LASER YAG CAPSULOTOMY Left 10/2/2015    Procedure: LASER YAG CAPSULOTOMY;  Surgeon: Pieter Rios MD;  Location: [facility]       Prior to Admission Medications   Prior to Admission Medications   Prescriptions Last Dose Informant Patient Reported? Taking?   amLODIPine (NORVASC) 10 MG tablet 3/26/2021 at am Self No Yes   Sig: Take 1 tablet (10 mg) by mouth daily   escitalopram (LEXAPRO) 5 MG tablet has not started Self No No   Sig: Take 1 tablet (5 mg) by mouth daily   estradiol (ESTRACE VAGINAL) 0.1 MG/GM vaginal cream  at prn Self No Yes   Sig: Place 1 g vaginally twice a week Place 1g in vagina twice weekly   Patient taking differently: Place 1 g vaginally as needed Place 1g in vagina twice weekly PRN   losartan (COZAAR) 100 MG tablet 3/26/2021 at am Self No Yes   Sig: TAKE 1 TABLET(100 MG) BY MOUTH DAILY   multivitamin, therapeutic (THERA-VIT) TABS tablet Past Week at Unknown time Self Yes Yes   Sig: Take 1 tablet by mouth daily   torsemide (DEMADEX) 5 MG tablet 3/26/2021 at am Self No Yes   Sig: Take 1 tablet (5 mg) by mouth daily Take in AM      Facility-Administered  Medications: None     Allergies   Allergies   Allergen Reactions     Indomethacin      Cognitive impairment     Keflex [Cephalexin]      HIVES     Sulfamethoxazole-Trimethoprim      BACTRIM        Social History   I have reviewed this patient's social history and updated it with pertinent information if needed. Tanisha Muhammad  reports that she has never smoked. She has never used smokeless tobacco. She reports current alcohol use. She reports that she does not use drugs.    Family History   I have reviewed this patient's family history and updated it with pertinent information if needed.   Family History   Problem Relation Age of Onset     Cancer Mother         kidney     Hypertension Mother      Heart Disease Father      Respiratory Father         emphysema     Heart Disease Brother         MI     Arthritis Sister         fibromyalgia     Heart Disease Brother         stent 4     Cancer Brother         renal        Review of Systems   The 10 point Review of Systems is negative other than noted in the HPI.     Physical Exam   Temp: 98.3  F (36.8  C) Temp src: Oral BP: (!) 144/53 Pulse: 77   Resp: 18 SpO2: 94 % O2 Device: None (Room air)    Vital Signs with Ranges  Temp:  [97.2  F (36.2  C)-98.3  F (36.8  C)] 98.3  F (36.8  C)  Pulse:  [77-85] 77  Resp:  [16-18] 18  BP: (111-145)/(47-56) 144/53  SpO2:  [94 %-97 %] 94 %  158 lbs 4.8 oz    GENERAL: healthy, alert, NAD, sitting up in chair eating lunch  HEENT:  Normocephalic. No gross abnormalities.  Pupils equal.  MMM.  Dentition is fair. Very [place].   CV: RRR, no murmurs, no clicks, gallops, or rubs, no edema, no carotid bruits  RESP: Clear bilaterally with good efforts  GI: Abdomen soft/nt/nd, BS normal. No masses, organomegaly  MUSCULOSKELETAL: extremities nl - no gross deformities noted  SKIN: no suspicious lesions or rashes, dry to touch  NEURO:  Strength normal and symmetric.   PSYCH: mood good, affect appropriate  LYMPH: No palpable ant/post cervical and  supraclavicular adenopathy    Data   BMP  Recent Labs   Lab 03/30/21  1140 03/30/21  0600 03/30/21  0002 03/29/21 2004 03/29/21 0606 03/29/21 0606 03/28/21  0608 03/28/21  0608 03/27/21  1418 03/27/21  1418 03/27/21  0651 03/27/21  0651   * 129* 128* 125*   < > 124*   < > 125*   < > 120*   < > 122*   POTASSIUM  --  4.2  --   --   --  4.1  --  4.1  --  4.2  --  3.9   CHLORIDE  --  101  --   --   --  96  --  96  --   --   --  94   GALINA  --  9.1  --   --   --  9.3  --  9.1  --   --   --  8.8   CO2  --  22  --   --   --  21  --  24  --   --   --  25   BUN  --  19  --   --   --  18  --  18  --   --   --  11   CR  --  1.11*  --   --   --  1.10*  --  1.02  --   --   --  1.03   GLC  --  91  --   --   --  106*  --  107*  --   --   --  121*    < > = values in this interval not displayed.     [email or web address](phos:4)  CBC)  Recent Labs   Lab 03/28/21  0608 03/27/21  0651 03/26/21 2035   WBC 5.2 4.6 5.9   HGB 11.5* 10.7* 11.9   HCT 31.1* 28.6* 32.2*   MCV 79 78 78   * 117* 137*

## 2021-03-30 NOTE — PROGRESS NOTES
MD Notification    Notified Person: MD    Notified Person Name: Sandra Garcia    Notification Date/Time: 3/29/21 2040    Notification Interaction: Page    Purpose of Notification: q6 Na checks, went from 120 to 125. Thanks!    Orders Received: No interventions at this time, continue q6 checks.      Comments:

## 2021-03-30 NOTE — PLAN OF CARE
DATE & TIME: 03/29/21 1900-0730  Cognitive Concerns/ Orientation : A&Ox4, forgetful. [place]  BEHAVIOR & AGGRESSION TOOL COLOR: green   CIWA SCORE: NA   ABNL VS/O2: VSS on RA  MOBILITY: SBA with GB/WK. Pt felt she was not as strong as prior to admission. PT following.   PAIN MANAGMENT: denied pain.   DIET: regular, good appetite.   BOWEL/BLADDER: inc of urine.   ABNL LAB/BG: q6 Na checks: Na 120-> 125-> 128. MD aware.   DRAIN/DEVICES: PIV on left arm SL  TELEMETRY RHYTHM: SR with 1st degree AVB.   SKIN: intact.   TESTS/PROCEDURES: Na Q 6.   D/C DATE:1-2 days, recommended to prior living arrangement once sodium stable off IVF.   Discharge Barriers: Na level   OTHER IMPORTANT INFO: PT/OT following.

## 2021-03-30 NOTE — PROGRESS NOTES
[facility]    Hospitalist Progress Note      Assessment & Plan   Tanisha Muhammad is a 89 year old female who was admitted on 3/26/2021 with nausea and headache and found to have symptomatic acute hyponatremia.    Symptomatic acute hyponatremia  Na is 114, down from 137 on 3/4/2021. She was euvolemic on exam. She has dilute urine on UA with relatively low urine osmolality, raising suspicion for polydipsia as the cause (given that she may have drunk as much as a gallon of water today, and reports otherwise eating a balanced diet at home; low solute diet being on the differential). TSH WNL.   * After IVF, Na came up to 125, then started to trend down in evening after monitoring off fluids 3/28  3/30: FENA low at 0.5, Urine   -- q6h Na checks  -- 1500ml fluid restriction and 1gram sodium chloride tabs TID added 3/29  -- Na plateau 128-129 range  -- give 1L IVF NS (as FENA is now low)  -- BMP in AM  -- morning cortisol, Fiona, Uosm in AM  -- appreciate nephrology input     Stage 3 CKD  Cr was 0.94 on admit and was 1.25 on 3/4/2021.  - Give IVF one liter  - BMP in AM     Accelerated hypertension  TTE in 2018 showed preserved LVEF with moderate MR.  - resumed PTA amlodipine 10mg daily  - resumed reduced dose losartan 50mg 3/30-trend BPs, increase back to PTA 100mg dosing as BPs need  - hold PTA torsemide     Hypophosphatemia  Repleted per protocol    Gout  PTA not on any medications.    Depression  Hold PTA lexapro given association with hyponatremia    COVID-19 screening-negative    DVT Prophylaxis: Pneumatic Compression Devices  Code Status: Full Code  Expected discharge: 2 days, recommended to prior living arrangement once sodium stable    Ramona Yanez, DO  Text Page (7am - 6pm)    Interval History   Patient seen and examined. She feels fine. No chest pain, shortness of breath, fevers, chills, abdominal pain, nausea, headache. Discussed sodiums and need to keep her here another day or  two. Updated nephmirza Ion via phone.  Discussed care plan with nephrology    -Data reviewed today: I reviewed all new labs and imaging results over the last 24 hours. I personally reviewed no images or EKG's today.    Physical Exam   Temp: 98.3  F (36.8  C) Temp src: Oral BP: (!) 144/53 Pulse: 77   Resp: 18 SpO2: 94 % O2 Device: None (Room air)    Vitals:    03/26/21 1808 03/28/21 0651 03/30/21 0556   Weight: 70.3 kg (155 lb) 69.5 kg (153 lb 3.5 oz) 71.8 kg (158 lb 4.8 oz)     Vital Signs with Ranges  Temp:  [97.2  F (36.2  C)-98.3  F (36.8  C)] 98.3  F (36.8  C)  Pulse:  [77-85] 77  Resp:  [16-18] 18  BP: (111-145)/(47-56) 144/53  SpO2:  [94 %-97 %] 94 %  I/O last 3 completed shifts:  In: 900 [P.O.:900]  Out: 625 [Urine:625]    Constitutional: Awake, alert, cooperative, no apparent distress  Respiratory: Clear to auscultation bilaterally, no crackles or wheezing  Cardiovascular: Regular rate and rhythm, normal S1 and S2, and no murmur noted  GI: Normal bowel sounds, soft, non-distended, non-tender  Skin/Integumen: No rashes, no cyanosis, mild-mod chronic edema around left ankle, otherwise trace edema  Other: Slightly hard of hearing (better on right side)    Medications       sodium chloride 0.9%  1,000 mL Intravenous Once     amLODIPine  10 mg Oral Daily     [START ON 3/31/2021] losartan  50 mg Oral Daily     sodium chloride (PF)  3 mL Intracatheter Q8H     sodium chloride  1 g Oral TID w/meals       Data   Recent Labs   Lab 03/30/21  1140 03/30/21  0600 03/30/21  0002 03/29/21  0606 03/29/21  0606 03/28/21  0608 03/28/21  0608 03/27/21  0651 03/27/21  0651 03/26/21 2035 03/26/21 2035   WBC  --   --   --   --   --   --  5.2  --  4.6  --  5.9   HGB  --   --   --   --   --   --  11.5*  --  10.7*  --  11.9   MCV  --   --   --   --   --   --  79  --  78  --  78   PLT  --   --   --   --   --   --  138*  --  117*  --  137*   * 129* 128*   < > 124*   < > 125*   < > 122*   < > 114*   POTASSIUM  --  4.2  --   --   4.1  --  4.1   < > 3.9  --  4.0   CHLORIDE  --  101  --   --  96  --  96  --  94  --  83*   CO2  --  22  --   --  21  --  24  --  25  --  23   BUN  --  19  --   --  18  --  18  --  11  --  13   CR  --  1.11*  --   --  1.10*  --  1.02  --  1.03  --  0.94   ANIONGAP  --  6  --   --  7  --  5  --  3  --  8   GALINA  --  9.1  --   --  9.3  --  9.1  --  8.8  --  9.1   GLC  --  91  --   --  106*  --  107*  --  121*  --  120*   TROPI  --   --   --   --   --   --   --   --   --   --  <0.015    < > = values in this interval not displayed.       No results found for this or any previous visit (from the past 24 hour(s)).

## 2021-03-30 NOTE — PROGRESS NOTES
Spoke w/  regarding timed 1800 sodium draw; phlebotomists are finishing up 1700 draws and will be up as soon as they are able.

## 2021-03-30 NOTE — PLAN OF CARE
: 03/29/21 07-19  Cognitive Concerns/ Orientation : A&Ox4, forgetful. [place]  BEHAVIOR & AGGRESSION TOOL COLOR: green   CIWA SCORE: NA   ABNL VS/O2: VSS on RA  MOBILITY: SBA with GB/WK. Pt felt she was not as strong as prior to admission. PT following.   PAIN MANAGMENT: denied pain.   DIET: regular, good appetite.   BOWEL/BLADDER: inc of urine.   ABNL LAB/BG: q6 Na checks: Na 129 and 128. MD aware.   DRAIN/DEVICES: PIV on left arm SL  TELEMETRY RHYTHM: SR with 1st degree AVB.   SKIN: intact.   TESTS/PROCEDURES: Na Q 6.   D/C DATE:1-2 days, recommended to prior living arrangement once sodium stable off IVF.   Discharge Barriers: Na level   OTHER IMPORTANT INFO: PT/OT following. Fluid restriction  to 1200cc, on salt tablets with meals and started NS bolus.

## 2021-03-30 NOTE — PLAN OF CARE
A&Ox4, forgetful and [place]. Up in chair for dinner. Enjoyed her nephew's, Ion, company. Administered sodium chloride tablet. Sodium check at noon was 120, 1800 sodium recheck pending, Na checks q 6 hrs. Up SBA w/ GB and walker. Incontinent. PCDs and Jacques stockings on. Pramod reg diet; 1500 mL fluid restriction. Denies pain. Tele: SR w/ 1st degree AVB. IV SL. Discharge plan pending Na stability.

## 2021-03-30 NOTE — PROGRESS NOTES
Hospitalist service cross-cover note:     Paged regarding sodium 120->125->128. Has been in 120-125 range for past 48 hours, and 124 ~18 hours prior. Not currently on IVF. No intervention needed at this time, continue to monitor.     Florentin Hinojosa MD   Hospitalist  [phone]

## 2021-03-30 NOTE — CONSULTS
Care Management Initial Consult  Pt resides in a condo alone.  Therapies are recommending home therapies.  This was discussed with pt.  She was in agreement until homebound status was explained.  Pt intends to drive to the grocery store if she needs anything and other errands as she needs.  Pt reports she does not feel weaker than her baseline.  Also discussed having outpt therapies.  Pt wants to wait on this being ordered.  She intends to hire a lady that rents one of her condos, to come in and do light housekeeping a few hours per week.  She will discuss this with Dr Cuevas when she sees him next week.  Pt is interested in Meals on Wheels.  Discussed with SW that recommends pt calls [employer].  Pt was given the [employer] booklet.  Pt's nephew will be providing transportation home.  Per pt report, her nephew works until 4 pm.    Assessment completed with: Patient,    Type of CM/SW Visit: Initial Assessment    Primary Care Provider verified and updated as needed: Yes   Readmission within the last 30 days: no previous admission in last 30 days      Reason for Consult: discharge planning  Advance Care Planning: Advance Care Planning Reviewed: no concerns identified  Pt reports she has a HCD       Communication Assessment  Patient's communication style: spoken language (English or Bilingual)    Hearing Difficulty or Deaf: yes   Wear Glasses or Blind: no    Cognitive  Cognitive/Neuro/Behavioral: WDL  Level of Consciousness: alert  Arousal Level: opens eyes spontaneously  Orientation: oriented x 4  Mood/Behavior: calm, cooperative  Best Language: 0 - No aphasia  Speech: clear    Living Environment:   People in home: alone     Current living Arrangements: condominium      Able to return to prior arrangements: yes  Her nephew lives in a condo close to pt's condo     Family/Social Support:  Care provided by: self  Provides care for: no one  Marital Status:   Other (specify)(Nephew)          Description of Support  System: Supportive         Current Resources:   Patient receiving home care services: No     Community Resources:    Equipment currently used at home: none( has a high toilet, grab bars in shower, shower chair, FWW)  Supplies currently used at home:  NA    Employment/Financial:  Employment Status: retired, other (see comments)     Employment/ Comments: 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  Financial Concerns: No concerns identified           Lifestyle & Psychosocial Needs:        Socioeconomic History     Marital status:      Spouse name: Not on file     Number of children: 1     Years of education: 15     Highest education level: Not on file   Occupational History     Occupation: [employer]     Tobacco Use     Smoking status: Never Smoker     Smokeless tobacco: Never Used   Substance and Sexual Activity     Alcohol use: Yes     Alcohol/week: 0.0 standard drinks     Comment: one glass wine a month     Drug use: No     Sexual activity: Not Currently     Partners: Male     Birth control/protection: Post-menopausal       Functional Status:  Prior to admission patient needed assistance:   Dependent ADLs:: Independent  Dependent IADLs:: Independent       Mental Health Status:  Mental Health Status: No Current Concerns       Chemical Dependency Status:  Chemical Dependency Status: No Current Concerns             Values/Beliefs:  Spiritual, Cultural Beliefs, Roman Catholic Practices, Values that affect care:  No                 Lesley Hurtado RN   Inpatient Care Management  [phone]

## 2021-03-30 NOTE — PROGRESS NOTES
MD Notification    Notified Person: MD    Notified Person Name:  Ashish    Notification Date/Time: 3/30/21 0056    Notification Interaction: Phone conversation     Purpose of Notification: q6 Hgb checks trending up 120-> 125-> 128.     Orders Received: No new orders. Recheck 0600.    Comments:

## 2021-03-31 ENCOUNTER — APPOINTMENT (OUTPATIENT)
Dept: OCCUPATIONAL THERAPY | Facility: CLINIC | Age: 86
DRG: 641 | End: 2021-03-31
Payer: MEDICARE

## 2021-03-31 VITALS
WEIGHT: 156.3 LBS | HEIGHT: 64 IN | OXYGEN SATURATION: 96 % | SYSTOLIC BLOOD PRESSURE: 146 MMHG | DIASTOLIC BLOOD PRESSURE: 66 MMHG | RESPIRATION RATE: 18 BRPM | TEMPERATURE: 98.8 F | BODY MASS INDEX: 26.69 KG/M2 | HEART RATE: 90 BPM

## 2021-03-31 LAB
ANION GAP SERPL CALCULATED.3IONS-SCNC: 5 MMOL/L (ref 3–14)
BUN SERPL-MCNC: 20 MG/DL (ref 7–30)
CALCIUM SERPL-MCNC: 9.1 MG/DL (ref 8.5–10.1)
CHLORIDE SERPL-SCNC: 105 MMOL/L (ref 94–109)
CO2 SERPL-SCNC: 22 MMOL/L (ref 20–32)
CORTIS SERPL-MCNC: 12.5 UG/DL (ref 4–22)
CREAT SERPL-MCNC: 1.07 MG/DL (ref 0.52–1.04)
GFR SERPL CREATININE-BSD FRML MDRD: 44 ML/MIN/{1.73_M2}
GLUCOSE SERPL-MCNC: 101 MG/DL (ref 70–99)
OSMOLALITY UR: 310 MMOL/KG (ref 100–1200)
POTASSIUM SERPL-SCNC: 4.2 MMOL/L (ref 3.4–5.3)
SODIUM SERPL-SCNC: 131 MMOL/L (ref 133–144)
SODIUM SERPL-SCNC: 132 MMOL/L (ref 133–144)
SODIUM SERPL-SCNC: 132 MMOL/L (ref 133–144)
SODIUM UR-SCNC: 70 MMOL/L

## 2021-03-31 PROCEDURE — 36415 COLL VENOUS BLD VENIPUNCTURE: CPT | Performed by: HOSPITALIST

## 2021-03-31 PROCEDURE — 250N000013 HC RX MED GY IP 250 OP 250 PS 637: Performed by: HOSPITALIST

## 2021-03-31 PROCEDURE — 80048 BASIC METABOLIC PNL TOTAL CA: CPT | Performed by: HOSPITALIST

## 2021-03-31 PROCEDURE — 84300 ASSAY OF URINE SODIUM: CPT | Performed by: INTERNAL MEDICINE

## 2021-03-31 PROCEDURE — 83935 ASSAY OF URINE OSMOLALITY: CPT | Performed by: INTERNAL MEDICINE

## 2021-03-31 PROCEDURE — 99238 HOSP IP/OBS DSCHRG MGMT 30/<: CPT | Performed by: INTERNAL MEDICINE

## 2021-03-31 PROCEDURE — 250N000013 HC RX MED GY IP 250 OP 250 PS 637: Performed by: INTERNAL MEDICINE

## 2021-03-31 PROCEDURE — 84295 ASSAY OF SERUM SODIUM: CPT | Performed by: HOSPITALIST

## 2021-03-31 PROCEDURE — 97535 SELF CARE MNGMENT TRAINING: CPT | Mod: GO | Performed by: OCCUPATIONAL THERAPIST

## 2021-03-31 PROCEDURE — 99232 SBSQ HOSP IP/OBS MODERATE 35: CPT | Performed by: INTERNAL MEDICINE

## 2021-03-31 PROCEDURE — 82533 TOTAL CORTISOL: CPT | Performed by: HOSPITALIST

## 2021-03-31 RX ORDER — LOSARTAN POTASSIUM 50 MG/1
100 TABLET ORAL DAILY
Status: DISCONTINUED | OUTPATIENT
Start: 2021-04-01 | End: 2021-03-31 | Stop reason: HOSPADM

## 2021-03-31 RX ORDER — LOSARTAN POTASSIUM 50 MG/1
50 TABLET ORAL ONCE
Status: COMPLETED | OUTPATIENT
Start: 2021-03-31 | End: 2021-03-31

## 2021-03-31 RX ADMIN — SODIUM CHLORIDE TAB 1 GM 1 G: 1 TAB at 09:02

## 2021-03-31 RX ADMIN — LOSARTAN POTASSIUM 50 MG: 50 TABLET, FILM COATED ORAL at 09:02

## 2021-03-31 RX ADMIN — LOSARTAN POTASSIUM 50 MG: 50 TABLET, FILM COATED ORAL at 13:47

## 2021-03-31 RX ADMIN — POLYETHYLENE GLYCOL 3350 17 G: 17 POWDER, FOR SOLUTION ORAL at 06:07

## 2021-03-31 RX ADMIN — AMLODIPINE BESYLATE 10 MG: 10 TABLET ORAL at 09:02

## 2021-03-31 ASSESSMENT — MIFFLIN-ST. JEOR: SCORE: 1086.03

## 2021-03-31 ASSESSMENT — ACTIVITIES OF DAILY LIVING (ADL)
ADLS_ACUITY_SCORE: 20
ADLS_ACUITY_SCORE: 21
ADLS_ACUITY_SCORE: 20
ADLS_ACUITY_SCORE: 20
ADLS_ACUITY_SCORE: 21

## 2021-03-31 NOTE — PROGRESS NOTES
MD Notification    Notified Person: MD    Notified Person Name:  Keeganmikey    Notification Date/Time: 3/31/21 0046    Notification Interaction: Page    Purpose of Notification: q6 Na checks. Na increased from 129 to 132.     Orders Received: No changes necessary per MD.     Comments:

## 2021-03-31 NOTE — PROGRESS NOTES
Care Management Discharge Note    Discharge Date: 03/31/21  Expected Time of Departure: Nephew cannot provide transportation until 4PM    Discharge Disposition: Home    Discharge Services: Meals on Wheels, pt was given information    Discharge DME: None(Pt has a walker at home)    Discharge Transportation: family or friend will provide    Private pay costs discussed: Not applicable    PAS Confirmation Code:NA    Patient/family educated on Medicare website which has current facility and service quality ratings: yes    Education Provided on the Discharge Plan:  yes  Persons Notified of Discharge Plans: Pt, Nsg  Patient/Family in Agreement with the Plan:  yes    Handoff Referral Completed: No    Additional Information:  Pt does not plan to be homebound, so will not be eligible for homecare.  If she decides that she needs this once she gets home, she will discuss with Dr Cuevas..    PCP follow up scheduled for 4/8/21 with Dr Cuevas at [facility]      Lesley Hurtado RN   Inpatient Care Management  [phone]

## 2021-03-31 NOTE — PLAN OF CARE
Discharge    Patient discharged to home via w/c  with nephew Ion  Care plan note: Pt admitted with hyponatremia. Instructed on fluid restriction 1200cc/day. Pt alert and oriented, forgetful with some stm loss. Pt denies any pain. Pt c/o heart racing and pounding-vss, heart rate 88, tele is sr with 1st degree AV block. (unchanged from this am.) Tolerating diet. Constipation resolved with miralax. AVS reviewed using teach back with pt and nephew..    Listed belongings gathered and returned to patient. Yes  Belongings returned to patient from security/pharmacy n/a  Care Plan and Patient education resolved: Yes  Prescriptions if needed, hard copies sent with patient  NA  Home and hospital acquired medications returned to patient: NA  Medication Bin checked and emptied on discharge Yes  Follow up appointment made for patient: Yes

## 2021-03-31 NOTE — DISCHARGE SUMMARY
[facility]  Hospitalist Discharge Summary      Date of Admission:  3/26/2021  Date of Discharge:  3/31/2021  Discharging Provider: Otoniel Painting MD      Discharge Diagnoses   Symptomatic acute hyponatremia  Stage III CKD  Accelerated hypertension    Follow-ups Needed After Discharge   Follow-up Appointments     Follow-up and recommended labs and tests       Follow up with primary care provider, Sesar Cuevas, as scheduled for   you on Thursday 4/8/21 at 1:00 pm, for hospital follow- up. The following   blood tests are recommended: Basic metabolic panel.             Unresulted Labs Ordered in the Past 30 Days of this Admission     No orders found from 2/24/2021 to 3/27/2021.          Discharge Disposition   Discharged to home  Condition at discharge: Stable      Hospital Course   Tanisha Muhammad is a 89 year old female who was admitted on 3/26/2021 with nausea and headache and found to have symptomatic acute hyponatremia.     Symptomatic acute hyponatremia  Na is 114, down from 137 on 3/4/2021. She was euvolemic on exam. She has dilute urine on UA with relatively low urine osmolality, raising suspicion for polydipsia as the cause (given that she may have drunk as much as a gallon of water on day of admission, and reports otherwise eating a balanced diet at home; low solute diet being on the differential). TSH WNL. After IVF, Na came up to 125, then started to trend down in evening after monitoring off fluids 3/28.  3/30: FENA low at 0.5, Urine . Seen by nephrology, received IVF with improving sodium. Morning cortisol normal. Discussed with nephrology. Stopped salt tablets  - continue with her PTA Losartan and Torsemide  - advised her to stop Lexapro (patient states she does not take it and does not need it)  - follow up BMP with PCP in a week     Stage 3 CKD  Cr was 0.94 on admit and was 1.25 on 3/4/2021.improved to 1.07.      Accelerated hypertension-improved  TTE in 2018  showed preserved LVEF with moderate MR.  - resumed PTA amlodipine 10mg daily  - resumed PTA 100mg Losartan and Torsemide resumed at discharge    Hypophosphatemia  Repleted per protocol     Gout  PTA not on any medications.     Depression  Stopped Lexapro due to hyponatremia.      COVID-19 screening-negative    NephewIon, updated by phone regarding discharge plans    Consultations This Hospital Stay   CARE MANAGEMENT / SOCIAL WORK IP CONSULT  PHYSICAL THERAPY ADULT IP CONSULT  OCCUPATIONAL THERAPY ADULT IP CONSULT  NEPHROLOGY IP CONSULT    Code Status   Full Code    Time Spent on this Encounter   I, Otoniel Painting MD, personally saw the patient today and spent 25 minutes discharging this patient.       Otoniel Painting MD  [place] [address] [facility]  [address] [place] [address]  Phone: [phone]  ______________________________________________________________________    Physical Exam   Vital Signs: Temp: 98.8  F (37.1  C) Temp src: Oral BP: (!) 148/70 Pulse: 86   Resp: 18 SpO2: 96 % O2 Device: None (Room air)    Weight: 156 lbs 4.8 oz  General Appearance: Alert, awake and no apparent distress  Respiratory: clear to auscultation bilaterally, no wheezing  Cardiovascular: regular rate and rhythm  GI: soft and non-tender  Skin: warm and dry         Primary Care Physician   Sesar Cuevas    Discharge Orders      Follow-up and recommended labs and tests     Follow up with primary care provider, Sesar Cuevas, as scheduled for you on Thursday 4/8/21 at 1:00 pm, for hospital follow- up. The following blood tests are recommended: Basic metabolic panel.     Reason for your hospital stay    You were admitted to the hospital due to low sodium level.     Activity    Your activity upon discharge: activity as tolerated     Full Code     Diet    Follow this diet upon discharge: Orders Placed This Encounter      Fluid restriction 1200 ML FLUID      Combination Diet Regular  Diet Adult       Significant Results and Procedures   Most Recent 3 CBC's:  Recent Labs   Lab Test 03/28/21  0608 03/27/21  0651 03/26/21 2035   WBC 5.2 4.6 5.9   HGB 11.5* 10.7* 11.9   MCV 79 78 78   * 117* 137*     Most Recent 3 BMP's:  Recent Labs   Lab Test 03/31/21  1135 03/31/21  0533 03/31/21  0013 03/30/21  0600 03/30/21  0600 03/29/21  0606 03/29/21  0606   * 132* 132*   < > 129*   < > 124*   POTASSIUM  --  4.2  --   --  4.2  --  4.1   CHLORIDE  --  105  --   --  101  --  96   CO2  --  22  --   --  22  --  21   BUN  --  20  --   --  19  --  18   CR  --  1.07*  --   --  1.11*  --  1.10*   ANIONGAP  --  5  --   --  6  --  7   GALINA  --  9.1  --   --  9.1  --  9.3   GLC  --  101*  --   --  91  --  106*    < > = values in this interval not displayed.   ,   Results for orders placed or performed during the hospital encounter of 02/04/21   CT Head w/o Contrast    Narrative    EXAM: CT HEAD W/O CONTRAST  LOCATION: [facility]  DATE/TIME: 2/4/2021 2:27 AM    INDICATION: Headache, intracranial hemorrhage suspected  COMPARISON: None  TECHNIQUE: Routine CT Head without IV contrast. Multiplanar reformats. Dose reduction techniques were used.    FINDINGS:  INTRACRANIAL CONTENTS: No intracranial hemorrhage, extraaxial collection, or mass effect.  No CT evidence of acute infarct. Mild to moderate presumed chronic small vessel ischemic changes. 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 Mild to moderate generalized volume loss. No hydrocephalus. Chronic lacunar infarcts bilateral cerebellar hemispheres.     VISUALIZED ORBITS/SINUSES/MASTOIDS: No intraorbital abnormality. No paranasal sinus mucosal disease. No middle ear or mastoid effusion.    BONES/SOFT TISSUES: No acute abnormality.      Impression    IMPRESSION:  1.  No acute intracranial process.       Discharge Medications   Current Discharge Medication List      CONTINUE these medications which have NOT CHANGED     Details   amLODIPine (NORVASC) 10 MG tablet Take 1 tablet (10 mg) by mouth daily  Qty: 90 tablet, Refills: 3    Associated Diagnoses: Essential hypertension, benign      estradiol (ESTRACE VAGINAL) 0.1 MG/GM vaginal cream Place 1 g vaginally twice a week Place 1g in vagina twice weekly  Qty: 42.5 g, Refills: 3    Associated Diagnoses: Urinary urgency      losartan (COZAAR) 100 MG tablet TAKE 1 TABLET(100 MG) BY MOUTH DAILY  Qty: 90 tablet, Refills: 3    Comments: **Patient requests 90 days supply**  Associated Diagnoses: Essential hypertension, benign      multivitamin, therapeutic (THERA-VIT) TABS tablet Take 1 tablet by mouth daily      torsemide (DEMADEX) 5 MG tablet Take 1 tablet (5 mg) by mouth daily Take in AM  Qty: 90 tablet, Refills: 3    Associated Diagnoses: Essential hypertension, benign         STOP taking these medications       escitalopram (LEXAPRO) 5 MG tablet Comments:   Reason for Stopping:             Allergies   Allergies   Allergen Reactions     Indomethacin      Cognitive impairment     Keflex [Cephalexin]      HIVES     Sulfamethoxazole-Trimethoprim      BACTRIM

## 2021-03-31 NOTE — PROGRESS NOTES
"SPIRITUAL HEALTH SERVICES  SPIRITUAL ASSESSMENT Progress Note  [facility]     REFERRAL SOURCE: Length of Stay    Reviewed documentation. Reflective conversation shared with Jelly which integrated elements of illness and family narratives.     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  Jelly shared regarding themes of grief with the death of her only daughter and the loss of \"connection with my siblings.\"  She expressed her gratitude for a her nephew and how \"blessed I am to have him and his support.\"    I offered spiritual and emotional support through reflective listening that affirmed emotions, experience, and meaning.     PLAN: Nothing further at this time due to impending discharge.    Ofe Weir  Associate   Pager [phone]  Phone [phone]    "

## 2021-03-31 NOTE — PROGRESS NOTES
Hospitalist service cross-cover note:     Paged regarding sodium trend. Reviewed. No changes necessary.     Florentin Hinojosa MD   Hospitalist  [phone]

## 2021-03-31 NOTE — PROGRESS NOTES
[facility]    Nephrology Progress Note     Assessment & Plan     Tanisha Muhammad is a 89 year old female who was admitted on 3/26/2021.      1) Hyponatremia:  Initially apparently due to polydipsia as he U osm was low. Initially it did auto correct but now it has reached a plateau.  Elizabeth is on the low side so perhaps she is a little dry ?    Better after 1 liter NS.    I think the original problem was excess water intake.  She needs to moderate her water intake.     2) Normal TSH.     3) HTN:  OK to use loartan if needed.     Suggest:     OK for discharge  Keep in torsemide 5 mg, amlodipine 10 mg and losartan 100 mg  She will need follow up with Dr. Cuevas next week with a BMP.          Charanjit Sandoval MD  [facility] - Nephrology  [phone]    Interval History     Feels fine.  Wants to go home.  Na up to 132 after 1 liter of saline.    AM cortisol 12.5    I reviewed her med history.  She started torsemide 5 mg 2/11/21.    Na after that was fine - 138 on 2/25 and 137 on 3/4.  She was advised to start Lexapro 5 mg on 3/11 but she tells me she never started it.      She did read somewhere that she needs to drink a lot of water.  So she did start drinking a lot of water in the days prior to admission.      Physical Exam   Temp: 98.8  F (37.1  C) Temp src: Oral BP: (!) 148/70 Pulse: 86   Resp: 18 SpO2: 96 % O2 Device: None (Room air)    Vitals:    03/28/21 0651 03/30/21 0556 03/31/21 0454   Weight: 69.5 kg (153 lb 3.5 oz) 71.8 kg (158 lb 4.8 oz) 70.9 kg (156 lb 4.8 oz)     Vital Signs with Ranges  Temp:  [97.9  F (36.6  C)-98.8  F (37.1  C)] 98.8  F (37.1  C)  Pulse:  [] 86  Resp:  [18] 18  BP: (136-158)/(61-70) 148/70  SpO2:  [95 %-98 %] 96 %  I/O last 3 completed shifts:  In: 1040 [P.O.:1040]  Out: 1000 [Urine:1000]    GENERAL APPEARANCE: pleasant, NAD, a & o  HEENT:  [place]  RESP: lungs cta b c good efforts, no crackles, rhonchi or wheezes  CV: RRR, nl S1/S2, no m/r/g    ABDOMEN: o/s/nt/nd, bs present  EXTREMITIES/SKIN: no rashes/lesions; no edema    Medications       amLODIPine  10 mg Oral Daily     losartan  50 mg Oral Daily     sodium chloride (PF)  3 mL Intracatheter Q8H       Data   BMP  Recent Labs   Lab 03/31/21  1135 03/31/21  0533 03/31/21  0013 03/30/21  1905 03/30/21  0600 03/30/21  0600 03/29/21  0606 03/29/21  0606 03/28/21  0608 03/28/21  0608   * 132* 132* 129*   < > 129*   < > 124*   < > 125*   POTASSIUM  --  4.2  --   --   --  4.2  --  4.1  --  4.1   CHLORIDE  --  105  --   --   --  101  --  96  --  96   GALINA  --  9.1  --   --   --  9.1  --  9.3  --  9.1   CO2  --  22  --   --   --  22  --  21  --  24   BUN  --  20  --   --   --  19  --  18  --  18   CR  --  1.07*  --   --   --  1.11*  --  1.10*  --  1.02   GLC  --  101*  --   --   --  91  --  106*  --  107*    < > = values in this interval not displayed.     [email or web address](phos:4)  CBC)  Recent Labs   Lab 03/28/21  0608 03/27/21  0651 03/26/21  2035   WBC 5.2 4.6 5.9   HGB 11.5* 10.7* 11.9   HCT 31.1* 28.6* 32.2*   MCV 79 78 78   * 117* 137*       Attestation:   I have reviewed today's relevant vital signs, notes, medications, labs and imaging.

## 2021-03-31 NOTE — PLAN OF CARE
DATE & TIME: 03/29/21 1900-0730  Cognitive Concerns/ Orientation : A&Ox4, forgetful. [place]  BEHAVIOR & AGGRESSION TOOL COLOR: green   CIWA SCORE: NA   ABNL VS/O2: VSS on RA  MOBILITY: SBA with GB/WK. PT/OT following.   PAIN MANAGMENT: denied pain.   DIET: Regular, 1200ml fluid restriction.   BOWEL/BLADDER: Incontinent of urine, no BM this shift.   ABNL LAB/BG: q6 Na checks: Na 129, 132, 132. MD aware. Cr 1.07  DRAIN/DEVICES: PIV on left arm SL  TELEMETRY RHYTHM: SR with 1st degree AVB.   SKIN: intact.   TESTS/PROCEDURES: Na Q 6.   D/C DATE: 1-2 days, recommended to prior living arrangement once sodium stable.   Discharge Barriers: Na level   OTHER IMPORTANT INFO: Nephrology, PT/OT following.

## 2021-04-01 ENCOUNTER — TELEPHONE (OUTPATIENT)
Dept: FAMILY MEDICINE | Facility: CLINIC | Age: 86
End: 2021-04-01

## 2021-04-01 ENCOUNTER — PATIENT OUTREACH (OUTPATIENT)
Dept: NURSING | Facility: CLINIC | Age: 86
End: 2021-04-01
Payer: MEDICARE

## 2021-04-01 DIAGNOSIS — N18.30 CKD (CHRONIC KIDNEY DISEASE) STAGE 3, GFR 30-59 ML/MIN (H): ICD-10-CM

## 2021-04-01 DIAGNOSIS — I10 ESSENTIAL HYPERTENSION, BENIGN: ICD-10-CM

## 2021-04-01 DIAGNOSIS — E87.1 HYPONATREMIA: Primary | ICD-10-CM

## 2021-04-01 ASSESSMENT — ACTIVITIES OF DAILY LIVING (ADL): DEPENDENT_IADLS:: INDEPENDENT

## 2021-04-01 NOTE — PROGRESS NOTES
Clinic Care Coordination Contact    OUTREACH    Referral Information:  Referral Source: IP Handoff  Primary Diagnosis: Other (hyponatremia)  Chief Complaint   Patient presents with     Clinic Care Coordination - Post Hospital     hyponatremia      Universal Utilization: reviewed on this date  Difficulty keeping appointments: No  Compliance Concerns: No  No-Show Concerns: No  No PCP office visit in Past Year: No  Utilization    Last refreshed: 4/1/2021  4:29 PM: Hospital Admissions 1           Last refreshed: 4/1/2021  4:29 PM: ED Visits 3           Last refreshed: 4/1/2021  4:29 PM: No Show Count (past year) 2              Current as of: 4/1/2021  4:29 PM          CC RN outreach to patient following recent hospitalization at [facility] from 3/26/21 to 3/31/21 for hyponatremia.    CC RN called and spoke with patient; introduced self, discussed role of Care Coordination, and explained reason for call.    Patient reported to be doing fairly well since being back home.  She denied current nausea, headache, or symptoms at this time.  She denied outstanding concerns.    Patient noted that her nephew who lives nearby (4 miles away) will be bringing her groceries today.  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  CC RN encouraged patient to communicate with her family and friends about her recent hospitalization and urge them to monitor her for any new/worsening symptoms, especially confusion as this could mean worsening hyponatremia; patient agreed to do so.    Patient requested the information again for Meals on Wheels; CC RN provided her with the information and requested that she notify CC RN if she would like additional meal service resources, she agreed to do so.    CC RN reviewed hospital discharge AVS with patient and discussed recommendations and follow-up instructions.  Patient noted her blood pressure had  "been poorly controlled, stating \"my blood pressure goes kind of nuts\"; she does have a home BP cuff but hasn't been checking her BP; CC RN encouraged her to begin checking daily BP and writing down her results, explained that she should notify clinic if she has readings 140/90, she stated understanding.  CC RN reviewed hyponatremia and management of this; discussed recommended 1200 ml/day fluid restriction and provided patient with associated conversions to pints and cups per her request.    CC RN reviewed patient's upcoming PCP appointment; patient noted her neighbor will like go with her to the appointment.  CC RN reviewed patient declining home care services at this time; patient confirmed and stated she wasn't agreeable to being homebound.  CC RN discussed Community Paramedic program instead; patient agreeable to referral.    CC RN reviewed Care Coordination program; patient agreeable to enrollment; see Goal below.    Clinical Concerns:  Current Medical Concerns: hyponatremia, HTN  Current Behavioral Concerns: none noted at this time  Education Provided to patient: reviewed hospital discharge AVS, discussed education on management of hyponatremia and HTN, provided information about Care Coordination and Community Paramedic program     Pain  Pain (GOAL): No    Medication Management:  Post-discharge medication reconciliation status: Discharge medications reviewed and reconciled.  Changed medications per note/orders (see AVS).   Of note, patient reported she has not been taking the Lexapro at all; she confirmed she still is not taking it (as advised at hospital discharge).    Functional Status:  Dependent ADLs: Independent  Dependent IADLs: Independent  Bed or wheelchair confined: No  Mobility Status: Independent    Living Situation:  Current living arrangement: I live in a private home  Type of residence: Other(condo)    Lifestyle & Psychosocial Needs:  Inadequate nutrition (GOAL): No  Tube Feeding: No  Inadequate " activity/exercise (GOAL): No  Significant changes in sleep pattern (GOAL): No  Transportation means: Regular car     Hoahaoism or spiritual beliefs that impact treatment: No  Mental health DX: No  Mental health management concern (GOAL): No  Chemical Dependency Status: No Current Concerns  Informal Support system: Family, Friends, Neighbors   Socioeconomic History     Marital status:      Spouse name: Not on file     Number of children: 1     Years of education: 15     Highest education level: Not on file   Occupational History     Occupation: [employer]     Tobacco Use     Smoking status: Never Smoker     Smokeless tobacco: Never Used   Substance and Sexual Activity     Alcohol use: Yes     Alcohol/week: 0.0 standard drinks     Comment: one glass wine a month     Drug use: No     Sexual activity: Not Currently     Partners: Male     Birth control/protection: Post-menopausal     Care Coordinator has reviewed patient's Social Determinants of Health (SDoH) on this date. Upon review, changes were not  made.      Resources and Interventions:  Community Resources: None  Supplies used at home: None  Employment Status: retire    Advance Care Plan/Directive  Advanced Care Plans/Directives on file: No  Advanced Care Plan/Directive Status: (not discussed this encounter)    Referrals Placed: Community Resources (Community Paramedics), Meals on Wheels     Goals:   Goals        General    1. Medical (pt-stated)     Notes - Note edited  4/1/2021  4:14 PM by Mick Bhagat RN    Goal Statement: I will avoid hospitalizations/ED visits within the next 3 months by monitoring and managing my sodium and blood pressure.  Date Goal set: 4/1/21  Barriers: history of hypertension, kidney disease  Strengths: motivated to avoid hospitalizations, willing to accept help  Date to Achieve By: 7/31/21  Patient expressed understanding of goal: Yes    Action steps to achieve this goal:  1. I will take my medications as  prescribed.  2. I will monitor my blood pressure daily and will notify clinic (ph: [phone]) if my blood pressure is higher than 140/90.  3. I will limit my fluid intake to 1200 mL (OR 2.5 pints OR 5 cups) per day.  4. I will work with the Community Paramedic program for additional support.  5. I will have routine labs and follow-up appointments with my care team as directed.  6. I will promptly contact clinic (ph: [phone]) if I have any symptoms such as nausea, headache, confusion.  7. I will continue working with Care Coordination to identify and address any barriers to achieving my goal.        Patient/Caregiver understanding: Yes    Outreach Frequency: weekly  Future Appointments              In 1 week Sesar Cuevas MD [facility]        Plan:    CC RN will send patient Care Coordination introduction letter and Complex Care Plan for reference    CC RN will pend Community Paramedic referral order to PCP for review and signature if agrees.    Patient will continue medications as prescribed, limit daily fluids, monitor her blood pressure each day, and will promptly notify care team of any new/worsening symptoms or concerns as discussed.    Patient will attend upcoming PCP appointment as scheduled.    Care Coordination will outreach to patient in approximately 1 week to get updates on patient status, assess goal progress, and offer additional support and resources as indicated.    Mick Bhagat RN  Clinic Care Coordinator  [facility]  [facility]  Ph: [phone]

## 2021-04-01 NOTE — PLAN OF CARE
Occupational Therapy Discharge Summary    Reason for therapy discharge:    Discharged to home.    Progress towards therapy goal(s). See goals on Care Plan in [facility] electronic health record for goal details.  Goals partially met.  Barriers to achieving goals:   discharge from facility.    Therapy recommendation(s):    Continued therapy is recommended.  Rationale/Recommendations:  Pt is below her stated baseline for ADLS at home.  Demonstrates multiple errors with medication management task, is not safe to manage her own medications herself.  Will need regular supervision for this.  Given this performance pt may also have issus with cooking, financial planning, and driving if she still does this.  Has family who she says look in on her regularly.  They would need to be able to take over this and possibly other IADLS for safety.  Pt would benefit from home safety eval to set up apporpriate supports and situations..    **Pt not seen by discharging therapist on this date, note written based on previous treating therapist's notes and recommendations.

## 2021-04-01 NOTE — LETTER
[facility] CARE COORDINATION  [facility]  [address] [place] [address]    April 1, 2021    Tanisha Muhammad  [address]   [facility] [address]      Dear Tanisha,    I am a clinic care coordinator who works with Sesar Cuevas MD at [facility]. I wanted to thank you for spending the time to talk with me.  Below is a description of clinic care coordination and how I can further assist you.      The clinic care coordination team is made up of a registered nurse,  and community health worker who understand the health care system. The goal of clinic care coordination is to help you manage your health and improve access to the health care system in the most efficient manner. The team can assist you in meeting your health care goals by providing education, coordinating services, strengthening the communication among your providers and supporting you with any resource needs.    Please feel free to contact me with any questions or concerns. We are focused on providing you with the highest-quality healthcare experience possible and that all starts with you.     Sincerely,     Mick Bhagat RN  Clinic Care Coordinator  [facility]  [facility], [facility]  Ph: [phone]      Enclosed: I have enclosed a copy of the Complex Care Plan. This has helpful information and goals that we have talked about. Please keep this in an easy to access place to use as needed.
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
## 2021-04-02 ENCOUNTER — PATIENT OUTREACH (OUTPATIENT)
Dept: OTHER | Facility: CLINIC | Age: 86
End: 2021-04-02

## 2021-04-02 ENCOUNTER — HOSPITAL ENCOUNTER (INPATIENT)
Facility: CLINIC | Age: 86
LOS: 3 days | Discharge: NURSING, REHAB OR LONG-TERM CARE | DRG: 065 | End: 2021-04-06
Attending: EMERGENCY MEDICINE | Admitting: INTERNAL MEDICINE
Payer: MEDICARE

## 2021-04-02 ENCOUNTER — APPOINTMENT (OUTPATIENT)
Dept: CT IMAGING | Facility: CLINIC | Age: 86
DRG: 065 | End: 2021-04-02
Attending: EMERGENCY MEDICINE
Payer: MEDICARE

## 2021-04-02 DIAGNOSIS — I63.9 CEREBROVASCULAR ACCIDENT (CVA), UNSPECIFIED MECHANISM (H): ICD-10-CM

## 2021-04-02 DIAGNOSIS — R79.89 ELEVATED TROPONIN: ICD-10-CM

## 2021-04-02 LAB
ALBUMIN SERPL-MCNC: 3.9 G/DL (ref 3.4–5)
ALP SERPL-CCNC: 65 U/L (ref 40–150)
ALT SERPL W P-5'-P-CCNC: 39 U/L (ref 0–50)
ANION GAP SERPL CALCULATED.3IONS-SCNC: 9 MMOL/L (ref 3–14)
AST SERPL W P-5'-P-CCNC: 37 U/L (ref 0–45)
BASOPHILS # BLD AUTO: 0 10E9/L (ref 0–0.2)
BASOPHILS NFR BLD AUTO: 0.4 %
BILIRUB SERPL-MCNC: 0.5 MG/DL (ref 0.2–1.3)
BUN SERPL-MCNC: 30 MG/DL (ref 7–30)
CALCIUM SERPL-MCNC: 9.8 MG/DL (ref 8.5–10.1)
CHLORIDE SERPL-SCNC: 104 MMOL/L (ref 94–109)
CO2 SERPL-SCNC: 21 MMOL/L (ref 20–32)
CREAT SERPL-MCNC: 1.21 MG/DL (ref 0.52–1.04)
DIFFERENTIAL METHOD BLD: ABNORMAL
EOSINOPHIL # BLD AUTO: 0 10E9/L (ref 0–0.7)
EOSINOPHIL NFR BLD AUTO: 0.6 %
ERYTHROCYTE [DISTWIDTH] IN BLOOD BY AUTOMATED COUNT: 12.8 % (ref 10–15)
GFR SERPL CREATININE-BSD FRML MDRD: 38 ML/MIN/{1.73_M2}
GLUCOSE SERPL-MCNC: 124 MG/DL (ref 70–99)
HCT VFR BLD AUTO: 29.9 % (ref 35–47)
HGB BLD-MCNC: 10.3 G/DL (ref 11.7–15.7)
IMM GRANULOCYTES # BLD: 0.1 10E9/L (ref 0–0.4)
IMM GRANULOCYTES NFR BLD: 0.9 %
LYMPHOCYTES # BLD AUTO: 1.1 10E9/L (ref 0.8–5.3)
LYMPHOCYTES NFR BLD AUTO: 21 %
MCH RBC QN AUTO: 28.9 PG (ref 26.5–33)
MCHC RBC AUTO-ENTMCNC: 34.4 G/DL (ref 31.5–36.5)
MCV RBC AUTO: 84 FL (ref 78–100)
MONOCYTES # BLD AUTO: 0.6 10E9/L (ref 0–1.3)
MONOCYTES NFR BLD AUTO: 11.3 %
NEUTROPHILS # BLD AUTO: 3.5 10E9/L (ref 1.6–8.3)
NEUTROPHILS NFR BLD AUTO: 65.8 %
NRBC # BLD AUTO: 0 10*3/UL
NRBC BLD AUTO-RTO: 0 /100
PLATELET # BLD AUTO: 159 10E9/L (ref 150–450)
POTASSIUM SERPL-SCNC: 4 MMOL/L (ref 3.4–5.3)
PROT SERPL-MCNC: 7.7 G/DL (ref 6.8–8.8)
RBC # BLD AUTO: 3.57 10E12/L (ref 3.8–5.2)
SODIUM SERPL-SCNC: 134 MMOL/L (ref 133–144)
WBC # BLD AUTO: 5.4 10E9/L (ref 4–11)

## 2021-04-02 PROCEDURE — 87635 SARS-COV-2 COVID-19 AMP PRB: CPT | Performed by: EMERGENCY MEDICINE

## 2021-04-02 PROCEDURE — 96374 THER/PROPH/DIAG INJ IV PUSH: CPT | Mod: 59

## 2021-04-02 PROCEDURE — 85610 PROTHROMBIN TIME: CPT | Performed by: EMERGENCY MEDICINE

## 2021-04-02 PROCEDURE — 83036 HEMOGLOBIN GLYCOSYLATED A1C: CPT | Performed by: EMERGENCY MEDICINE

## 2021-04-02 PROCEDURE — C9803 HOPD COVID-19 SPEC COLLECT: HCPCS

## 2021-04-02 PROCEDURE — 93005 ELECTROCARDIOGRAM TRACING: CPT

## 2021-04-02 PROCEDURE — 250N000011 HC RX IP 250 OP 636: Performed by: EMERGENCY MEDICINE

## 2021-04-02 PROCEDURE — 84484 ASSAY OF TROPONIN QUANT: CPT | Performed by: EMERGENCY MEDICINE

## 2021-04-02 PROCEDURE — 99285 EMERGENCY DEPT VISIT HI MDM: CPT | Mod: 25

## 2021-04-02 PROCEDURE — 70450 CT HEAD/BRAIN W/O DYE: CPT | Mod: MG

## 2021-04-02 PROCEDURE — 85025 COMPLETE CBC W/AUTO DIFF WBC: CPT | Performed by: EMERGENCY MEDICINE

## 2021-04-02 PROCEDURE — 80053 COMPREHEN METABOLIC PANEL: CPT | Performed by: EMERGENCY MEDICINE

## 2021-04-02 PROCEDURE — 250N000009 HC RX 250: Performed by: EMERGENCY MEDICINE

## 2021-04-02 PROCEDURE — 85730 THROMBOPLASTIN TIME PARTIAL: CPT | Performed by: EMERGENCY MEDICINE

## 2021-04-02 PROCEDURE — 70498 CT ANGIOGRAPHY NECK: CPT | Mod: MG

## 2021-04-02 RX ORDER — IOPAMIDOL 755 MG/ML
120 INJECTION, SOLUTION INTRAVASCULAR ONCE
Status: COMPLETED | OUTPATIENT
Start: 2021-04-02 | End: 2021-04-02

## 2021-04-02 RX ADMIN — SODIUM CHLORIDE 100 ML: 9 INJECTION, SOLUTION INTRAVENOUS at 23:37

## 2021-04-02 RX ADMIN — IOPAMIDOL 120 ML: 755 INJECTION, SOLUTION INTRAVENOUS at 23:38

## 2021-04-02 ASSESSMENT — ENCOUNTER SYMPTOMS
HEADACHES: 0
FATIGUE: 1
VOMITING: 0
WEAKNESS: 1
ABDOMINAL PAIN: 0
DIARRHEA: 0

## 2021-04-02 NOTE — PROGRESS NOTES
Called Tanisha to set up an in home Community Paramedic visit. Patient declined visit at first. I told Tanisha I could go through her medications and Check her vital signs, she said she has a bp cuff, I said it was reported that she had not been using it. Tanisha said she would take her bp over the phone for me. Bp was 150/73 pulse 98. I told her her bp is higher today than when she was in the hospital, she agreed it is higher and let me know she was uncomfortable with a man coming into her home. I asked if she had a friend or relative that could join our first visit and she said her neighbor Soraya could.  Tanisha put me on hold, went to Soraya's apartment and brought her to the phone. Soraya agreed to the appointment but said she would verify with Lead Care Coordinator, Mick Bhagat. I gave the women Mick's phone number and we agreed to Tuesday 10:00am.

## 2021-04-03 ENCOUNTER — APPOINTMENT (OUTPATIENT)
Dept: CARDIOLOGY | Facility: CLINIC | Age: 86
DRG: 065 | End: 2021-04-03
Attending: INTERNAL MEDICINE
Payer: MEDICARE

## 2021-04-03 ENCOUNTER — APPOINTMENT (OUTPATIENT)
Dept: SPEECH THERAPY | Facility: CLINIC | Age: 86
DRG: 065 | End: 2021-04-03
Attending: INTERNAL MEDICINE
Payer: MEDICARE

## 2021-04-03 ENCOUNTER — APPOINTMENT (OUTPATIENT)
Dept: SPEECH THERAPY | Facility: CLINIC | Age: 86
DRG: 065 | End: 2021-04-03
Payer: MEDICARE

## 2021-04-03 ENCOUNTER — APPOINTMENT (OUTPATIENT)
Dept: PHYSICAL THERAPY | Facility: CLINIC | Age: 86
DRG: 065 | End: 2021-04-03
Attending: INTERNAL MEDICINE
Payer: MEDICARE

## 2021-04-03 ENCOUNTER — APPOINTMENT (OUTPATIENT)
Dept: OCCUPATIONAL THERAPY | Facility: CLINIC | Age: 86
DRG: 065 | End: 2021-04-03
Attending: INTERNAL MEDICINE
Payer: MEDICARE

## 2021-04-03 PROBLEM — R79.89 ELEVATED TROPONIN: Status: ACTIVE | Noted: 2021-04-03

## 2021-04-03 PROBLEM — I63.9 CEREBROVASCULAR ACCIDENT (CVA), UNSPECIFIED MECHANISM (H): Status: ACTIVE | Noted: 2021-04-03

## 2021-04-03 LAB
ANION GAP SERPL CALCULATED.3IONS-SCNC: 6 MMOL/L (ref 3–14)
APTT PPP: 39 SEC (ref 22–37)
BUN SERPL-MCNC: 23 MG/DL (ref 7–30)
CALCIUM SERPL-MCNC: 10.1 MG/DL (ref 8.5–10.1)
CHLORIDE SERPL-SCNC: 108 MMOL/L (ref 94–109)
CHOLEST SERPL-MCNC: 146 MG/DL
CO2 SERPL-SCNC: 24 MMOL/L (ref 20–32)
CREAT SERPL-MCNC: 1.13 MG/DL (ref 0.52–1.04)
GFR SERPL CREATININE-BSD FRML MDRD: 41 ML/MIN/{1.73_M2}
GLUCOSE BLDC GLUCOMTR-MCNC: 145 MG/DL (ref 70–99)
GLUCOSE SERPL-MCNC: 181 MG/DL (ref 70–99)
HBA1C MFR BLD: 5.6 % (ref 0–5.6)
HDLC SERPL-MCNC: 68 MG/DL
INR PPP: 1.03 (ref 0.86–1.14)
INTERPRETATION ECG - MUSE: NORMAL
LABORATORY COMMENT REPORT: NORMAL
LDLC SERPL CALC-MCNC: 68 MG/DL
NONHDLC SERPL-MCNC: 78 MG/DL
POTASSIUM SERPL-SCNC: 3.8 MMOL/L (ref 3.4–5.3)
SARS-COV-2 RNA RESP QL NAA+PROBE: NEGATIVE
SODIUM SERPL-SCNC: 138 MMOL/L (ref 133–144)
SPECIMEN SOURCE: NORMAL
TRIGL SERPL-MCNC: 52 MG/DL
TROPONIN I SERPL-MCNC: 1.7 UG/L (ref 0–0.04)
TROPONIN I SERPL-MCNC: 2.31 UG/L (ref 0–0.04)

## 2021-04-03 PROCEDURE — 99223 1ST HOSP IP/OBS HIGH 75: CPT | Mod: AI | Performed by: INTERNAL MEDICINE

## 2021-04-03 PROCEDURE — 92610 EVALUATE SWALLOWING FUNCTION: CPT | Mod: GN

## 2021-04-03 PROCEDURE — 999N001017 HC STATISTIC GLUCOSE BY METER IP

## 2021-04-03 PROCEDURE — 120N000001 HC R&B MED SURG/OB

## 2021-04-03 PROCEDURE — 97166 OT EVAL MOD COMPLEX 45 MIN: CPT | Mod: GO

## 2021-04-03 PROCEDURE — 36415 COLL VENOUS BLD VENIPUNCTURE: CPT | Performed by: INTERNAL MEDICINE

## 2021-04-03 PROCEDURE — 250N000013 HC RX MED GY IP 250 OP 250 PS 637: Performed by: EMERGENCY MEDICINE

## 2021-04-03 PROCEDURE — 97116 GAIT TRAINING THERAPY: CPT | Mod: GP

## 2021-04-03 PROCEDURE — 250N000011 HC RX IP 250 OP 636: Performed by: EMERGENCY MEDICINE

## 2021-04-03 PROCEDURE — 99207 PR NO CHARGE LOS: CPT | Performed by: PHYSICIAN ASSISTANT

## 2021-04-03 PROCEDURE — 92523 SPEECH SOUND LANG COMPREHEN: CPT | Mod: GN

## 2021-04-03 PROCEDURE — 97530 THERAPEUTIC ACTIVITIES: CPT | Mod: GP

## 2021-04-03 PROCEDURE — 80048 BASIC METABOLIC PNL TOTAL CA: CPT | Performed by: INTERNAL MEDICINE

## 2021-04-03 PROCEDURE — 999N000208 ECHOCARDIOGRAM COMPLETE

## 2021-04-03 PROCEDURE — 93306 TTE W/DOPPLER COMPLETE: CPT | Mod: 26 | Performed by: INTERNAL MEDICINE

## 2021-04-03 PROCEDURE — 97535 SELF CARE MNGMENT TRAINING: CPT | Mod: GO

## 2021-04-03 PROCEDURE — 97161 PT EVAL LOW COMPLEX 20 MIN: CPT | Mod: GP

## 2021-04-03 PROCEDURE — 84484 ASSAY OF TROPONIN QUANT: CPT | Performed by: INTERNAL MEDICINE

## 2021-04-03 PROCEDURE — 80061 LIPID PANEL: CPT | Performed by: INTERNAL MEDICINE

## 2021-04-03 PROCEDURE — 99222 1ST HOSP IP/OBS MODERATE 55: CPT | Performed by: PSYCHIATRY & NEUROLOGY

## 2021-04-03 RX ORDER — ASPIRIN 81 MG/1
81 TABLET ORAL DAILY
Status: DISCONTINUED | OUTPATIENT
Start: 2021-04-04 | End: 2021-04-06 | Stop reason: HOSPADM

## 2021-04-03 RX ORDER — ONDANSETRON 4 MG/1
4 TABLET, ORALLY DISINTEGRATING ORAL EVERY 6 HOURS PRN
Status: DISCONTINUED | OUTPATIENT
Start: 2021-04-03 | End: 2021-04-06 | Stop reason: HOSPADM

## 2021-04-03 RX ORDER — ASPIRIN 81 MG/1
81 TABLET, CHEWABLE ORAL DAILY
Status: DISCONTINUED | OUTPATIENT
Start: 2021-04-04 | End: 2021-04-06 | Stop reason: HOSPADM

## 2021-04-03 RX ORDER — ONDANSETRON 2 MG/ML
4 INJECTION INTRAMUSCULAR; INTRAVENOUS EVERY 6 HOURS PRN
Status: DISCONTINUED | OUTPATIENT
Start: 2021-04-03 | End: 2021-04-06 | Stop reason: HOSPADM

## 2021-04-03 RX ORDER — PROCHLORPERAZINE 25 MG
12.5 SUPPOSITORY, RECTAL RECTAL EVERY 12 HOURS PRN
Status: DISCONTINUED | OUTPATIENT
Start: 2021-04-03 | End: 2021-04-06 | Stop reason: HOSPADM

## 2021-04-03 RX ORDER — ASPIRIN 81 MG/1
324 TABLET, CHEWABLE ORAL ONCE
Status: COMPLETED | OUTPATIENT
Start: 2021-04-03 | End: 2021-04-03

## 2021-04-03 RX ORDER — LABETALOL HYDROCHLORIDE 5 MG/ML
10-20 INJECTION, SOLUTION INTRAVENOUS EVERY 10 MIN PRN
Status: DISCONTINUED | OUTPATIENT
Start: 2021-04-03 | End: 2021-04-06 | Stop reason: HOSPADM

## 2021-04-03 RX ORDER — PROCHLORPERAZINE MALEATE 5 MG
5 TABLET ORAL EVERY 6 HOURS PRN
Status: DISCONTINUED | OUTPATIENT
Start: 2021-04-03 | End: 2021-04-06 | Stop reason: HOSPADM

## 2021-04-03 RX ORDER — CLOPIDOGREL 300 MG/1
300 TABLET, FILM COATED ORAL ONCE
Status: COMPLETED | OUTPATIENT
Start: 2021-04-03 | End: 2021-04-03

## 2021-04-03 RX ORDER — HYDRALAZINE HYDROCHLORIDE 20 MG/ML
10-20 INJECTION INTRAMUSCULAR; INTRAVENOUS
Status: DISCONTINUED | OUTPATIENT
Start: 2021-04-03 | End: 2021-04-06 | Stop reason: HOSPADM

## 2021-04-03 RX ORDER — ONDANSETRON 2 MG/ML
4 INJECTION INTRAMUSCULAR; INTRAVENOUS ONCE
Status: COMPLETED | OUTPATIENT
Start: 2021-04-03 | End: 2021-04-03

## 2021-04-03 RX ORDER — LIDOCAINE 40 MG/G
CREAM TOPICAL
Status: DISCONTINUED | OUTPATIENT
Start: 2021-04-03 | End: 2021-04-06 | Stop reason: HOSPADM

## 2021-04-03 RX ORDER — CLOPIDOGREL BISULFATE 75 MG/1
75 TABLET ORAL DAILY
Status: DISCONTINUED | OUTPATIENT
Start: 2021-04-04 | End: 2021-04-06 | Stop reason: HOSPADM

## 2021-04-03 RX ORDER — ACETAMINOPHEN 325 MG/1
650 TABLET ORAL EVERY 4 HOURS PRN
Status: DISCONTINUED | OUTPATIENT
Start: 2021-04-03 | End: 2021-04-06 | Stop reason: HOSPADM

## 2021-04-03 RX ADMIN — ASPIRIN 81 MG CHEWABLE TABLET 324 MG: 81 TABLET CHEWABLE at 00:18

## 2021-04-03 RX ADMIN — CLOPIDOGREL BISULFATE 300 MG: 300 TABLET, FILM COATED ORAL at 00:18

## 2021-04-03 RX ADMIN — ONDANSETRON 4 MG: 2 INJECTION INTRAMUSCULAR; INTRAVENOUS at 03:43

## 2021-04-03 ASSESSMENT — ACTIVITIES OF DAILY LIVING (ADL): ADLS_ACUITY_SCORE: 17

## 2021-04-03 NOTE — ED NOTES
[facility]  ED Nurse Handoff Report    ED Chief complaint: Altered Mental Status and Hypertension      ED Diagnosis:   Final diagnoses:   None       Code Status: Full Code    Allergies:   Allergies   Allergen Reactions     Indomethacin      Cognitive impairment     Keflex [Cephalexin]      HIVES     Sulfamethoxazole-Trimethoprim      BACTRIM        Patient Story: patient here with confusion,weakness. Patient friend who lives next door noticed patient was unable to speak and was confused. Patient lives alone ,she is confused and very [place], does not wear hearing aid. She ask the same question over and over. She is able to move  All four extremities . Per  Her friend she has a walker and cane but patient does not use them  Focused Assessment:  See above    Treatments and/or interventions provided: labs,EKG and CT  Patient's response to treatments and/or interventions: ongoing    To be done/followed up on inpatient unit:  .    Does this patient have any cognitive concerns?: Forgetful    Activity level - Baseline/Home:  Stand with Assist  Activity Level - Current:   Stand with Assist    Patient's Preferred language: English   Needed?: No    Isolation: None  Infection: Not Applicable  Patient tested for COVID 19 prior to admission: YES  Bariatric?: No    Vital Signs:   Vitals:    04/02/21 2224 04/03/21 0000 04/03/21 0030   BP: (!) 163/61 (!) 167/72 (!) 173/96   Pulse: 94 96 103   Resp: 18 19 15   Temp: 97.9  F (36.6  C)     TempSrc: Temporal     SpO2: 99% 99% 98%       Cardiac Rhythm:     Was the PSS-3 completed:  Patient confused  What interventions are required if any?               Family Comments: patient has a friend here  OBS brochure/video discussed/provided to patient/family: No              Name of person given brochure if not patient: .              Relationship to patient: .    For the majority of the shift this patient's behavior was Green.   Behavioral interventions performed were  none.    ED NURSE PHONE NUMBER: [phone]

## 2021-04-03 NOTE — PROGRESS NOTES
04/03/21 1544   Quick Adds   Type of Visit Initial PT Evaluation   Living Environment   People in home alone   Current Living Arrangements condominium   Home Accessibility no concerns   Transportation Anticipated family or friend will provide   Living Environment Comments Obtained through chart review as pt unable to state home information at this time   Self-Care   Usual Activity Tolerance good   Current Activity Tolerance fair   Equipment Currently Used at Home none   Activity/Exercise/Self-Care Comment Pt was recently hospitalized in March 2021 and had PT/OT evaluations at that time, pt with difficulty stating information now due to aphasia and confusion. Per chart review, pt was independent, did not normally use FWW but was using one during last hospital stay, has supportive neighbor as well as a nephew who checks in on her. Pt states she was driving up until recently. Pt was set up to receive home care services after discharge from hospital in March but unclear if these services had been initiated yet.   Disability/Function   Fall history within last six months no   General Information   Onset of Illness/Injury or Date of Surgery 04/02/21   Referring Physician Florentin Hinojosa MD   Patient/Family Therapy Goals Statement (PT) To go home   Pertinent History of Current Problem (include personal factors and/or comorbidities that impact the POC) Pt is 89 year old female adm on 4/2/2021 for evaluation of AMS and fatigue. Pt had been out with a friend and appeared not to recognize her own apartment and was having difficulty walking on her own. Head CT showed moderate sized evolving acute ischemic infarct in L PCA territory. Pt outside window for thrombolytics. Pt had been discharged from the hospital after an admission for hyponatremia 2 days prior to current admission. PMH includes HTN and CKD.   Existing Precautions/Restrictions fall   Cognition   Orientation Status (Cognition) oriented to;person;place    Affect/Mental Status (Cognition) WFL   Follows Commands (Cognition) delayed response/completion;increased processing time needed;repetition of directions required   Cognitive Status Comments Pt is hard of hearing but also appears to have difficulties with processing/completing tasks. Pt is aphasic and has difficulty getting out more than a couple words at a time   Pain Assessment   Patient Currently in Pain No   Posture    Posture Forward head position   Range of Motion (ROM)   ROM Comment B LE ROM WFL   Strength   Strength Comments B LE strength grossly WFL, difficult to fully assess but demonstrates functional strength and no focal weakness noted with functional tasks   Bed Mobility   Comment (Bed Mobility) Pt OOB at this time   Transfers   Transfer Safety Comments CGA with increased time   Gait/Stairs (Locomotion)   Comment (Gait/Stairs) Min assist with FWW   Balance   Balance Comments Overall unsteady, no overt LOB   Coordination   Coordination Comments Appears to have some coordination and motor planning deficits   Clinical Impression   Criteria for Skilled Therapeutic Intervention yes, treatment indicated   PT Diagnosis (PT) Impaired gait   Influenced by the following impairments Decreased balance, decreased coordination, decreased activity tolerance   Functional limitations due to impairments Decreased ability to participate in daily tasks   Clinical Presentation Stable/Uncomplicated   Clinical Presentation Rationale Current presentation, [facility]   Clinical Decision Making (Complexity) low complexity   Therapy Frequency (PT) Daily   Predicted Duration of Therapy Intervention (days/wks) 4 days   Planned Therapy Interventions (PT) balance training;bed mobility training;gait training;home exercise program;neuromuscular re-education;patient/family education;strengthening;transfer training   Risk & Benefits of therapy have been explained evaluation/treatment results reviewed;care plan/treatment goals  reviewed;risks/benefits reviewed;current/potential barriers reviewed;participants voiced agreement with care plan;participants included;patient   PT Discharge Planning    PT Discharge Recommendation (DC Rec) [facility]-Motivated patient will benefit from intensive, interdisciplinary therapy.  Anticipate will be able to tolerate 3 hours of therapy per day   PT Rationale for DC Rec Pt is below baseline level of function, demonstrates decreased balance, coordination, motor planning, processing, activity tolerance, and overall safety with functional mobility. Pt would benefit from intensive inpatient rehab program with multidisciplinary approach to optimize functional recovery.   PT Brief overview of current status  CGA sit to stand transfers, min assist ambulation with FWW   Total Evaluation Time   Total Evaluation Time (Minutes) 10

## 2021-04-03 NOTE — ED PROVIDER NOTES
History   Chief Complaint:  Altered Mental Status and Fatigue     The history is provided by the patient and a friend.      Tanisha Muhammad is a 89 year old female with history of recent hospital discharge 2 days ago who presents for evaluation of altered mental status and fatigue. The patient is present with a friend who provides most of the history as patient is not able to. The friend reports that she was with the patient earlier tonight around 645pm when she seemed at her current baseline.  However at 730 pm was not able to walk on her own and speak formal sentences. Seemed not to recognize being in her own apartment. She is here with her friend who lives in the same [facility]. The patient is unable to identify the current year in the room, however the patient's friend reports she is doing better than she was earlier tonight. The patient denies any vomiting, diarrhea, chest pain, abdominal pain, and headache.  History limited.    Review of Systems   Unable to perform ROS: Mental status change   Constitutional: Positive for fatigue.   Cardiovascular: Negative for chest pain.        Elevated Blood Pressure   Gastrointestinal: Negative for abdominal pain, diarrhea and vomiting.   Neurological: Positive for weakness. Negative for headaches.   Psychiatric/Behavioral:        Confusion   All other systems reviewed and are negative.    Allergies:  Indomethacin  Keflex [Cephalexin]  Sulfamethoxazole-Trimethoprim    Medications:  Norvasc  Estrace Vaginal Cream  Cozaar  Demadex    Past Medical History:    Cystocele   Elevated glucose   Gout   Tye's disease   Inactive Ménière's disease   Post-menopausal bleeding   Rectocele   Trigeminal neuralgia   essential hypertension   Venous peripheral insufficiency  Hyponatremia  Vitamin B-complex deficiency  Vitamin D deficiency  CKD Stage 3  Lumbago     Past Surgical History:    Cataract Bilateral   Laser Yag capsulotomy      Family History:    Kidney cancer  x2  Hypertension  Heart Disease x2  Emphysema  MI  Fibromyalgia    Social History:  The patient presents to the ED with friend.    Physical Exam     Patient Vitals for the past 24 hrs:   BP Temp Temp src Pulse Resp SpO2   04/03/21 0245 (!) 154/78 -- -- 78 16 97 %   04/03/21 0230 (!) 159/76 -- -- 89 15 99 %   04/03/21 0215 130/72 -- -- 81 15 96 %   04/03/21 0200 114/58 -- -- 80 15 96 %   04/03/21 0145 131/55 -- -- 84 14 97 %   04/03/21 0130 (!) 164/57 -- -- 93 15 97 %   04/03/21 0100 (!) 161/71 -- -- 92 18 98 %   04/03/21 0045 (!) 164/72 -- -- 93 17 99 %   04/03/21 0030 (!) 173/96 -- -- 103 15 98 %   04/03/21 0000 (!) 167/72 -- -- 96 19 99 %   04/02/21 2224 (!) 163/61 97.9  F (36.6  C) Temporal 94 18 99 %       Physical Exam  Eyes:               Sclera white; Pupils are equal and round  ENT:                External ears and nares normal  CV:                  Rate as above with regular rhythm   Resp:               Breath sounds clear and equal bilaterally                          Non-labored, no retractions or accessory muscle use  GI:                   Abdomen is soft, non-tender, non-distended                          No rebound tenderness or peritoneal features  MS:                  Moves all extremities  Skin:                Warm and dry  Neuro:             Hard of hearing.  Even with hearing is very difficult to follow instructions.  Oriented to self.  Determines month by going sequentially from January.  Year 20.... 24, oh wait... what did you ask me?  Pointing to watchband she was not certain how to answer.        National Institutes of Health Stroke Scale  Exam Interval: Baseline    Score    Level of consciousness: (0)   Alert, keenly responsive    LOC questions: (1)   Answers one question correctly    LOC commands: (1)   Performs one task correctly    Best gaze: (0)   Normal    Visual: (0)   No visual loss    Facial palsy: (0)   Normal symmetrical movements    Motor arm (left): (0)   No drift    Motor arm  (right): (0)   No drift    Motor leg (left): (0)   No drift    Motor leg (right): (0)   No drift    Limb ataxia: (0)   Absent    Sensory: (0)   Normal- no sensory loss    Best language: (1)   Mild to moderate aphasia    Dysarthria: (0)   Normal    Extinction and inattention: (1)   Visual, tacile, auditory, spatial, person inattention          Total Score:  4             Emergency Department Course     ECG:  Indication: Acute Confusion  Completed at 2354.  Read at 2359.   Sinus rhythm with 1st degree AV block Possible anterior infarct, age undetermined. .Abnormal ECG.   Compared to EKG from 03/26/21.  Rate 96 bpm. MI interval 228. QRS duration 84. QT/QTc 340/429. P-R-T axes 59 6 37.    Imaging:  CT Head w/o Contrast:  1.  Moderate-sized evolving acute ischemic infarct in the left PCA territory involving the mesial temporal structures and inferomedial left temporal and occipital junction. No hemorrhage.   2.  Chronic infarcts at the right parietal/occipital junction, left parietal cortex. Chronic lacunar infarcts in the left thalamus, bilateral basal ganglia and bilateral cerebellar hemispheres.  Report per radiology     CT Head Perfusion w Contrast:  1.  Acute occlusion of the left P2 segment.   2.  Otherwise unremarkable.  Report per radiology     CTA Head Neck w Contrast:  1.  No significant stenosis in the neck vessels based on NASCET criteria.   2.  No evidence for dissection.   3.  Luminal irregularity of the right greater than left internal carotid arteries consistent with fibromuscular dysplasia.  Report per radiology     Laboratory:  CMP: Glucose 124 (H), Creat 1.21 (H)    CBC: WBC 5.4, HGB 10.3 (L),      PTT: 39 (H)    INR: 1.03    Troponin (collected 2218): 2.309 (H)    Asymptomatic COVID-19 PCR: Negative    Emergency Department Course:    Reviewed:  I reviewed nursing notes, vitals, past medical history and care everywhere    Assessments:   IV was inserted and blood was drawn for laboratory testing,  results above.    2319 I performed a physical exam of the patient. Findings as above.      EKG obtained in the ED, see results above.      The patient was sent for a several CT images while in the emergency department, results above.      The patient's nose was swabbed and this sample was sent for COVID testing, findings above.     0035 Patient rechecked and updated. Plan of care discussed and questions answered.     Consults:   2357 I spoke with radiology service regarding patient's CT findings.     0007 I spoke with Stroke Team regarding patient's presentation, findings, and plan of care.    0015 I spoke with neurology regarding patient's presentation, findings, and plan of care.    0238 I spoke with Dr. Hinojosa of the Hospitalist service regarding patient's presentation, findings, and plan of care.    Interventions:  0018 Aspirin 324 mg Oral  0018 Plavix 300 mg Oral    Disposition:  The patient was admitted to the hospital under the care of Dr. Hinojosa.     Impression & Plan   CMS Diagnoses: The patient has stroke symptoms:         ED Stroke specific documentation           NIHSS PDF     Patient last known well time: 1845  ED Provider first to bedside at: 2319  CT Results received at: 2357    Thrombolytics:   Not given due to unclear or unfavorable risk-benefit profile for extended window thrombolysis beyond the conventional 4.5 hour time window.    If treating with thrombolytics: Ensure SBP<180 and DBP<105 prior to treatment with thrombolytics.  Administering thrombolytics after treatment with IV labetalol, hydralazine, or nicardipine is reasonable once BP control is established.    Endovascular Retrieval:  Not initiated due to absence of proximal vessel occlusion    National Institutes of Health Stroke Scale (Baseline)  Time Performed: 2321    Stroke Mimics were considered (including migraine headache, seizure disorder, hypoglycemia (or hyperglycemia), head or spinal trauma, CNS infection, Toxin ingestion and  shock state (e.g. sepsis) .    Covid-19  Tanisha Muhammad was evaluated during a global COVID-19 pandemic, which necessitated consideration that the patient might be at risk for infection with the SARS-CoV-2 virus that causes COVID-19.   Applicable protocols for evaluation were followed during the patient's care.   COVID-19 was considered as part of the patient's evaluation. The plan for testing is: a test was obtained during this visit.    Medical Decision Making:  Tanisha Muhammad is a 89 year old female who presents after a episode of transient neurologic symptoms. Based on concern for intracranial event code stroke was called. Case was discussed with both radiology and neurology and is consistent with new left PCA territory stroke. She is outside the time window of TPA and furthermore with evidence of stroke her rapid improvement and lack of current disabling symptoms risk would far out way the benefits in her case. There are no proximal lesions appropriate for thrombectomy. She was given Asprin and Plavix and admitted to the hospital for further work up and medication. Troponin was noted to be elevated but she denies chest pain and EKG shows no signs of ischemia.  She is already covered with aspirin and Plavix and heparin is not felt to be indicated as this is not consistent with unstable angina.  D/w hospitalist.    Diagnosis:    ICD-10-CM    1. Cerebrovascular accident (CVA), unspecified mechanism (H)  I63.9    2. Elevated troponin  R77.8      Scribe Disclosure:  Rudy LEONG, am serving as a scribe at 11:17 PM on 4/2/2021 to document services personally performed by Julia Louis MD based on my observations and the provider's statements to me.     [place] Julia Ewing MD  04/03/21 0828

## 2021-04-03 NOTE — PROGRESS NOTES
"   04/03/21 1529   Quick Adds   Type of Visit Initial Occupational Therapy Evaluation   Living Environment   People in home alone   Current Living Arrangements condominium   Home Accessibility no concerns   Transportation Anticipated family or friend will provide   Living Environment Comments Obtained from previous chart: pt lives on first floor, elevator in building.  Laundry room in basement   Self-Care   Usual Activity Tolerance good   Current Activity Tolerance fair   Equipment Currently Used at Home none   Activity/Exercise/Self-Care Comment Difficult to assess as pt with aphasia, confusion and very [place]. Pt says \"no\" when asked if she lived with anyone. Says, \"yes\" when asked if she completed daily activites on her own. When asked about driving, cooking, cleaning or shopping pt attempts to answer but speech is tangential with word substitutions that a meaning was unable to be interpreted. Per chart: pt states she is independent at home, does all her own cooking and cleaning   Disability/Function   Hearing Difficulty or Deaf yes   Hearing Management Turns head to listen out of R ear. Try pocket talker?   Wear Glasses or Blind no   Concentrating, Remembering or Making Decisions Difficulty yes   Difficulty Communicating yes   Walking or Climbing Stairs Difficulty yes   Walking or Climbing Stairs ambulation difficulty, requires equipment;ambulation difficulty, assistance 1 person   Dressing/Bathing Difficulty yes   Dressing/Bathing dressing difficulty, assistance 1 person   Toileting issues yes   Toileting Assistance toileting difficulty, assistance 1 person   General Information   Onset of Illness/Injury or Date of Surgery 04/02/21   Referring Physician Florentin Hinojosa MD   Patient/Family Therapy Goal Statement (OT) \"Well are you going to let me stay if you think it will be\" Therapist unable to interpret   Additional Occupational Profile Info/Pertinent History of Current Problem Tanisha Muhammad is a 89 " "year old female with history of hypertension, Ménière's disease, chronic kidney disease stage III, recent admission for symptomatic hyponatremia who presents with difficulty ambulation and speaking.  Admitted on 4/2/2021 after found to have acute CVA. Was here just 5 days ago and diagnosised with hyponatremia   Existing Precautions/Restrictions fall   Limitations/Impairments hearing;safety/cognitive   Cognitive Status Examination   Orientation Status place;person   Affect/Mental Status (Cognitive) confused   Follows Commands follows one-step commands;0-24% accuracy;initiation impaired;increased processing time needed;delayed response/completion;verbal cues/prompting required   Cognitive Status Comments Pt with severe [place] making assessing cognitive difficult at times. Pt follows commands <50% of the time. Poor sequencing during mobility and ADLs. Tangential speech with word substitution. One occasional pt states, \"What is going on with my speech\" demonstrating insight into defcits. Unable to report PLOF or living arrangements.    Visual Perception   Visual Impairment/Limitations other (see comments)   Impact of Vision Impairment on Function (Vision) Needs further testing. Pt frequently looking to the L but could be from heading better out of R ear.    Sensory   Sensory Quick Adds No deficits were identified   Integumentary/Edema   Integumentary/Edema no deficits were identifed   Range of Motion Comprehensive   General Range of Motion no range of motion deficits identified   Comment, General Range of Motion Good ROM in B UE   Strength Comprehensive (MMT)   General Manual Muscle Testing (MMT) Assessment no strength deficits identified   Comment, General Manual Muscle Testing (MMT) Assessment Difficult to assess all planes of motion but good strength overall; no differences between R/L   Coordination   Upper Extremity Coordination   (Further assessment needed)   Bed Mobility   Bed Mobility supine-sit;scooting/bridging "   Scooting/Bridging [place] (Bed Mobility) supervision   Supine-Sit [place] (Bed Mobility) supervision   Transfers   Transfers sit-stand transfer;toilet transfer   Transfer Comments Min A for mobility with FWW   Activities of Daily Living   BADL Assessment/Intervention lower body dressing;toileting   Lower Body Dressing Assessment/Training   [place] Level (Lower Body Dressing) minimum assist (75% patient effort)   Toileting   [place] Level (Toileting) moderate assist (50% patient effort)   Clinical Impression   Criteria for Skilled Therapeutic Interventions Met (OT) yes;meets criteria;skilled treatment is necessary   OT Diagnosis Impaired ADLs and functional mobility, confused   OT Problem List-Impairments impacting ADL activity tolerance impaired;hearing;strength;cognition;balance;communication;mobility;post-surgical precautions;postural control   Assessment of Occupational Performance 3-5 Performance Deficits   Identified Performance Deficits decreased endurance and cognition for bathing, dressing and household chores   Planned Therapy Interventions (OT) ADL retraining;cognition;home program guidelines;progressive activity/exercise;balance training;strengthening;transfer training   Clinical Decision Making Complexity (OT) moderate complexity   Therapy Frequency (OT) 2x/day   Predicted Duration of Therapy 3   OT Discharge Planning    OT Discharge Recommendation (DC Rec) [facility] patient will benefit from intensive, interdisciplinary therapy.  Anticipate will be able to tolerate 3 hours of therapy per day   OT Rationale for DC Rec Pt is below baseline for ADLs and IADLs. Pt lives alone managing all IADLs according to chart. Pt does have neighbor and nephew who check on her. Pt currently would been 24/7 supervision and assist for mobility and ADLs/IADLs. Recommend [facility] for skilled therapy to increase indep.    Total Evaluation Time (Minutes)   Total Evaluation Time (Minutes) 10

## 2021-04-03 NOTE — PROVIDER NOTIFICATION
Page sent to hospitalist:    654 VT    MRI ordered, pt very confused.  Unsure if she can tolerate procedure, requesting sedative.    Thanks,  ARVIND Bailey     [phone]

## 2021-04-03 NOTE — PLAN OF CARE
[id number] A&O x to self. Pt is very confused and forgetful. Patient denies pain. VSS, on RA, besides HTN (Goal SBP <220). Ax1-2. Tele: SR. Plan for neuro consult, Brain MRI, SLP, and echo with bubble study per MD's note. Keep pt NPO until SLP has evaluated. Continue to Monitor.

## 2021-04-03 NOTE — PROGRESS NOTES
Clinical Swallow Evaluation:      04/03/21 1200   General Information   Onset of Illness/Injury or Date of Surgery 04/02/21   Referring Physician Dr. Hinojosa   Patient/Family Therapy Goal Statement (SLP) To eat/drink and to go home   Pertinent History of Current Problem Tanisha Muhammad is a 89 year old female with history of hypertension, Ménière's disease, chronic kidney disease stage III, recent admission for symptomatic hyponatremia who presents with difficulty ambulation and speaking.  Admitted on 4/2/2021 after found to have acute CVA of L PCA, occlussion of L P2 segment. NPO until SLP assessment given aphasia.   General Observations Confusion, perseveratively asking why she is here/can't go home, aphasia with ? apraxic components, turns head to the L to hear out of R ear given [place] though also ? vision changes   Past History of Dysphagia N/A   Type of Evaluation   Type of Evaluation Swallow Evaluation   Oral Motor   Oral Musculature generally intact   Structural Abnormalities none present   Mucosal Quality good   Dentition (Oral Motor)   Dentition (Oral Motor) natural dentition   Facial Symmetry (Oral Motor)   Facial Symmetry (Oral Motor) WNL   Lip Function (Oral Motor)   Lip Range of Motion (Oral Motor) WNL   Tongue Function (Oral Motor)   Tongue ROM (Oral Motor) WNL   Jaw Function (Oral Motor)   Jaw Function (Oral Motor) WNL   Cough/Swallow/Gag Reflex (Oral Motor)   Volitional Throat Clear/Cough (Oral Motor) WNL   Volitional Swallow (Oral Motor) WNL   Vocal Quality/Secretion Management (Oral Motor)   Vocal Quality (Oral Motor) WNL   General Swallowing Observations   Current Diet/Method of Nutritional Intake (General Swallowing Observations, NIS) NPO   Respiratory Support (General Swallowing Observations) none   Swallowing Evaluation Clinical swallow evaluation   Clinical Swallow Evaluation   Feeding Assistance set up only required   Clinical Swallow Evaluation Textures Trialed Thin Liquids;Solid Foods;Puree  Textures   Clinical Swallow Eval: Thin Liquid Texture Trial   Mode of Presentation, Thin Liquids cup;straw;self-fed   Volume of Liquid or Food Presented 6 oz   Oral Phase of Swallow WFL   Pharyngeal Phase of Swallow intact   Diagnostic Statement Timely swallow, no overt signs/sx aspiration noted   Clinical Swallow Evaluation: Puree Solid Texture Trial   Mode of Presentation, Puree spoon;self-fed   Volume of Puree Presented 2 oz   Oral Phase, Puree WFL   Pharyngeal Phase, Puree intact   Diagnostic Statement Timely swallow, no overt signs/sx aspiration noted   Clinical Swallow Evaluation: Solid Food Texture Trial   Mode of Presentation, Solid self-fed   Volume of Solid Food Presented 3 omar crackers   Oral Phase, Solid WFL   Pharyngeal Phase, Solid coughing/choking   Diagnostic Statement Functional mastication/oral clearance, timely swallow, x1 cough on first bite no other signs/sx aspiration noted   Swallowing Recommendations   Diet Consistency Recommendations regular diet;thin liquids   Medication Administration Recommendations, Swallowing (SLP) whole as tolerated   SLP Therapy Assessment/Plan   Criteria for Skilled Therapeutic Interventions Met (SLP Eval) no problems identified which require skilled intervention   SLP Diagnosis functional oropharyngeal swallow   Comment, Therapy Assessment/Plan (SLP) Pt without any appreciable oropharyngeal dysphagia on clinical swallow evaluation - no oromotor deficits, demonstrating complete mastication/oral clearance, good oral manipulation, timely swallows. x1 cough on first bite of solids, though no other clinical signs/sx aspiration or dysphagia on remaining 6 oz thin liquids, 2 oz puree solids, 3 oz regular solids. OK for regular/thin textures, and no further swallow tx needed.   Therapy Plan Review/Discharge Plan (SLP)   Therapy Plan Review (SLP) evaluation/treatment results reviewed;care plan/treatment goals reviewed;patient   SLP Discharge Planning    SLP Discharge  Recommendation (DC Rec)   (pending speech/language evaluation)   SLP Rationale for DC Rec no swallow barriers to d/c home, however notable communication deficits - further recs to follow communication assesssment.   SLP Brief overview of current status  no appreciable dysphagia at bedside: regular/thin textures when upright. communicaiton assessment needed    Total Evaluation Time   Total Evaluation Time (Minutes) 22

## 2021-04-03 NOTE — PROGRESS NOTES
[facility]    Medicine Progress Note - Hospitalist Service       Date of Admission:  4/2/2021  Assessment & Plan       Tanisha Muhammad is a 89 year old female with history of hypertension, Ménière's disease, chronic kidney disease stage III, recent admission for symptomatic hyponatremia who presents with difficulty ambulation and speaking.  Admitted on 4/2/2021 after found to have acute CVA.     Acute ischemic infarct, left PCA territory   Aphasia   Presents with difficulty with ambulation, difficulty speaking. Head CT with moderate sized evolving acute ischemic infarct in the left PCA territory. CTA head/neck with acute occlusion of the left P2 segment, otherwise no significant stenosis.    - MRI brain pending  - TTE-LVEF 65-70%.   - A1c 5.6%, LDL 68  - s/p Plavix load. Continue ASA + Plavix x 21 days, then ASA 325mg indefinately  - PT/OT/SLP  - 30 day Cardiac event monitor at discharge to eval for Afib  - Permissive hypertension; PRN anti-hypertensives ordered for severe elevations  - neuro following, appreciate help     Troponin elevation  Troponin 2.309. Patient denies chest pain, EKG without ischemic changes. Suspect secondary to CVA.  - troponin trended down to 1.697  - TTE- LVEF 65-70%, no WMA     Chronic kidney disease, stage 3  Baseline creatinine 1.1-1.2 with GFR in 40s. Creatinine 1.21 on admission.   - Currently around baseline  - Avoid nephrotoxins  - Monitor BMP       Hypertension  - Hold oral agents for permissive hypertension as above     History of hyponatremia  Recent admission 3/26-3/31 for hyponatremia with suspicion for polydipsia, sodium 131 at discharge.   - Sodium 134-138  - Monitor     Depression  Escitalopram recently discontinued due to hyponatremia.          Diet: Regular Diet Adult    DVT Prophylaxis: Pneumatic Compression Devices  Stack Catheter: not present  Code Status: Full Code           Disposition Plan   Expected discharge: 2 - 3 days, recommended to  possible TCU once stroke work up completed.  Entered: Otoniel Painting MD 04/03/2021, 1:23 PM       The patient's care was discussed with the Bedside Nurse and Patient.    Patient's Nephew, Ion, Updated by phone.     Otoniel Painting MD  Hospitalist Service  [facility]  Contact information available via [facility] Paging/Directory    ______________________________________________________________________    Interval History   Patient admitted overnight. See H & P by Dr. Hinojosa.     Patient still with aphasia and having difficulty expressing her self. She is not oriented to place or time. She is asking when she is going home. No chest pain.     Data reviewed today: I reviewed all medications, new labs and imaging results over the last 24 hours. I personally reviewed no images or EKG's today.    Physical Exam   Vital Signs: Temp: 97.7  F (36.5  C) Temp src: Oral BP: (!) 168/67 Pulse: 102   Resp: 15 SpO2: 99 % O2 Device: None (Room air)    Weight: 151 lbs 14.4 oz  See exam from H & P    Data   Recent Labs   Lab 04/03/21  1357 04/02/21  2336 04/02/21  2218 03/31/21  1135 03/31/21  0533 03/28/21  0608 03/28/21  0608   WBC  --   --  5.4  --   --   --  5.2   HGB  --   --  10.3*  --   --   --  11.5*   MCV  --   --  84  --   --   --  79   PLT  --   --  159  --   --   --  138*   INR  --  1.03  --   --   --   --   --      --  134 131* 132*   < > 125*   POTASSIUM 3.8  --  4.0  --  4.2   < > 4.1   CHLORIDE 108  --  104  --  105   < > 96   CO2 24  --  21  --  22   < > 24   BUN 23  --  30  --  20   < > 18   CR 1.13*  --  1.21*  --  1.07*   < > 1.02   ANIONGAP 6  --  9  --  5   < > 5   GALINA 10.1  --  9.8  --  9.1   < > 9.1   *  --  124*  --  101*   < > 107*   ALBUMIN  --   --  3.9  --   --   --   --    PROTTOTAL  --   --  7.7  --   --   --   --    BILITOTAL  --   --  0.5  --   --   --   --    ALKPHOS  --   --  65  --   --   --   --    ALT  --   --  39  --   --   --   --    AST  --   --   37  --   --   --   --    TROPI 1.697*  --  2.309*  --   --   --   --     < > = values in this interval not displayed.     Recent Results (from the past 24 hour(s))   CT Head w/o Contrast    Narrative    EXAM: CT HEAD W/O CONTRAST, CTA  HEAD NECK WITH CONTRAST  LOCATION: [facility]  DATE/TIME: 4/2/2021 11:49 PM    INDICATION: Confusion. Stroke code.  COMPARISON: 02/04/2021  CONTRAST: 70mL Isovue-370  TECHNIQUE: Head and neck CT angiogram with IV contrast. Noncontrast head CT followed by axial helical CT images of the head and neck vessels obtained during the arterial phase of intravenous contrast administration. Axial 2D reconstructed images and   multiplanar 3D MIP reconstructed images of the head and neck vessels were performed by the technologist. Dose reduction techniques were used.     FINDINGS:   NONCONTRAST HEAD CT:   INTRACRANIAL CONTENTS: Loss of gray-white differentiation in the inferomedial left temporal lobe involving the mesial temporal structures and inferior left occipital lobe compatible with an evolving ischemic infarct. No hemorrhage. Moderate presumed   chronic small vessel ischemic changes. Chronic infarcts in the right parietal/occipital junction and left parietal lobe. Chronic lacunar infarcts bilateral cerebellar hemispheres, right basal ganglia and left thalamus. Mild to moderate generalized volume   loss. No hydrocephalus.     VISUALIZED ORBITS/SINUSES/MASTOIDS: No intraorbital abnormality. Mild mucosal thickening scattered about the paranasal sinuses. No middle ear or mastoid effusion.    BONES/SOFT TISSUES: No acute abnormality.    HEAD CTA:  ANTERIOR CIRCULATION: No stenosis/occlusion, aneurysm, or high flow vascular malformation. Standard [place] anatomy.    POSTERIOR CIRCULATION: Acute occlusion of the left P2 segment. No aneurysm or AVM. Balanced vertebral arteries supply a normal basilar artery.     DURAL VENOUS SINUSES: Expected enhancement of the major dural  venous sinuses.    NECK CTA:  RIGHT CAROTID: No measurable stenosis or dissection. Luminal regularity of the ICA.    LEFT CAROTID: No measurable stenosis or dissection. Luminal regularity of the ICA.    VERTEBRAL ARTERIES: No focal stenosis or dissection. Balanced vertebral arteries.    AORTIC ARCH: Classic aortic arch anatomy with no significant stenosis at the origin of the great vessels.    NONVASCULAR STRUCTURES: Unremarkable.      Impression    IMPRESSION:   HEAD CT:  1.  Moderate-sized evolving acute ischemic infarct in the left PCA territory involving the mesial temporal structures and inferomedial left temporal and occipital junction. No hemorrhage.  2.  Chronic infarcts at the right parietal/occipital junction, left parietal cortex. Chronic lacunar infarcts in the left thalamus, bilateral basal ganglia and bilateral cerebellar hemispheres.    HEAD CTA:   1.  Acute occlusion of the left P2 segment.  2.  Otherwise unremarkable.    NECK CTA:  1.  No significant stenosis in the neck vessels based on NASCET criteria.  2.  No evidence for dissection.  3.  Luminal irregularity of the right greater than left internal carotid arteries consistent with fibromuscular dysplasia.    Noncontrast head CT and CTA head and neck were discussed with Dr. Louis at 23:57 hours.   CTA Head Neck with Contrast    Narrative    EXAM: CT HEAD W/O CONTRAST, CTA  HEAD NECK WITH CONTRAST  LOCATION: [facility]  DATE/TIME: 4/2/2021 11:49 PM    INDICATION: Confusion. Stroke code.  COMPARISON: 02/04/2021  CONTRAST: 70mL Isovue-370  TECHNIQUE: Head and neck CT angiogram with IV contrast. Noncontrast head CT followed by axial helical CT images of the head and neck vessels obtained during the arterial phase of intravenous contrast administration. Axial 2D reconstructed images and   multiplanar 3D MIP reconstructed images of the head and neck vessels were performed by the technologist. Dose reduction techniques were used.      FINDINGS:   NONCONTRAST HEAD CT:   INTRACRANIAL CONTENTS: Loss of gray-white differentiation in the inferomedial left temporal lobe involving the mesial temporal structures and inferior left occipital lobe compatible with an evolving ischemic infarct. No hemorrhage. Moderate presumed   chronic small vessel ischemic changes. Chronic infarcts in the right parietal/occipital junction and left parietal lobe. Chronic lacunar infarcts bilateral cerebellar hemispheres, right basal ganglia and left thalamus. Mild to moderate generalized volume   loss. No hydrocephalus.     VISUALIZED ORBITS/SINUSES/MASTOIDS: No intraorbital abnormality. Mild mucosal thickening scattered about the paranasal sinuses. No middle ear or mastoid effusion.    BONES/SOFT TISSUES: No acute abnormality.    HEAD CTA:  ANTERIOR CIRCULATION: No stenosis/occlusion, aneurysm, or high flow vascular malformation. Standard [place] anatomy.    POSTERIOR CIRCULATION: Acute occlusion of the left P2 segment. No aneurysm or AVM. Balanced vertebral arteries supply a normal basilar artery.     DURAL VENOUS SINUSES: Expected enhancement of the major dural venous sinuses.    NECK CTA:  RIGHT CAROTID: No measurable stenosis or dissection. Luminal regularity of the ICA.    LEFT CAROTID: No measurable stenosis or dissection. Luminal regularity of the ICA.    VERTEBRAL ARTERIES: No focal stenosis or dissection. Balanced vertebral arteries.    AORTIC ARCH: Classic aortic arch anatomy with no significant stenosis at the origin of the great vessels.    NONVASCULAR STRUCTURES: Unremarkable.      Impression    IMPRESSION:   HEAD CT:  1.  Moderate-sized evolving acute ischemic infarct in the left PCA territory involving the mesial temporal structures and inferomedial left temporal and occipital junction. No hemorrhage.  2.  Chronic infarcts at the right parietal/occipital junction, left parietal cortex. Chronic lacunar infarcts in the left thalamus, bilateral  basal ganglia and bilateral cerebellar hemispheres.    HEAD CTA:   1.  Acute occlusion of the left P2 segment.  2.  Otherwise unremarkable.    NECK CTA:  1.  No significant stenosis in the neck vessels based on NASCET criteria.  2.  No evidence for dissection.  3.  Luminal irregularity of the right greater than left internal carotid arteries consistent with fibromuscular dysplasia.    Noncontrast head CT and CTA head and neck were discussed with Dr. Louis at 23:57 hours.   Echocardiogram Complete - For age > 60 yrs    Narrative    [id number]  [id number]  [id number]^CHRISTINA^BHARATH^MARIELLA     [facility]  Echocardiography Laboratory  [address] [place] [address]     Name: PAULA CODY  MRN: [id number]  : 09/10/1926  Study Date: 2021 10:43 AM  Age: 89 yrs  Gender: Female  Patient [facility]  Reason For Study: Cerebrovascular Incident  Ordering Physician: BHARATH VASQUEZ  Referring Physician: Sesar Cuevas  Performed By: Ana Maria Smith     BSA: 1.7 m2  Height: 63 in  Weight: 151 lb  HR: 74  BP: 168/67 mmHg  ______________________________________________________________________________  Procedure  Complete Portable Echo Adult. Optison (NDC #[id number]) given intravenously.  ______________________________________________________________________________  Interpretation Summary     The left ventricle is normal in size.  The visual ejection fraction is estimated at 65-70%.  Diastolic Doppler findings (E/E' ratio and/or other parameters) suggest left  ventricular filling pressures are indeterminate.  No regional wall motion abnormalities noted.  No significant valvular heart disease.  ______________________________________________________________________________  Left Ventricle  The left ventricle is normal in size. There is normal left ventricular wall  thickness. The visual ejection fraction is estimated at 65-70%. Diastolic  Doppler findings (E/E' ratio and/or other  parameters) suggest left ventricular  filling pressures are indeterminate. No regional wall motion abnormalities  noted.     Right Ventricle  The right ventricle is normal in size and function.     Atria  The left atrium is borderline dilated. Right atrial size is normal. There is  no color Doppler evidence of an atrial shunt.     Mitral Valve  The mitral valve leaflets are mildly thickened. There is mild (1+) mitral  regurgitation.     Tricuspid Valve  There is mild (1+) tricuspid regurgitation. The right ventricular systolic  pressure is approximated at 24.5 mmHg plus the right atrial pressure. IVC  diameter and respiratory changes fall into an intermediate range suggesting an  RA pressure of 8 mmHg.     Aortic Valve  There is trivial trileaflet aortic sclerosis. There is trace aortic  regurgitation.     Pulmonic Valve  There is trace pulmonic valvular regurgitation.     Vessels  Normal size aorta. The aortic root is normal size.     Pericardium  There is no pericardial effusion.     Rhythm  Sinus rhythm was noted.  ______________________________________________________________________________  MMode/2D Measurements & Calculations  IVSd: 1.1 cm     LVIDd: 4.1 cm  LVIDs: 2.5 cm  LVPWd: 1.0 cm  FS: 39.3 %  LV mass(C)d: 142.8 grams  LV mass(C)dI: 83.2 grams/m2  Ao root diam: 2.9 cm  LA dimension: 3.6 cm  asc Aorta Diam: 3.4 cm  LA/Ao: 1.2  LA Volume (BP): 53.6 ml  LA Volume Index (BP): 31.2 ml/m2  RWT: 0.51     Doppler Measurements & Calculations  MV E max marisela: 61.6 cm/sec  MV A max marisela: 123.0 cm/sec  MV E/A: 0.50  MV dec slope: 370.0 cm/sec2  AI P1/2t: 457.9 msec  PA acc time: 0.11 sec  TR max marisela: 247.3 cm/sec  TR max P.5 mmHg  E/E' av.7  Lateral E/e': 10.9  Medial E/e': 8.4     ______________________________________________________________________________  Report approved by: Bernice Hernández 2021 12:43 PM           Medications     - MEDICATION INSTRUCTIONS -       - MEDICATION INSTRUCTIONS -          [START ON 4/4/2021] aspirin  81 mg Oral Daily    Or     [START ON 4/4/2021] aspirin  81 mg Oral or NG Tube Daily     [START ON 4/4/2021] clopidogrel  75 mg Oral or NG Tube Daily     perflutren diluted 1mL to 2mL with saline  9 mL Intravenous Once     sodium chloride (PF)  10 mL Intravenous Once     sodium chloride (PF)  3 mL Intracatheter Q8H

## 2021-04-03 NOTE — H&P
[facility]    History and Physical - Hospitalist Service       Date of Admission:  4/2/2021    Assessment & Plan   Tanisha Muhammad is a 89 year old female with history of hypertension, Ménière's disease, chronic kidney disease stage III, recent admission for symptomatic hyponatremia who presents with difficulty ambulation and speaking.  Admitted on 4/2/2021 after found to have acute CVA.    Acute ischemic infarct, left PCA territory   Aphasia   Presents with difficulty with ambulation, difficulty speaking. Head CT with moderate sized evolving acute ischemic infarct in the left PCA territory. CTA head/neck with acute occlusion of the left P2 segment, otherwise no significant stenosis.    - Neurology consulted  - MRI brain  - Echocardiogram with bubble  - Telemetry  - HgbA1c, lipid panel  - PT/OT/SLP  - NPO pending bedside RN swallow evaluation or SLP if necessary  - Aspirin + clopidogrel ordered per neurology recommendations   - Permissive hypertension; PRN anti-hypertensives ordered for severe elevations     Troponin elevation  Troponin 2.309. Patient denies chest pain, EKG without ischemic changes. Suspect secondary to CVA.  - Serial troponins  - Echocardiogram as above    Chronic kidney disease, stage 3  Baseline creatinine 1.1-1.2 with GFR in 40s. Creatinine 1.21 on admission.   - Currently around baseline  - Avoid nephrotoxins  - Monitor BMP      Hypertension  - Hold oral agents for permissive hypertension as above    History of hyponatremia  Recent admission 3/26-3/31 for hyponatremia with suspicion for polydipsia, sodium 131 at discharge.  Sodium 134 on admission.  - Monitor    Depression  Escitalopram recently discontinued due to hyponatremia.        Diet:   NPO pending bedside swallow evaluation   DVT Prophylaxis: Pneumatic Compression Devices  Stack Catheter: not present  Code Status:   Unable to discuss fully with patient due to aphasia, full code ordered per previous code  status    Disposition Plan   Admit to inpatient. Anticipate greater than or equal to 2 midnights prior to discharge.     Entered: Florentin Hinojosa MD 04/03/2021, 1:42 AM     The patient's care was discussed with the Patient     Florentin Hinojosa MD  [facility]    ______________________________________________________________________    Chief Complaint   Difficulty walking and speaking for 1 day    History of Present Illness   Tanisha Muhammad is a 89 year old female who presents with the above chief complaint.    History from the patient is limited due to her aphasia, though she is able to answer yes/no questions seemingly appropriately. History otherwise obtained from review of medical record and discussion with Emergency Department provider. The patient presents after a friend noted her to have difficulty walking as well as speaking incoherently.    In the Emergency Department, the patient was seen by Dr. Louis, with whom I discussed the patient's presenting symptoms and emergency department course.  Initial vital signs were a temperature of 97.9F, HR 94, /61, RR 18, SpO2 99% on room air.  A code stroke was called and work-up included head CT and CTA with acute ischemic infarct in the left PCA territory with acute occlusion of the left P2 segment. She was outside the window for acute intervention  Hospitalists were contacted for admission to the hospital.     The patient presently denies chest pain or shortness of breath.  She denies any focal numbness/tingling/weakness.  She denies any headache.  She was recently hospitalized here for symptomatic hyponatremia, which improved prior to discharge.    Review of Systems    Complete 10 point review of systems assessed and negative except as noted in HPI.    Past Medical History    I have reviewed this patient's medical history and updated it with pertinent information if needed.   Past Medical History:   Diagnosis Date     Cystocele  03/2010    midline     Elevated glucose      Gout 03/2010     [place]'s disease 2010     Inactive Ménière's disease     Left ear deafness     Post-menopausal bleeding 01/02/2014    possibly due to pessary, area of irritated skin visualized on exam with active light bleeding posterior to cervix. Will need bx if continues following estrogen cream and leaving pessary out for 3-4 weeks.      Rectocele      Trigeminal neuralgia 2015     Unspecified essential hypertension      Unspecified venous (peripheral) insufficiency        Past Surgical History   I have reviewed this patient's surgical history and updated it with pertinent information if needed.  Past Surgical History:   Procedure Laterality Date     C DENTAL CONSULTATION      Dr Kahlil CEJA EYE EXAM, EST PATIENT,COMPREHESV      DR white     CATARACT IOL, RT/LT      Left     LASER YAG CAPSULOTOMY Left 10/2/2015    Procedure: LASER YAG CAPSULOTOMY;  Surgeon: Pieter Rios MD;  Location: [facility]         Social History   I have reviewed this patient's social history and updated it with pertinent information if needed.  Social History     Tobacco Use     Smoking status: Never Smoker     Smokeless tobacco: Never Used   Substance Use Topics     Alcohol use: Yes     Alcohol/week: 0.0 standard drinks     Comment: one glass wine a month     Drug use: No     Family History   I have reviewed this patient's family history and updated it with pertinent information if needed.   Family History   Problem Relation Age of Onset     Cancer Mother         kidney     Hypertension Mother      Heart Disease Father      Respiratory Father         emphysema     Heart Disease Brother         MI     Arthritis Sister         fibromyalgia     Heart Disease Brother         stent 4     Cancer Brother         renal        Prior to Admission Medications   Prior to Admission Medications   Prescriptions Last Dose Informant Patient Reported? Taking?   amLODIPine (NORVASC) 10 MG tablet  Self No No    Sig: Take 1 tablet (10 mg) by mouth daily   estradiol (ESTRACE VAGINAL) 0.1 MG/GM vaginal cream  Self No No   Sig: Place 1 g vaginally twice a week Place 1g in vagina twice weekly   Patient taking differently: Place 1 g vaginally as needed Place 1g in vagina twice weekly PRN   losartan (COZAAR) 100 MG tablet  Self No No   Sig: TAKE 1 TABLET(100 MG) BY MOUTH DAILY   multivitamin, therapeutic (THERA-VIT) TABS tablet  Self Yes No   Sig: Take 1 tablet by mouth daily   torsemide (DEMADEX) 5 MG tablet  Self No No   Sig: Take 1 tablet (5 mg) by mouth daily Take in AM      Facility-Administered Medications: None     Allergies   Allergies   Allergen Reactions     Indomethacin      Cognitive impairment     Keflex [Cephalexin]      HIVES     Sulfamethoxazole-Trimethoprim      BACTRIM        Physical Exam   Vital Signs: Temp: 97.9  F (36.6  C) Temp src: Temporal BP: (!) 164/57 Pulse: 93   Resp: 15 SpO2: 97 %      Weight: 0 lbs 0 oz    Constitutional: NAD  Eyes: PERRL, EOMI  HENT: Oropharynx clear, MMM  Respiratory: Clear to auscultation bilaterally, good air movement, normal effort   Cardiovascular: RRR, no m/r/g. No peripheral edema.   GI: Soft, non-tender, non-distended. No rebound tenderness or guarding.   Skin: Warm, dry   Neurologic: Alert. Expressive aphasia. Able to respond yes/no seemingly appropriately. Difficulty following multi-step commands. Face symmetric. Tongue midline. 5/5 upper and lower extremity strength symmetric bilaterally. Unable to complete finger-nose-finger and heal-shin testing due to multi-step command  Psychiatric: Normal affect, appropriate      Data   Data reviewed today: I reviewed all medications, new labs and imaging results over the last 24 hours. I personally reviewed the EKG tracing showing sinus rhythm, no ischemic ST-T wave changes.    Recent Labs   Lab 04/02/21  2336 04/02/21  2218 03/31/21  1135 03/31/21  0533 03/30/21  0600 03/30/21  0600 03/28/21  0608 03/28/21  0608 03/27/21  0651  03/27/21  0651   WBC  --  5.4  --   --   --   --   --  5.2  --  4.6   HGB  --  10.3*  --   --   --   --   --  11.5*  --  10.7*   MCV  --  84  --   --   --   --   --  79  --  78   PLT  --  159  --   --   --   --   --  138*  --  117*   INR 1.03  --   --   --   --   --   --   --   --   --    NA  --  134 131* 132*   < > 129*   < > 125*   < > 122*   POTASSIUM  --  4.0  --  4.2  --  4.2   < > 4.1   < > 3.9   CHLORIDE  --  104  --  105  --  101   < > 96  --  94   CO2  --  21  --  22  --  22   < > 24  --  25   BUN  --  30  --  20  --  19   < > 18  --  11   CR  --  1.21*  --  1.07*  --  1.11*   < > 1.02  --  1.03   ANIONGAP  --  9  --  5  --  6   < > 5  --  3   GALINA  --  9.8  --  9.1  --  9.1   < > 9.1  --  8.8   GLC  --  124*  --  101*  --  91   < > 107*  --  121*   ALBUMIN  --  3.9  --   --   --   --   --   --   --   --    PROTTOTAL  --  7.7  --   --   --   --   --   --   --   --    BILITOTAL  --  0.5  --   --   --   --   --   --   --   --    ALKPHOS  --  65  --   --   --   --   --   --   --   --    ALT  --  39  --   --   --   --   --   --   --   --    AST  --  37  --   --   --   --   --   --   --   --    TROPI  --  2.309*  --   --   --   --   --   --   --   --     < > = values in this interval not displayed.       Recent Results (from the past 24 hour(s))   CT Head w/o Contrast    Narrative    EXAM: CT HEAD W/O CONTRAST, CTA  HEAD NECK WITH CONTRAST  LOCATION: [facility]  DATE/TIME: 4/2/2021 11:49 PM    INDICATION: Confusion. Stroke code.  COMPARISON: 02/04/2021  CONTRAST: 70mL Isovue-370  TECHNIQUE: Head and neck CT angiogram with IV contrast. Noncontrast head CT followed by axial helical CT images of the head and neck vessels obtained during the arterial phase of intravenous contrast administration. Axial 2D reconstructed images and   multiplanar 3D MIP reconstructed images of the head and neck vessels were performed by the technologist. Dose reduction techniques were used.     FINDINGS:   NONCONTRAST HEAD  CT:   INTRACRANIAL CONTENTS: Loss of gray-white differentiation in the inferomedial left temporal lobe involving the mesial temporal structures and inferior left occipital lobe compatible with an evolving ischemic infarct. No hemorrhage. Moderate presumed   chronic small vessel ischemic changes. Chronic infarcts in the right parietal/occipital junction and left parietal lobe. Chronic lacunar infarcts bilateral cerebellar hemispheres, right basal ganglia and left thalamus. Mild to moderate generalized volume   loss. No hydrocephalus.     VISUALIZED ORBITS/SINUSES/MASTOIDS: No intraorbital abnormality. Mild mucosal thickening scattered about the paranasal sinuses. No middle ear or mastoid effusion.    BONES/SOFT TISSUES: No acute abnormality.    HEAD CTA:  ANTERIOR CIRCULATION: No stenosis/occlusion, aneurysm, or high flow vascular malformation. Standard [place] anatomy.    POSTERIOR CIRCULATION: Acute occlusion of the left P2 segment. No aneurysm or AVM. Balanced vertebral arteries supply a normal basilar artery.     DURAL VENOUS SINUSES: Expected enhancement of the major dural venous sinuses.    NECK CTA:  RIGHT CAROTID: No measurable stenosis or dissection. Luminal regularity of the ICA.    LEFT CAROTID: No measurable stenosis or dissection. Luminal regularity of the ICA.    VERTEBRAL ARTERIES: No focal stenosis or dissection. Balanced vertebral arteries.    AORTIC ARCH: Classic aortic arch anatomy with no significant stenosis at the origin of the great vessels.    NONVASCULAR STRUCTURES: Unremarkable.      Impression    IMPRESSION:   HEAD CT:  1.  Moderate-sized evolving acute ischemic infarct in the left PCA territory involving the mesial temporal structures and inferomedial left temporal and occipital junction. No hemorrhage.  2.  Chronic infarcts at the right parietal/occipital junction, left parietal cortex. Chronic lacunar infarcts in the left thalamus, bilateral basal ganglia and bilateral  cerebellar hemispheres.    HEAD CTA:   1.  Acute occlusion of the left P2 segment.  2.  Otherwise unremarkable.    NECK CTA:  1.  No significant stenosis in the neck vessels based on NASCET criteria.  2.  No evidence for dissection.  3.  Luminal irregularity of the right greater than left internal carotid arteries consistent with fibromuscular dysplasia.    Noncontrast head CT and CTA head and neck were discussed with Dr. Louis at 23:57 hours.   CTA Head Neck with Contrast    Narrative    EXAM: CT HEAD W/O CONTRAST, CTA  HEAD NECK WITH CONTRAST  LOCATION: [facility]  DATE/TIME: 4/2/2021 11:49 PM    INDICATION: Confusion. Stroke code.  COMPARISON: 02/04/2021  CONTRAST: 70mL Isovue-370  TECHNIQUE: Head and neck CT angiogram with IV contrast. Noncontrast head CT followed by axial helical CT images of the head and neck vessels obtained during the arterial phase of intravenous contrast administration. Axial 2D reconstructed images and   multiplanar 3D MIP reconstructed images of the head and neck vessels were performed by the technologist. Dose reduction techniques were used.     FINDINGS:   NONCONTRAST HEAD CT:   INTRACRANIAL CONTENTS: Loss of gray-white differentiation in the inferomedial left temporal lobe involving the mesial temporal structures and inferior left occipital lobe compatible with an evolving ischemic infarct. No hemorrhage. Moderate presumed   chronic small vessel ischemic changes. Chronic infarcts in the right parietal/occipital junction and left parietal lobe. Chronic lacunar infarcts bilateral cerebellar hemispheres, right basal ganglia and left thalamus. Mild to moderate generalized volume   loss. No hydrocephalus.     VISUALIZED ORBITS/SINUSES/MASTOIDS: No intraorbital abnormality. Mild mucosal thickening scattered about the paranasal sinuses. No middle ear or mastoid effusion.    BONES/SOFT TISSUES: No acute abnormality.    HEAD CTA:  ANTERIOR CIRCULATION: No stenosis/occlusion,  aneurysm, or high flow vascular malformation. Standard [place] of [place] anatomy.    POSTERIOR CIRCULATION: Acute occlusion of the left P2 segment. No aneurysm or AVM. Balanced vertebral arteries supply a normal basilar artery.     DURAL VENOUS SINUSES: Expected enhancement of the major dural venous sinuses.    NECK CTA:  RIGHT CAROTID: No measurable stenosis or dissection. Luminal regularity of the ICA.    LEFT CAROTID: No measurable stenosis or dissection. Luminal regularity of the ICA.    VERTEBRAL ARTERIES: No focal stenosis or dissection. Balanced vertebral arteries.    AORTIC ARCH: Classic aortic arch anatomy with no significant stenosis at the origin of the great vessels.    NONVASCULAR STRUCTURES: Unremarkable.      Impression    IMPRESSION:   HEAD CT:  1.  Moderate-sized evolving acute ischemic infarct in the left PCA territory involving the mesial temporal structures and inferomedial left temporal and occipital junction. No hemorrhage.  2.  Chronic infarcts at the right parietal/occipital junction, left parietal cortex. Chronic lacunar infarcts in the left thalamus, bilateral basal ganglia and bilateral cerebellar hemispheres.    HEAD CTA:   1.  Acute occlusion of the left P2 segment.  2.  Otherwise unremarkable.    NECK CTA:  1.  No significant stenosis in the neck vessels based on NASCET criteria.  2.  No evidence for dissection.  3.  Luminal irregularity of the right greater than left internal carotid arteries consistent with fibromuscular dysplasia.    Noncontrast head CT and CTA head and neck were discussed with Dr. Louis at 23:57 hours.

## 2021-04-03 NOTE — PROVIDER NOTIFICATION
Spoke to hospitalist, MARIELA Rendon, to hold off on MRI unless there is an acute neuro change.  Will continue to monitor.

## 2021-04-03 NOTE — ED TRIAGE NOTES
Patient discharged Wednesday with hyponatremia. Tonight patient is more confused and complaining of hypertension and generalized weakness. Patients neighbor brought her in tonight for ongoing concern. Patient lives alone in [facility].

## 2021-04-03 NOTE — PROGRESS NOTES
Paged by RN as pt increasingly confused, but not agitated. MRI ordered, but pt unlikely to be able to lay flat for 45 minutes to complete imaging. Hesitant to order sedating medication in 89 year old as management will unlikely change. Reviewed CT and CTA findings. Given confusion, will hold off on MRI this evening, can reevaluate in the AM. Will order for PRN Seroquel if pt were to become agitated. If any acute neuro changes, would pursue STAT head CT.

## 2021-04-03 NOTE — PLAN OF CARE
Alert, disoriented to place, time and situation. Very forgetful, needs frequent re-orientation. VSS on room air. SBP for SBP >220. 's today. Tele: SR w/ 1st degree AVB. Denies CP and SOB. Neuros intact except word finding difficulty noted. Pt needs frequent explanation with neuro assessments d/t confusion. Plan for MRI of brain. Speech evaluated patient, regular diet with thin liquid okay for patient. SBA. Echo done, trops trending down. Pt transferring to neurology unit, all belongings have been sent with patient.  Report has been given to oncoming RN. Pt friend Soraya has been updated about transfer. New unit will be calling pt friend with patient's direct phone number. Attempted to call patient's nephew, unable to get in contact with patient's nephew.

## 2021-04-03 NOTE — PROGRESS NOTES
Speech/Language Assessment:      04/03/21 1623   General Information   Onset of Illness/Injury or Date of Surgery 04/02/21   Referring Physician Dr. Hinojosa   Pertinent History of Current Problem Tanisha Muhammad is a 89 year old female with history of hypertension, Ménière's disease, chronic kidney disease stage III, recent admission for symptomatic hyponatremia who presents with difficulty ambulation and speaking.  Admitted on 4/2/2021 after found to have acute CVA of L PCA, occlussion of L P2 segment.   Type of Evaluation   Type of Evaluation Speech, Language, Cognition   [place] Aphasia Battery- Revised Bedside Record From   Spontaneous Speech Content Score (out of 10) 1   Spontaneous Speech Fluency Score (out of 10) 5   Auditory Verbal Comprehension Score (out of 10) 4   Sequential Commands Score (out of 10) 0   Repetition Score (out of 10) 9   Object Naming Score (out of 10) 0   Bedside Aphasia Sum 19   WAB-R Bedside Aphasia Score 31.67   Bedside Aphasia Classification Transcortical Sensory Aphasia   Aphasia Severity Level Severe Aphasia   Pragmatic Language   Nonverbal Skills (Pragmatic Language) eye contact   Eye Contact, Nonverbal Skills (Pragmatic Language) impaired;moderate impairment;severe impairment   General Therapy Interventions   Planned Therapy Interventions Language   Language Auditory comprehension;Verbal expression   SLP Therapy Assessment/Plan   Criteria for Skilled Therapeutic Interventions Met (SLP Eval) yes;treatment indicated   SLP Diagnosis severe receptive and expressive aphasia, characterized as transcortical sensory aphasia on wetern aphasia battery bedside edition   Rehab Potential (SLP Eval) fair, will monitor progress closely   Therapy Frequency (SLP Eval) daily   Predicted Duration of Therapy Intervention (SLP Eval) 1 week   Comment, Therapy Assessment/Plan (SLP)   Pt presents with severe receptive and expressive aphasia, characterized as transcortical sensory aphasia on wetern  "aphasia battery bedside edition. Of note, pt very [place] - this combined with mandatory mask wearing for global pandemic may have altered auditory comprehension scores, however even with writer/pt self repetition of questions/commands, pt with continued deficits. Verbal expression notable for initial sound and part word dysfluencies, severe word finding difficulties, frequent use of non-existent words (e.g., [place]) and verbal/logopenic paraphasias. She does have intermittent spontaneous phrase-sentence length speech (e.g., \"why can't I?\" and \"this is the pits!\").      SLP Discharge Planning    SLP Discharge Recommendation (DC Rec) [facility]-Motivated patient will benefit from intensive, interdisciplinary therapy.  Anticipate will be able to tolerate 3 hours of therapy per day   SLP Rationale for DC Rec no swallow barriers however severe aphasia requiring intensive SLP intervention to return safely to home and community environments.   SLP Brief overview of current status  no appreciable dysphagia at bedside: regular/thin textures when upright. daily SLP for aphasia tx of verbal expression and auditory comprehension deficits.    Total Evaluation Time   Total Evaluation Time (Minutes) 28     "

## 2021-04-03 NOTE — CONSULTS
"[facility]    Stroke Consult Note    Reason for Consult:  CVA    Chief Complaint: Altered Mental Status and Hypertension       HPI  Tanisha Muhammad is a 89 year old female with past medical history significant for HTN, CKD, and recent admission for hyponatremia who was brought to the [facility] ED for evaluation of confusion. She was reportedly unable to walk on her own or answer basic questions. Imaging in the ED demonstrated a L P2 occlusion with resulting moderate sized evolving infarct in the left PCA territory. She was not a candidate for acute intervention due to LKW >4.5 hours.     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 We will continue to follow.     CRYSTAL Abdi, CNP  Neurology  To page me or covering stroke neurology team member, click here: AMCOM   Choose \"On Call\" tab at top, then search dropdown box for \"Neurology Adult\", select location, press Enter, then look for stroke/neuro ICU/telestroke.    _____________________________________________________    Past Medical History   Past " Medical History:   Diagnosis Date     Cystocele 03/2010    midline     Elevated glucose      Gout 03/2010     [place]'s disease 2010     Inactive Ménière's disease     Left ear deafness     Post-menopausal bleeding 01/02/2014    possibly due to pessary, area of irritated skin visualized on exam with active light bleeding posterior to cervix. Will need bx if continues following estrogen cream and leaving pessary out for 3-4 weeks.      Rectocele      Trigeminal neuralgia 2015     Unspecified essential hypertension      Unspecified venous (peripheral) insufficiency      Past Surgical History   Past Surgical History:   Procedure Laterality Date     C DENTAL CONSULTATION      Dr Kahlil CEJA EYE EXAM, EST PATIENT,COMPREHESV      DR white     CATARACT IOL, RT/LT      Left     LASER YAG CAPSULOTOMY Left 10/2/2015    Procedure: LASER YAG CAPSULOTOMY;  Surgeon: Pieter Rios MD;  Location: [facility]     Medications   Home Meds  Prior to Admission medications    Medication Sig Start Date End Date Taking? Authorizing Provider   amLODIPine (NORVASC) 10 MG tablet Take 1 tablet (10 mg) by mouth daily 2/11/21   Sesar Cuevas MD   estradiol (ESTRACE VAGINAL) 0.1 MG/GM vaginal cream Place 1 g vaginally twice a week Place 1g in vagina twice weekly  Patient taking differently: Place 1 g vaginally as needed Place 1g in vagina twice weekly PRN 7/25/19   Juan Pollard MD   losartan (COZAAR) 100 MG tablet TAKE 1 TABLET(100 MG) BY MOUTH DAILY 8/3/20   Ken Norton MD   multivitamin, therapeutic (THERA-VIT) TABS tablet Take 1 tablet by mouth daily    Unknown, Entered By History   torsemide (DEMADEX) 5 MG tablet Take 1 tablet (5 mg) by mouth daily Take in AM 2/11/21   Sesar Cuevas MD       Scheduled Meds    [START ON 4/4/2021] aspirin  81 mg Oral Daily    Or     [START ON 4/4/2021] aspirin  81 mg Oral or NG Tube Daily     [START ON 4/4/2021] clopidogrel  75 mg Oral or NG Tube Daily     sodium chloride (PF)  3 mL  Intracatheter Q8H       Infusion Meds    - MEDICATION INSTRUCTIONS -       - MEDICATION INSTRUCTIONS -         PRN Meds  acetaminophen, labetalol **OR** hydrALAZINE, lidocaine 4%, lidocaine (buffered or not buffered), - MEDICATION INSTRUCTIONS -, - MEDICATION INSTRUCTIONS -, ondansetron **OR** ondansetron, prochlorperazine **OR** prochlorperazine **OR** prochlorperazine, sodium chloride (PF), sodium chloride (PF)    Allergies   Allergies   Allergen Reactions     Indomethacin      Cognitive impairment     Keflex [Cephalexin]      HIVES     Sulfamethoxazole-Trimethoprim      BACTRIM      Family History   Family History   Problem Relation Age of Onset     Cancer Mother         kidney     Hypertension Mother      Heart Disease Father      Respiratory Father         emphysema     Heart Disease Brother         MI     Arthritis Sister         fibromyalgia     Heart Disease Brother         stent 4     Cancer Brother         renal      Social History   Social History     Tobacco Use     Smoking status: Never Smoker     Smokeless tobacco: Never Used   Substance Use Topics     Alcohol use: Yes     Alcohol/week: 0.0 standard drinks     Comment: one glass wine a month     Drug use: No       Review of Systems   The 10 point Review of Systems is negative other than noted in the HPI or here.        PHYSICAL EXAMINATION   Temp:  [97.9  F (36.6  C)] 97.9  F (36.6  C)  Pulse:  [] 87  Resp:  [14-20] 20  BP: (114-173)/(55-96) 162/78  SpO2:  [96 %-99 %] 97 %    Neurologic  Mental Status:  alert, follows some commands (unclear if poor hearing or truly unable to follow), speech is clear but obvious WFD and loss of fluency, no dysarthria  Cranial Nerves:  facial movements symmetric, no dysarthria, tongue protrusion midline, hard of hearing, suspect right field cut (can't follow for formal testing but does not blink to threat on right, primarily attends to left)  Motor:  normal muscle tone and bulk, no abnormal movements, able to move  all limbs spontaneously, mild RLE drift, no obvious drift in LLE or bilateral UEs  Reflexes:  toes down-going  Sensory:  light touch sensation intact and symmetric throughout upper and lower extremities  Coordination:  no obvious ataxia but does not follow for formal testing  Station/Gait:  deferred    Dysphagia Screen  Per Nursing    Stroke Scales    NIHSS  Interval baseline (04/03/21 0434)   Interval Comments     1a. Level of Consciousness 0-->Alert, keenly responsive   1b. LOC Questions 1-->Answers one question correctly   1c. LOC Commands 1-->Performs one task correctly   2.   Best Gaze 0-->Normal   3.   Visual 1-->Partial hemianopia   4.   Facial Palsy 0-->Normal symmetrical movements   5a. Motor Arm, Left 0-->No drift, limb holds 90 (or 45) degrees for full 10 secs   5b. Motor Arm, Right 0-->No drift, limb holds 90 (or 45) degrees for full 10 secs   6a. Motor Leg, Left 0-->No drift, leg holds 30 degree position for full 5 secs   6b. Motor Leg, right 1-->Drift, leg falls by the end of the 5-sec period but does not hit bed   7.   Limb Ataxia 0-->Absent   8.   Sensory 0-->Normal, no sensory loss   9.   Best Language 1-->Mild-to-moderate aphasia, some obvious loss of fluency or facility of comprehension, without significant limitation on ideas expressed or form of expression. Reduction of speech and/or comprehension, however, makes conversation. . . (see row details)   10. Dysarthria 0-->Normal   11. Extinction and Inattention  0-->No abnormality   Total 5 (04/03/21 1256)       Imaging  I personally reviewed all imaging; relevant findings per HPI.    Labs Data   CBC  Recent Labs   Lab 04/02/21 2218 03/28/21  0608   WBC 5.4 5.2   RBC 3.57* 3.94   HGB 10.3* 11.5*   HCT 29.9* 31.1*    138*     Basic Metabolic Panel   Recent Labs   Lab 04/02/21 2218 03/31/21  1135 03/31/21  0533 03/30/21  0600 03/30/21  0600    131* 132*   < > 129*   POTASSIUM 4.0  --  4.2  --  4.2   CHLORIDE 104  --  105  --  101   CO2  21  --  22  --  22   BUN 30  --  20  --  19   CR 1.21*  --  1.07*  --  1.11*   *  --  101*  --  91   GALINA 9.8  --  9.1  --  9.1    < > = values in this interval not displayed.     Liver Panel  Recent Labs   Lab 04/02/21 2218   PROTTOTAL 7.7   ALBUMIN 3.9   BILITOTAL 0.5   ALKPHOS 65   AST 37   ALT 39     INR    Recent Labs   Lab Test 04/02/21  2336   INR 1.03      Lipid Profile    Recent Labs   Lab Test 07/10/15 09/20/13  1015   CHOL 178 177   HDL 45* 42*    113   TRIG 121 113   CHOLHDLRATIO  --  4.2     A1C    Recent Labs   Lab Test 04/02/21 2218   A1C 5.6     Troponin I    Recent Labs   Lab 04/02/21 2218   TROPI 2.309*          Stroke Code / Stroke Consult Data Data This was a non-emergent, non-tele stroke consult.

## 2021-04-04 ENCOUNTER — APPOINTMENT (OUTPATIENT)
Dept: PHYSICAL THERAPY | Facility: CLINIC | Age: 86
DRG: 065 | End: 2021-04-04
Payer: MEDICARE

## 2021-04-04 ENCOUNTER — APPOINTMENT (OUTPATIENT)
Dept: OCCUPATIONAL THERAPY | Facility: CLINIC | Age: 86
DRG: 065 | End: 2021-04-04
Payer: MEDICARE

## 2021-04-04 ENCOUNTER — APPOINTMENT (OUTPATIENT)
Dept: SPEECH THERAPY | Facility: CLINIC | Age: 86
DRG: 065 | End: 2021-04-04
Payer: MEDICARE

## 2021-04-04 LAB
ANION GAP SERPL CALCULATED.3IONS-SCNC: 5 MMOL/L (ref 3–14)
BUN SERPL-MCNC: 26 MG/DL (ref 7–30)
CALCIUM SERPL-MCNC: 9.9 MG/DL (ref 8.5–10.1)
CHLORIDE SERPL-SCNC: 107 MMOL/L (ref 94–109)
CO2 SERPL-SCNC: 24 MMOL/L (ref 20–32)
CREAT SERPL-MCNC: 1.19 MG/DL (ref 0.52–1.04)
ERYTHROCYTE [DISTWIDTH] IN BLOOD BY AUTOMATED COUNT: 13 % (ref 10–15)
GFR SERPL CREATININE-BSD FRML MDRD: 39 ML/MIN/{1.73_M2}
GLUCOSE SERPL-MCNC: 98 MG/DL (ref 70–99)
HCT VFR BLD AUTO: 29.7 % (ref 35–47)
HGB BLD-MCNC: 10.3 G/DL (ref 11.7–15.7)
MCH RBC QN AUTO: 29.2 PG (ref 26.5–33)
MCHC RBC AUTO-ENTMCNC: 34.7 G/DL (ref 31.5–36.5)
MCV RBC AUTO: 84 FL (ref 78–100)
PLATELET # BLD AUTO: 172 10E9/L (ref 150–450)
POTASSIUM SERPL-SCNC: 4.3 MMOL/L (ref 3.4–5.3)
RBC # BLD AUTO: 3.53 10E12/L (ref 3.8–5.2)
SODIUM SERPL-SCNC: 136 MMOL/L (ref 133–144)
WBC # BLD AUTO: 4.4 10E9/L (ref 4–11)

## 2021-04-04 PROCEDURE — 97116 GAIT TRAINING THERAPY: CPT | Mod: GP

## 2021-04-04 PROCEDURE — 250N000013 HC RX MED GY IP 250 OP 250 PS 637: Performed by: INTERNAL MEDICINE

## 2021-04-04 PROCEDURE — 92507 TX SP LANG VOICE COMM INDIV: CPT | Mod: GN

## 2021-04-04 PROCEDURE — 85027 COMPLETE CBC AUTOMATED: CPT | Performed by: INTERNAL MEDICINE

## 2021-04-04 PROCEDURE — 80048 BASIC METABOLIC PNL TOTAL CA: CPT | Performed by: INTERNAL MEDICINE

## 2021-04-04 PROCEDURE — 97530 THERAPEUTIC ACTIVITIES: CPT | Mod: GP

## 2021-04-04 PROCEDURE — 99232 SBSQ HOSP IP/OBS MODERATE 35: CPT | Performed by: INTERNAL MEDICINE

## 2021-04-04 PROCEDURE — 97530 THERAPEUTIC ACTIVITIES: CPT | Mod: GO

## 2021-04-04 PROCEDURE — 36415 COLL VENOUS BLD VENIPUNCTURE: CPT | Performed by: INTERNAL MEDICINE

## 2021-04-04 PROCEDURE — 120N000001 HC R&B MED SURG/OB

## 2021-04-04 RX ORDER — AMLODIPINE BESYLATE 5 MG/1
5 TABLET ORAL DAILY
Status: DISCONTINUED | OUTPATIENT
Start: 2021-04-04 | End: 2021-04-04

## 2021-04-04 RX ORDER — AMLODIPINE BESYLATE 10 MG/1
10 TABLET ORAL DAILY
Status: DISCONTINUED | OUTPATIENT
Start: 2021-04-04 | End: 2021-04-06 | Stop reason: HOSPADM

## 2021-04-04 RX ORDER — LOSARTAN POTASSIUM 100 MG/1
100 TABLET ORAL DAILY
Status: DISCONTINUED | OUTPATIENT
Start: 2021-04-04 | End: 2021-04-06 | Stop reason: HOSPADM

## 2021-04-04 RX ADMIN — CLOPIDOGREL BISULFATE 75 MG: 75 TABLET ORAL at 10:15

## 2021-04-04 RX ADMIN — ASPIRIN 81 MG: 81 TABLET, DELAYED RELEASE ORAL at 10:15

## 2021-04-04 RX ADMIN — AMLODIPINE BESYLATE 10 MG: 10 TABLET ORAL at 12:33

## 2021-04-04 RX ADMIN — LOSARTAN POTASSIUM 100 MG: 100 TABLET, FILM COATED ORAL at 12:33

## 2021-04-04 ASSESSMENT — ACTIVITIES OF DAILY LIVING (ADL)
ADLS_ACUITY_SCORE: 22
ADLS_ACUITY_SCORE: 22
ADLS_ACUITY_SCORE: 21
ADLS_ACUITY_SCORE: 21
ADLS_ACUITY_SCORE: 17
ADLS_ACUITY_SCORE: 22

## 2021-04-04 NOTE — PLAN OF CARE
Pt here with L CVA, elevated trop levels, confusion.  Hx of htn, CKD stage 3. A&O 2-3, disoriented to place and sometimes situation. Neuros confused and forgetful, rambling, word finding difficulty with some aphasia, denies numbness/tingling.  Able to follow commands previously not understood. VS with HTN but under parameters. Tele NSR with 1 deg AVB. regular diet, thin liquids. Takes pills whole with water.  Up with assist x 1 GBW. Denies pain. Pt scoring green on the Aggression Stop Light Tool. Plan to discharge to ACU is pending, possibly tomorrow.  Stroke education and bill of rights reviewed with nephew.

## 2021-04-04 NOTE — CONSULTS
Care Management Initial Consult    General Information  Assessment completed with: Patient, Family,    Type of CM/SW Visit: Initial Assessment    Primary Care Provider verified and updated as needed:     Readmission within the last 30 days:        Reason for Consult: discharge planning  Advance Care Planning:            Communication Assessment  Patient's communication style: spoken language (English or Bilingual)    Hearing Difficulty or Deaf: yes   Wear Glasses or Blind: no    Cognitive  Cognitive/Neuro/Behavioral: .WDL except, orientation, speech  Level of Consciousness: confused, alert  Arousal Level: opens eyes spontaneously  Orientation: disoriented to, place  Mood/Behavior: anxious, cooperative  Best Language: 1 - Mild to moderate  Speech: rambling, slow, word-finding difficulty, spontaneous    Living Environment:   People in home: alone     Current living Arrangements: apartment      Able to return to prior arrangements: no       Family/Social Support:  Care provided by:    Provides care for:    Marital Status:   Other (specify)          Description of Support System: Supportive, Involved    Support Assessment: Adequate social supports, Lacks necessary supervision and assistance    Current Resources:   Patient receiving home care services: No     Community Resources:    Equipment currently used at home: none  Supplies currently used at home:      Employment/Financial:  Employment Status: retired        Financial Concerns: No concerns identified         Socioeconomic History     Marital status:      Spouse name: Not on file     Number of children: 1     Years of education: 15     Highest education level: Not on file   Occupational History     Occupation: [employer]     Tobacco Use     Smoking status: Never Smoker     Smokeless tobacco: Never Used   Substance and Sexual Activity     Alcohol use: Yes     Alcohol/week: 0.0 standard drinks     Comment: one glass wine a month     Drug  use: No     Sexual activity: Not Currently     Partners: Male     Birth control/protection: Post-menopausal             Additional Information:  Per  protocol for discharge needs. Patient admitted inpatient on 04/03/2021. Tentative date of discharge is 04/5/21. Reviewed chart, met with patient and nephew Ion to introduce self/role and discuss discharge needs. Per Ion, patient lived alone in a condominium 4 miles away from him. Ion reports patient was independent with ADLs prior to hospitalization. Reviewed ARU list with patient and Ion. Per Ion, prefers referral to  ARU. SW sent referral via [facility], to check bed availability. Discussed transportation and associated cost with Ion. Ion request HE at discharge.     PADDY Morel

## 2021-04-04 NOTE — PROGRESS NOTES
"BRIEF NUTRITION ASSESSMENT    REASON FOR ASSESSMENT:  Tanisha Muhammad is a 89 year old female seen by Registered Dietitian for Nutrition Admission Risk Screen Received -   Have you recently lost weight without trying in the last 6 months ? - \"unsure\"  Have you been eating poorly due to a decreased appetite ?- \"no\"    NUTRITION HISTORY:  History difficult to obtain from patient in the setting of recent CVA. From her, her nephew at bedside, and chart review was able to discern the following -->   - Lives independently in a condo where she has several very good friends.   - She eats well most days, typically preparing her own meals.   - She notes that her lunch is smaller, and dinner is a bit bigger.   - Her nephew tells me that she has been doing very well for herself as far as being able to keep up with her ADLs.   - Overall no concern for inadquate oral intake at baseline.     CURRENT DIET AND INTAKE:  Diet: Regular            Ordered a large meal for breakfast, with 75% intake. Pt states that it was a lot of food and she did not necessarily like all the items on the tray \"I ate what I wanted to\".     ANTHROPOMETRICS:  Height: 5' 3.5\"  Weight:  151 lbs 14.4 oz (68.9 kg)   Body mass index is 26.49 kg/m .   Weight Status: Overweight BMI 25-29.9  IBW:  53.4 kg   %IBW: 129%  Weight History: Wt fluctuates, but overall stable.   Wt Readings from Last 10 Encounters:   04/03/21 68.9 kg (151 lb 14.4 oz)   03/31/21 70.9 kg (156 lb 4.8 oz)   03/11/21 72.6 kg (160 lb)   03/04/21 67.6 kg (149 lb)   02/25/21 72.1 kg (159 lb)   01/25/21 68 kg (150 lb)   06/01/20 76.4 kg (168 lb 6.4 oz)   08/07/19 71.2 kg (157 lb)   07/25/19 70.3 kg (155 lb)   06/24/19 69.9 kg (154 lb)       LABS:  Reviewed     MALNUTRITION:  Patient does not meet two of the criteria necessary for diagnosing malnutrition.     NUTRITION INTERVENTION:  Nutrition Diagnosis:  No nutrition diagnosis at this time.    Implementation:  Nutrition Education: Per Provider " order if indicated  Medical food supplement: Ensure enlive bid between meals     FOLLOW UP/MONITORING:   Will re-evaluate in 7 - 10 days, or sooner, if re-consulted.    Nia Cueto RD, LD  [place], [address], [facility]   Pager: [phone]  Weekend Pager: [phone]

## 2021-04-04 NOTE — PROGRESS NOTES
Pt here with acute L CVA, elevated troponin levels and confusion, history of hypertension, Ménière's disease, chronic kidney disease stage III. A&O to self only, very confused . Neuros - very confused, inconsistent with commands such as finger touch and heel drag. VS with hypertension, under 220 mm systolic parameters. Regular diet, thin liquids. Up with assist x1 GBW. Denies pain. Pt unable to comprehend stroke education; nephmirza Lemon contacted by phone and agreed to review with nurse tomorrow morning when he visits pt.  Pt scoring green on the Aggression Stop Light Tool. Plan to discharge pending further evaluation. Belongings collected and documented which include: shoes, clothing, jacket, and watch stored bedside in room.  Ring is on pt finger.

## 2021-04-04 NOTE — PHARMACY-CONSULT NOTE
Pharmacy Consult to evaluate for medication related stroke core measures    Tanisha Muhammad, 89 year old female admitted for 4/2/21 on 4/2/2021.    Thrombolytic was not given because of Time from onset contraindications    VTE Prophylaxis: PCD's    Antithrombotic: clopidogrel/aspirin started on 4/3, as appropriate by end of hospital day 2. Continue antithrombotic therapy on discharge to meet quality measures, unless contraindicated.  ASA + Plavix x 21 days, then ASA 81 mg indefinitely    Anticoagulation if history of A-fib/flutter: Patient does not have history of A-fib/flutter - anticoagulation not required for medication related stroke core measures.     LDL/HDL=68/68 on 4/3/21. No statin necessary at this time per neuro.     Patient currently receiving pending neuro input continue statin on discharge to meet quality measures, unless contraindicated.    Recommendations: None at this time    Thank you for the consult.    Dede Lee [facility], PharmD 4/4/2021 11:10 AM

## 2021-04-04 NOTE — PROGRESS NOTES
[facility]    Medicine Progress Note - Hospitalist Service       Date of Admission:  4/2/2021  Assessment & Plan       Tanisha Muhammad is a 89 year old female with history of hypertension, Ménière's disease, chronic kidney disease stage III, recent admission for symptomatic hyponatremia who presents with difficulty ambulation and speaking.  Admitted on 4/2/2021 after found to have acute CVA.     Acute ischemic infarct, left PCA territory   Aphasia   Presents with difficulty with ambulation, difficulty speaking. Head CT with moderate sized evolving acute ischemic infarct in the left PCA territory. CTA head/neck with acute occlusion of the left P2 segment, otherwise no significant stenosis.    - TTE-LVEF 65-70%.   - A1c 5.6%, LDL 68  - s/p Plavix load. Continue ASA + Plavix x 21 days, then ASA 81 mg alone indefinately  - PT/OT/SLP-->rec ARU  - LDL 68. Discussed with neuro not recommending statin at this time  - 30 day Cardiac event monitor at discharge to eval for Afib  - did not tolerate MRI--discussed with neurology, will cancel MRI  - resume blood pressure medications as below  - neuro following, appreciate help  - f/u with neuro in 6-8 weeks, [facility] Neurology clinic [phone]     Troponin elevation  Troponin 2.309. Patient denies chest pain, EKG without ischemic changes. Suspect secondary to CVA.  - troponin trended down to 1.697  - TTE- LVEF 65-70%, no WMA     Chronic kidney disease, stage 3  Baseline creatinine 1.1-1.2 with GFR in 40s. Creatinine 1.21 on admission.   - Currently around baseline  - Avoid nephrotoxins  - Monitor BMP       Hypertension  - resume PTA Losartan 100mg daily  -Resume PTA amlodipine 10 mg daily  -Continue to hold torsemide today, may be resumed tomorrow if blood pressure stable     History of hyponatremia  Recent admission 3/26-3/31 for hyponatremia with suspicion for polydipsia, sodium 131 at discharge.   - Sodium 134-138  -  Monitor     Depression  Escitalopram recently discontinued due to hyponatremia.          Diet: Regular Diet Adult    DVT Prophylaxis: Pneumatic Compression Devices  Stack Catheter: not present  Code Status: Full Code           Disposition Plan   Expected discharge: 1 to 2 days, recommended to ARU once once ARU bed availalbe.  Entered: Otoniel Painting MD 04/04/2021, 11:13 AM       The patient's care was discussed with the Bedside Nurse and Patient.      Otoniel Painting MD  Hospitalist Service  [facility]  Contact information available via [facility] Paging/Directory    ______________________________________________________________________    Interval History   Patient's nephew Ion at bedside.   Patient denies pain, headache, nausea or vomiting.   Speech is improved, but still has ongoing difficulty saying things what she wants to stay.    Data reviewed today: I reviewed all medications, new labs and imaging results over the last 24 hours. I personally reviewed no images or EKG's today.    Physical Exam   Vital Signs: Temp: 98.8  F (37.1  C) Temp src: Oral BP: (!) 149/72 Pulse: 94   Resp: 16 SpO2: 96 % O2 Device: None (Room air)    Weight: 151 lbs 14.4 oz  General: alert, awake and no apparent distress, knows in hospital, could tell me which one, knows it's holiday today  Resp: clear to auscultation bilaterally, no wheezing  CVS: regular rate and rhythm  Abd: soft and non-tender  Ext:  No LE edema      Data   Recent Labs   Lab 04/04/21  0704 04/03/21  1357 04/02/21  2336 04/02/21  2218   WBC 4.4  --   --  5.4   HGB 10.3*  --   --  10.3*   MCV 84  --   --  84     --   --  159   INR  --   --  1.03  --     138  --  134   POTASSIUM 4.3 3.8  --  4.0   CHLORIDE 107 108  --  104   CO2 24 24  --  21   BUN 26 23  --  30   CR 1.19* 1.13*  --  1.21*   ANIONGAP 5 6  --  9   GALINA 9.9 10.1  --  9.8   GLC 98 181*  --  124*   ALBUMIN  --   --   --  3.9   PROTTOTAL  --   --   --  7.7    BILITOTAL  --   --   --  0.5   ALKPHOS  --   --   --  65   ALT  --   --   --  39   AST  --   --   --  37   TROPI  --  1.697*  --  2.309*     No results found for this or any previous visit (from the past 24 hour(s)).  Medications     - MEDICATION INSTRUCTIONS -       - MEDICATION INSTRUCTIONS -         aspirin  81 mg Oral Daily    Or     aspirin  81 mg Oral or NG Tube Daily     clopidogrel  75 mg Oral or NG Tube Daily     sodium chloride (PF)  3 mL Intracatheter Q8H

## 2021-04-04 NOTE — PROGRESS NOTES
"  [facility]    Stroke Progress Note    Interval Events  Significant improvement in exam. Speech is now nearly back to baseline, able to follow commands.     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 Defer statin initiation at this time  - A1c 5.6, within goal <7.0  - Okay to restart home BP regimen. Goal BP <140/90 with tighter control associated with decreased overall CV risk, if tolerated  - Therapies  - PLC  - Discharge with 30 day cardiac event monitor to evaluate for atrial fibrillation    Follow-Up:  - 1-2 weeks with PCP   - in 6-8 weeks with [facility] clinic [phone] (neurology referral order placed)      Thank you for this consult. No further stroke evaluation is indicated, we will sign off. Please contact us with additional questions.       CRYSTAL Abdi, [facility]  Neurology  To page me or covering stroke neurology team member, click here: AMCOM   Choose \"On Call\" tab at top, then search dropdown box for \"Neurology Adult\", select location, press Enter, then look for stroke/neuro ICU/telestroke.    ______________________________________________________    Medications   Home Meds  Prior to Admission medications    Medication Sig Start Date End Date Taking? Authorizing Provider   amLODIPine (NORVASC) 10 MG tablet Take 1 tablet (10 mg) by mouth daily 2/11/21  Yes Sesar Cuevas MD   estradiol (ESTRACE VAGINAL) 0.1 MG/GM vaginal cream Place 1 g vaginally twice a week Place 1g in vagina twice weekly  Patient taking differently: Place 1 g vaginally as needed Place 1g in vagina twice weekly PRN 7/25/19  Yes Juan Pollard MD   losartan (COZAAR) 100 MG tablet TAKE 1 TABLET(100 MG) BY MOUTH DAILY " 8/3/20  Yes Ken Norton MD   multivitamin, therapeutic (THERA-VIT) TABS tablet Take 1 tablet by mouth daily   Yes Unknown, Entered By History   torsemide (DEMADEX) 5 MG tablet Take 1 tablet (5 mg) by mouth daily Take in AM 2/11/21  Yes Sesar Cuevas MD       Scheduled Meds    amLODIPine  10 mg Oral Daily     aspirin  81 mg Oral Daily    Or     aspirin  81 mg Oral or NG Tube Daily     clopidogrel  75 mg Oral or NG Tube Daily     losartan  100 mg Oral Daily     sodium chloride (PF)  3 mL Intracatheter Q8H       Infusion Meds    - MEDICATION INSTRUCTIONS -       - MEDICATION INSTRUCTIONS -         PRN Meds  acetaminophen, labetalol **OR** hydrALAZINE, lidocaine 4%, lidocaine (buffered or not buffered), - MEDICATION INSTRUCTIONS -, - MEDICATION INSTRUCTIONS -, ondansetron **OR** ondansetron, prochlorperazine **OR** prochlorperazine **OR** prochlorperazine, QUEtiapine, sodium chloride (PF), sodium chloride (PF)       PHYSICAL EXAMINATION  Temp:  [97  F (36.1  C)-98.8  F (37.1  C)] 97.5  F (36.4  C)  Pulse:  [89-98] 89  Resp:  [14-16] 16  BP: (128-174)/(72-76) 128/73  SpO2:  [96 %-99 %] 97 %     Neurologic  Mental Status:  alert, able to state month but not age,cannot state why she is in the hospital, follows commands, speech clear and fluent  Cranial Nerves:  PERRL, EOMI with normal smooth pursuit, facial movements symmetric, no dysarthria, tongue protrusion midline, extremely [place], right visual field deficit  Motor:  normal muscle tone and bulk, no abnormal movements, able to move all limbs spontaneously, no pronator drift  Reflexes:  toes down-going  Sensory:  light touch sensation intact and symmetric throughout upper and lower extremities  Coordination:  no obvious ataxia  Station/Gait:  deferred       Imaging  I personally reviewed all imaging; relevant findings per HPI.     Lab Results Data   CBC  Recent Labs   Lab 04/04/21  0704 04/02/21  2218   WBC 4.4 5.4   RBC 3.53* 3.57*   HGB 10.3* 10.3*   HCT  29.7* 29.9*    159     Basic Metabolic Panel    Recent Labs   Lab 04/04/21  0704 04/03/21  1357 04/02/21  2218    138 134   POTASSIUM 4.3 3.8 4.0   CHLORIDE 107 108 104   CO2 24 24 21   BUN 26 23 30   CR 1.19* 1.13* 1.21*   GLC 98 181* 124*   GALINA 9.9 10.1 9.8     Liver Panel  Recent Labs   Lab 04/02/21  2218   PROTTOTAL 7.7   ALBUMIN 3.9   BILITOTAL 0.5   ALKPHOS 65   AST 37   ALT 39     INR    Recent Labs   Lab Test 04/02/21  2336   INR 1.03      Lipid Profile    Recent Labs   Lab Test 04/03/21  1357 07/10/15 09/20/13  1015   CHOL 146 178 177   HDL 68 45* 42*   LDL 68 109 113   TRIG 52 121 113   CHOLHDLRATIO  --   --  4.2     A1C    Recent Labs   Lab Test 04/02/21 2218   A1C 5.6     Troponin I    Recent Labs   Lab 04/03/21  1357 04/02/21  2218   TROPI 1.697* 2.309*

## 2021-04-05 ENCOUNTER — APPOINTMENT (OUTPATIENT)
Dept: SPEECH THERAPY | Facility: CLINIC | Age: 86
DRG: 065 | End: 2021-04-05
Payer: MEDICARE

## 2021-04-05 ENCOUNTER — APPOINTMENT (OUTPATIENT)
Dept: PHYSICAL THERAPY | Facility: CLINIC | Age: 86
DRG: 065 | End: 2021-04-05
Payer: MEDICARE

## 2021-04-05 ENCOUNTER — APPOINTMENT (OUTPATIENT)
Dept: OCCUPATIONAL THERAPY | Facility: CLINIC | Age: 86
DRG: 065 | End: 2021-04-05
Payer: MEDICARE

## 2021-04-05 ENCOUNTER — PATIENT OUTREACH (OUTPATIENT)
Dept: CARE COORDINATION | Facility: CLINIC | Age: 86
End: 2021-04-05

## 2021-04-05 PROCEDURE — 97116 GAIT TRAINING THERAPY: CPT | Mod: GP

## 2021-04-05 PROCEDURE — 250N000013 HC RX MED GY IP 250 OP 250 PS 637: Performed by: INTERNAL MEDICINE

## 2021-04-05 PROCEDURE — 92507 TX SP LANG VOICE COMM INDIV: CPT | Mod: GN

## 2021-04-05 PROCEDURE — 99232 SBSQ HOSP IP/OBS MODERATE 35: CPT | Performed by: HOSPITALIST

## 2021-04-05 PROCEDURE — 97530 THERAPEUTIC ACTIVITIES: CPT | Mod: GP

## 2021-04-05 PROCEDURE — 97535 SELF CARE MNGMENT TRAINING: CPT | Mod: GO | Performed by: OCCUPATIONAL THERAPIST

## 2021-04-05 PROCEDURE — 120N000001 HC R&B MED SURG/OB

## 2021-04-05 RX ADMIN — ASPIRIN 81 MG CHEWABLE TABLET 81 MG: 81 TABLET CHEWABLE at 10:55

## 2021-04-05 RX ADMIN — AMLODIPINE BESYLATE 10 MG: 10 TABLET ORAL at 10:55

## 2021-04-05 RX ADMIN — LOSARTAN POTASSIUM 100 MG: 100 TABLET, FILM COATED ORAL at 10:55

## 2021-04-05 RX ADMIN — CLOPIDOGREL BISULFATE 75 MG: 75 TABLET ORAL at 10:54

## 2021-04-05 ASSESSMENT — ACTIVITIES OF DAILY LIVING (ADL)
ADLS_ACUITY_SCORE: 16
ADLS_ACUITY_SCORE: 21
ADLS_ACUITY_SCORE: 21
DEPENDENT_IADLS:: INDEPENDENT
ADLS_ACUITY_SCORE: 16

## 2021-04-05 NOTE — PROGRESS NOTES
Pt here with L CVA, elevated trop levels, confusion.  Hx of htn, CKD stage 3. A&O x2 disoriented to place and situation. Neuros confused and forgetful, rambling, word finding difficulty with some aphasia.  Able to follow all commands, sometimes needs prompting and demonstration. New IV placed on shift, patent and saline locked.  VS with elevated BP but under parameters. Tele NSR with 1 deg AVB. Regular diet, thin liquids. Takes pills whole with water.  Up with assist x 1 GBW. Denies pain. Pt scoring green on the Aggression Stop Light Tool. May discharge to ARU tomorrow.

## 2021-04-05 NOTE — PROGRESS NOTES
Clinic Care Coordination Contact  Ambulatory Care Coordination to Inpatient Care Management   Hand-In Communication    Date:  April 5, 2021  Name: Tanisha Muhammad is enrolled in Ambulatory Care Coordination program and I am the Lead Care Coordinator.  CC Contact Information: Epic [employer] Anal[employer]ket + phone  Payor Source: Payor: MEDICARE / Plan: RR MEDICARE / Product Type: Medicare /   Current services in place: None   Please see the CC Snaphot and Care Management Flowsheets for specific details of this Tanisha Muhammad care plan.   Additional details/specific concerns r/t this admission:    Home Safety (lives alone), Discharge Disposition (recommend higher level of care, would benefit from additional supervision and assistance in the home), Cognitive Impairment, and Readmission Risk 65%    I will follow this admission in Epic. Please feel free to contact me with questions or for further collaboration in discharge planning.    Mick Bhagat RN  Clinic Care Coordinator  [facility]  [facility]  Ph: [phone]

## 2021-04-05 NOTE — PROGRESS NOTES
Care Management Follow Up    Length of Stay (days): 2    Expected Discharge Date: 04/05/21     Concerns to be Addressed:       Patient plan of care discussed at interdisciplinary rounds: Yes    Anticipated Discharge Disposition:  [facility] ARU      Anticipated Discharge Services:  Inpatient acute rehab  Anticipated Discharge DME:      Patient/family educated on Medicare website which has current facility and service quality ratings:    Education Provided on the Discharge Plan:  SW met with pt to discuss ARU and pt requested [facility]. Referral not sent. SW sent referral and called [facility] to mention pt to [facility] liaison as being ready for discharge once bed is available .  Patient/Family in Agreement with the Plan:      Referrals Placed by CM/FAUSTINO:  [facility]   Private pay costs discussed: Not applicable    Additional Information:  [facility]U liaison assessing pt for admission.   [facility]U liaison indicates that they have declined pt for an acute setting for rehab. Pt is expected to need a longer rehab with SLP and perhaps need TCU regardless.  FAUSTINO updated nephew, Ion and pt.   They have requested that TCU referrals go to [facility] for [facility].  Referrals sent.     PADDY Banks  [facility]  [phone]

## 2021-04-05 NOTE — PLAN OF CARE
DATE & TIME: 4.4.21 2300-0700    Cognitive Concerns/ Orientation : AO 2-3  BEHAVIOR & AGGRESSION TOOL COLOR: Green   ABNL VS/O2: VSS ex HTN  MOBILITY: A1 GBW  PAIN MANAGMENT: Denies  DIET: Regular  BOWEL/BLADDER: Continent  ABNL LAB/BG: Creatinine 1.19 : Hbg 10.3  DRAIN/DEVICES: PIV SL  TELEMETRY RHYTHM: SR w. 1 Degree AV Block  SKIN: Bruised  TESTS/PROCEDURES: NA  D/C DAY/GOALS/PLACE: Possibly today SW working on bed placement to ARU  OTHER IMPORTANT INFO: pt here for elevated trop and left acute CVA. Diminished hearing on both sides. Disoriented to situation and time. Neuros otherwise intact.

## 2021-04-05 NOTE — PLAN OF CARE
/75 (BP Location: Left arm)   Pulse 82   Temp 98.8  F (37.1  C) (Oral)   Resp 16   Wt 68.9 kg (151 lb 14.4 oz)   SpO2 97%   BMI 26.49 kg/m      Nursing shift note  Summary: Patient in with a stroke  Alertness: Disoriented to place and time  Neuro: Intact, speech clear  Cardiac: Tele  Resp: WDL  GI: WDL  : WDL  CMS: WDL  Mobility: A1 GBW  Pain: None  Diet: Regular  Skin: WDL  Other Important Info: Waiting for placement      Behavior & Aggression Tool color:  -Green    Pt's belongings:     (Belongings from admission day present in pt's room)    Home medications:   -None

## 2021-04-05 NOTE — PROGRESS NOTES
[facility]    Medicine Progress Note - Hospitalist Service       Date of Admission:  4/2/2021  Assessment & Plan       Tanisha Muhammad is a 89 year old female with history of hypertension, Ménière's disease, chronic kidney disease stage III, recent admission for symptomatic hyponatremia who presents with difficulty ambulation and speaking.  Admitted on 4/2/2021 after found to have acute CVA.     Acute ischemic infarct, left PCA territory   Aphasia   Presents with difficulty with ambulation, difficulty speaking. Head CT with moderate sized evolving acute ischemic infarct in the left PCA territory. CTA head/neck with acute occlusion of the left P2 segment, otherwise no significant stenosis.    - TTE-LVEF 65-70%.   - A1c 5.6%, LDL 68  - s/p Plavix load. Continue ASA + Plavix x 21 days, then ASA 81 mg alone indefinately  - PT/OT/SLP-->rec ARU  - LDL 68. Discussed with neuro not recommending statin at this time  - 30 day Cardiac event monitor at discharge to eval for Afib  - did not tolerate MRI--discussed with neurology, will cancel MRI  - resume blood pressure medications as below  - neuro following, appreciate help  - f/u with neuro in 6-8 weeks, [facility] Neurology clinic [phone]     Troponin elevation  Troponin 2.309. Patient denies chest pain, EKG without ischemic changes. Suspect secondary to CVA.  - troponin trended down to 1.697  - TTE- LVEF 65-70%, no WMA     Chronic kidney disease, stage 3  Baseline creatinine 1.1-1.2 with GFR in 40s. Creatinine 1.21 on admission.   - Currently around baseline  - Avoid nephrotoxins  - Monitor BMP       Hypertension  - resume PTA Losartan 100mg daily  -Resume PTA amlodipine 10 mg daily  -Continue to hold torsemide today, may be resumed tomorrow if blood pressure stable     History of hyponatremia  Recent admission 3/26-3/31 for hyponatremia with suspicion for polydipsia, sodium 131 at discharge.   - Sodium 134-138  -  Monitor     Depression  Escitalopram recently discontinued due to hyponatremia.          Diet: Regular Diet Adult  Snacks/Supplements Adult: Ensure Enlive; Between Meals    DVT Prophylaxis: Pneumatic Compression Devices  Stack Catheter: not present  Code Status: Full Code           Disposition Plan   Expected discharge: 1 to 2 days, recommended to ARU once avaiable.  Entered: Domenico Cm DO 04/05/2021, 2:20 PM       The patient's care was discussed with the Bedside Nurse and Patient.      Domenico Cm DO  Hospitalist Service  [facility]  Contact information available via [facility] Paging/Directory    ______________________________________________________________________    Interval History   Records reviewed  Some speech issues, but good spirits.  Awaiting ARU    Data reviewed today: I reviewed all medications, new labs and imaging results over the last 24 hours. I personally reviewed no images or EKG's today.    Physical Exam   Vital Signs: Temp: 97.9  F (36.6  C) Temp src: Oral BP: 109/59 Pulse: 88   Resp: 16 SpO2: 100 % O2 Device: None (Room air)    Weight: 151 lbs 14.4 oz  General: alert, awake and no apparent distress, knows in hospital, could tell me which one, knows it's holiday today  Resp: clear to auscultation bilaterally, no wheezing  CVS: regular rate and rhythm  Abd: soft and non-tender  Ext:  No LE edema      Data   Recent Labs   Lab 04/04/21  0704 04/03/21  1357 04/02/21  2336 04/02/21  2218   WBC 4.4  --   --  5.4   HGB 10.3*  --   --  10.3*   MCV 84  --   --  84     --   --  159   INR  --   --  1.03  --     138  --  134   POTASSIUM 4.3 3.8  --  4.0   CHLORIDE 107 108  --  104   CO2 24 24  --  21   BUN 26 23  --  30   CR 1.19* 1.13*  --  1.21*   ANIONGAP 5 6  --  9   GALINA 9.9 10.1  --  9.8   GLC 98 181*  --  124*   ALBUMIN  --   --   --  3.9   PROTTOTAL  --   --   --  7.7   BILITOTAL  --   --   --  0.5   ALKPHOS  --   --   --  65   ALT  --   --   --   39   AST  --   --   --  37   TROPI  --  1.697*  --  2.309*     No results found for this or any previous visit (from the past 24 hour(s)).  Medications     - MEDICATION INSTRUCTIONS -         amLODIPine  10 mg Oral Daily     aspirin  81 mg Oral Daily    Or     aspirin  81 mg Oral or NG Tube Daily     clopidogrel  75 mg Oral or NG Tube Daily     losartan  100 mg Oral Daily     sodium chloride (PF)  3 mL Intracatheter Q8H

## 2021-04-06 ENCOUNTER — APPOINTMENT (OUTPATIENT)
Dept: OCCUPATIONAL THERAPY | Facility: CLINIC | Age: 86
DRG: 065 | End: 2021-04-06
Payer: MEDICARE

## 2021-04-06 ENCOUNTER — APPOINTMENT (OUTPATIENT)
Dept: PHYSICAL THERAPY | Facility: CLINIC | Age: 86
DRG: 065 | End: 2021-04-06
Payer: MEDICARE

## 2021-04-06 ENCOUNTER — APPOINTMENT (OUTPATIENT)
Dept: CARDIOLOGY | Facility: CLINIC | Age: 86
DRG: 065 | End: 2021-04-06
Attending: HOSPITALIST
Payer: MEDICARE

## 2021-04-06 ENCOUNTER — APPOINTMENT (OUTPATIENT)
Dept: SPEECH THERAPY | Facility: CLINIC | Age: 86
DRG: 065 | End: 2021-04-06
Payer: MEDICARE

## 2021-04-06 VITALS
HEART RATE: 73 BPM | DIASTOLIC BLOOD PRESSURE: 57 MMHG | SYSTOLIC BLOOD PRESSURE: 113 MMHG | RESPIRATION RATE: 20 BRPM | TEMPERATURE: 97.4 F | OXYGEN SATURATION: 96 %

## 2021-04-06 VITALS
HEART RATE: 86 BPM | BODY MASS INDEX: 26.49 KG/M2 | OXYGEN SATURATION: 95 % | DIASTOLIC BLOOD PRESSURE: 66 MMHG | TEMPERATURE: 98.1 F | SYSTOLIC BLOOD PRESSURE: 114 MMHG | WEIGHT: 151.9 LBS | RESPIRATION RATE: 16 BRPM

## 2021-04-06 PROCEDURE — 97535 SELF CARE MNGMENT TRAINING: CPT | Mod: GO | Performed by: OCCUPATIONAL THERAPIST

## 2021-04-06 PROCEDURE — 250N000013 HC RX MED GY IP 250 OP 250 PS 637: Performed by: INTERNAL MEDICINE

## 2021-04-06 PROCEDURE — 250N000011 HC RX IP 250 OP 636: Performed by: INTERNAL MEDICINE

## 2021-04-06 PROCEDURE — 97530 THERAPEUTIC ACTIVITIES: CPT | Mod: GP

## 2021-04-06 PROCEDURE — 99239 HOSP IP/OBS DSCHRG MGMT >30: CPT | Performed by: HOSPITALIST

## 2021-04-06 PROCEDURE — 97116 GAIT TRAINING THERAPY: CPT | Mod: GP

## 2021-04-06 PROCEDURE — 97530 THERAPEUTIC ACTIVITIES: CPT | Mod: GO | Performed by: OCCUPATIONAL THERAPIST

## 2021-04-06 PROCEDURE — 93272 ECG/REVIEW INTERPRET ONLY: CPT | Performed by: INTERNAL MEDICINE

## 2021-04-06 PROCEDURE — 92507 TX SP LANG VOICE COMM INDIV: CPT | Mod: GN | Performed by: SPEECH-LANGUAGE PATHOLOGIST

## 2021-04-06 PROCEDURE — 93270 REMOTE 30 DAY ECG REV/REPORT: CPT

## 2021-04-06 RX ORDER — CLOPIDOGREL BISULFATE 75 MG/1
75 TABLET ORAL DAILY
Qty: 19 TABLET | Refills: 0 | DISCHARGE
Start: 2021-04-07 | End: 2021-04-26

## 2021-04-06 RX ADMIN — CLOPIDOGREL BISULFATE 75 MG: 75 TABLET ORAL at 09:30

## 2021-04-06 RX ADMIN — AMLODIPINE BESYLATE 10 MG: 10 TABLET ORAL at 09:30

## 2021-04-06 RX ADMIN — ONDANSETRON 4 MG: 4 TABLET, ORALLY DISINTEGRATING ORAL at 13:23

## 2021-04-06 RX ADMIN — ASPIRIN 81 MG: 81 TABLET, DELAYED RELEASE ORAL at 09:30

## 2021-04-06 RX ADMIN — LOSARTAN POTASSIUM 100 MG: 100 TABLET, FILM COATED ORAL at 09:30

## 2021-04-06 ASSESSMENT — ACTIVITIES OF DAILY LIVING (ADL)
ADLS_ACUITY_SCORE: 16

## 2021-04-06 NOTE — PLAN OF CARE
Occupational Therapy Discharge Summary    Reason for therapy discharge:    Discharged to transitional care facility.    Progress towards therapy goal(s). See goals on Care Plan in [facility] electronic health record for goal details.  Goals partially met.  Barriers to achieving goals:   discharge from facility.    Therapy recommendation(s):    Continued therapy is recommended.  Rationale/Recommendations:  Recommend continued OT at TCU to maximize safety and independence with I/ADLs.

## 2021-04-06 NOTE — PROGRESS NOTES
Care Management Discharge Note    Discharge Date: 04/06/21  Expected Time of Departure: 14:00    Discharge Disposition: Transitional Care    Discharge Services: inpatient rehab at [facility]     Discharge DME:      Discharge Transportation: other (see comments)([place] transport) [facility] transport aide    Private pay costs discussed: Not applicable    PAS Confirmation Code: [id number]  Patient/family educated on Medicare website which has current facility and service quality ratings: other (see comments)    Education Provided on the Discharge Plan:  Yes. Pt and nephew updated of plan and are in agreement. Pt wanted to stay in [place] for rehab. Dianna Xiao will support pt in rehab with face time chats as they are aware that family is not able to visit for 2 weeks.  Persons Notified of Discharge Plans: Hospitalist, CTRN,RN,HUC,BB pt and nephew.  Patient/Family in Agreement with the Plan: yes    Handoff Referral Completed: No    Additional Information:    PADDY Banks  [facility]  [phone]

## 2021-04-06 NOTE — PLAN OF CARE
Speech Language Therapy Discharge Summary    Reason for therapy discharge:    Discharged to transitional care facility.    Progress towards therapy goal(s). See goals on Care Plan in [facility] electronic health record for goal details.  Goals not met.  Barriers to achieving goals:   discharge from facility.    Therapy recommendation(s):    Continued therapy is recommended.  Rationale/Recommendations:  Continue speech and language treatment for aphasia. No swallow needs.

## 2021-04-06 NOTE — PHARMACY-ADMISSION MEDICATION HISTORY
Pharmacy Medication History  Admission medication history interview status for the 4/2/2021  admission is complete. See EPIC admission navigator for prior to admission medications     Location of Interview: records  Medication history sources: last discharge, surescripts. Pt not appropriate for interview. lives alone.     Significant changes made to the medication list:  n/a    In the past week, patient estimated taking medication this percent of the time: unknown    Additional medication history information:   Matches surescripts and last discharge. Cannot interview pt.     Medication reconciliation completed by provider prior to medication history? Yes    Time spent in this activity: 25 minutes    Prior to Admission medications    Medication Sig Last Dose Taking? Auth Provider   amLODIPine (NORVASC) 10 MG tablet Take 1 tablet (10 mg) by mouth daily  Yes Sesar Cuevas MD   estradiol (ESTRACE VAGINAL) 0.1 MG/GM vaginal cream Place 1 g vaginally twice a week Place 1g in vagina twice weekly  Patient taking differently: Place 1 g vaginally as needed Place 1g in vagina twice weekly PRN  at PRN Yes Juan Pollard MD   losartan (COZAAR) 100 MG tablet TAKE 1 TABLET(100 MG) BY MOUTH DAILY  Yes Ken Norton MD   multivitamin, therapeutic (THERA-VIT) TABS tablet Take 1 tablet by mouth daily  Yes Unknown, Entered By History   torsemide (DEMADEX) 5 MG tablet Take 1 tablet (5 mg) by mouth daily Take in AM  Yes Sesar Cuevas MD       The information provided in this note is only as accurate as the sources available at the time of update(s)

## 2021-04-06 NOTE — PLAN OF CARE
Pt here with L PCA stroke. Disoriented to time and place. Neuros: confused and has word-finding difficulty. Hard of hearing; abl to hear more on right ear. VSS. Tele NSR with first degree AV block . Regular diet, thin liquids. Takes pills whole. Up with A1 with GB+W. Denies pain. Pt scoring yellow on the Aggression Stop Light Tool. Discharge instructions reviewed with family member.

## 2021-04-06 NOTE — PLAN OF CARE
Pt here with L PCA CVA. A&Ox2, forgetful but redirectable. Neuros slight WFD otherwise intact. [place]. VSS. Tele SR with 1st degree AVB. Regular diet, thin liquids. Takes pills whole with water. Up with A1/GB/W. Saline locked. Denies pain. Pt scoring yellow on the Aggression Stop Light Tool, for confusion. Slept well overnight. Discharge to TCU pending placement with event monitor.

## 2021-04-06 NOTE — DISCHARGE SUMMARY
[facility]  Hospitalist Discharge Summary      Date of Admission:  4/2/2021  Date of Discharge:  4/6/2021  Discharging Provider: Domenico Cm,       Discharge Diagnoses   Acute ischemic infarct, left PCA territory   Aphasia  Troponin elevation from demand ischemia    Follow-ups Needed After Discharge   Follow-up Appointments     Follow Up and recommended labs and tests      Follow up with Nursing home physician.  No follow up labs or test are   needed.    Follow-up with neurology in 6-8 weeks             Unresulted Labs Ordered in the Past 30 Days of this Admission     No orders found from 3/3/2021 to 4/3/2021.      These results will be followed up by none    Discharge Disposition   Discharged to rehabilitation facility  Condition at discharge: Stable      Hospital Course      Acute ischemic infarct, left PCA territory   Aphasia   Presents with difficulty with ambulation, difficulty speaking. Head CT with moderate sized evolving acute ischemic infarct in the left PCA territory. CTA head/neck with acute occlusion of the left P2 segment, otherwise no significant stenosis.    - TTE-LVEF 65-70%.   - A1c 5.6%, LDL 68  - s/p Plavix load. Continue ASA + Plavix x 21 days, then ASA 81 mg alone indefinately  - PT/OT/SLP-->rec ARU  - LDL 68. Discussed with neuro not recommending statin at this time  - 30 day Cardiac event monitor at discharge to eval for Afib  - did not tolerate MRI--discussed with neurology, will cancel MRI  - resume blood pressure medications as below  - neuro following, appreciate help  - f/u with neuro in 6-8 weeks, [facility] General Neurology clinic [phone]     Troponin elevation  Troponin 2.309. Patient denies chest pain, EKG without ischemic changes. Suspect secondary to CVA.  - troponin trended down to 1.697  - TTE- LVEF 65-70%, no WMA     Chronic kidney disease, stage 3  Baseline creatinine 1.1-1.2 with GFR in 40s. Creatinine 1.21 on admission.   - Currently around  baseline  - Avoid nephrotoxins  - Monitor BMP       Hypertension  - resume PTA Losartan 100mg daily  -Resume PTA amlodipine 10 mg daily  -resume torsemide     History of hyponatremia  Recent admission 3/26-3/31 for hyponatremia with suspicion for polydipsia, sodium 131 at discharge.   - Sodium 134-138  - Monitor     Depression  Escitalopram recently discontinued due to hyponatremia.           Consultations This Hospital Stay   PATIENT LEARNING CENTER IP CONSULT  SWALLOW EVAL SPEECH PATH AT BEDSIDE IP CONSULT  SPEECH LANGUAGE PATH ADULT IP CONSULT  PHARMACY IP CONSULT  PHARMACY IP CONSULT  PHARMACY IP CONSULT  PHYSICAL THERAPY ADULT IP CONSULT  OCCUPATIONAL THERAPY ADULT IP CONSULT  CARE MANAGEMENT / SOCIAL WORK IP CONSULT  NEUROLOGY IP CONSULT  PHYSICAL THERAPY ADULT IP CONSULT  OCCUPATIONAL THERAPY ADULT IP CONSULT  SPEECH LANGUAGE PATH ADULT IP CONSULT  SMOKING CESSATION PROGRAM IP CONSULT    Code Status   Full Code    Time Spent on this Encounter   I, Domenico Cm DO, personally saw the patient today and spent greater than 30 minutes discharging this patient.       Domenico Cm DO  [address] STROKE CENTER  [facility] [place] [address]  Phone: [phone]  ______________________________________________________________________    Physical Exam   Vital Signs: Temp: 98.1  F (36.7  C) Temp src: Axillary BP: 114/66 Pulse: 86   Resp: 16 SpO2: 95 % O2 Device: None (Room air)    Weight: 151 lbs 14.4 oz  General: alert, awake and no apparent distress, knows in hospital, could tell me which one, knows it's holiday today  Resp: clear to auscultation bilaterally, no wheezing  CVS: regular rate and rhythm  Abd: soft and non-tender  Ext:  No LE edema          Primary Care Physician   Sesar Cuevas    Discharge Orders      NEUROLOGY ADULT REFERRAL      CARE COORDINATION REFERRAL      General info for SNF    Length of Stay Estimate: Short Term Care: Estimated # of Days  31-90  Condition at Discharge: Stable  Level of care:skilled   Rehabilitation Potential: Fair  Admission H&P remains valid and up-to-date: Yes  Recent Chemotherapy: N/A  Use Nursing Home Standing Orders: N/A     Mantoux instructions    Give two-step Mantoux (PPD) Per Facility Policy Yes     Reason for your hospital stay    stroke     Follow Up and recommended labs and tests    Follow up with Nursing home physician.  No follow up labs or test are needed.    Follow-up with neurology in 6-8 weeks     Activity - Up with nursing assistance     Physical Therapy Adult Consult    Evaluate and treat as clinically indicated.    Reason:  stroke     Occupational Therapy Adult Consult    Evaluate and treat as clinically indicated.    Reason:  stroke     Speech Language Path Adult Consult    Evaluate and treat as clinically indicated.    Reason:  stroke     Cardiac Event Monitor Adult Pediatric     Advance Diet as Tolerated    Follow this diet upon discharge: Orders Placed This Encounter      Snacks/Supplements Adult: Ensure Enlive; Between Meals      Regular Diet Adult       Significant Results and Procedures   Most Recent 3 CBC's:  Recent Labs   Lab Test 04/04/21  0704 04/02/21  2218 03/28/21  0608   WBC 4.4 5.4 5.2   HGB 10.3* 10.3* 11.5*   MCV 84 84 79    159 138*     Most Recent 3 BMP's:  Recent Labs   Lab Test 04/04/21  0704 04/03/21  1357 04/02/21 2218    138 134   POTASSIUM 4.3 3.8 4.0   CHLORIDE 107 108 104   CO2 24 24 21   BUN 26 23 30   CR 1.19* 1.13* 1.21*   ANIONGAP 5 6 9   GALINA 9.9 10.1 9.8   GLC 98 181* 124*     Most Recent 2 LFT's:  Recent Labs   Lab Test 04/02/21  2218 10/13/17  1720   AST 37 18   ALT 39 24   ALKPHOS 65 80   BILITOTAL 0.5 0.3     Most Recent 3 INR's:  Recent Labs   Lab Test 04/02/21  2336   INR 1.03   ,   Results for orders placed or performed during the hospital encounter of 04/02/21   CT Head w/o Contrast    Narrative    EXAM: CT HEAD W/O CONTRAST, CTA  HEAD NECK WITH  CONTRAST  LOCATION: [facility]  DATE/TIME: 4/2/2021 11:49 PM    INDICATION: Confusion. Stroke code.  COMPARISON: 02/04/2021  CONTRAST: 70mL Isovue-370  TECHNIQUE: Head and neck CT angiogram with IV contrast. Noncontrast head CT followed by axial helical CT images of the head and neck vessels obtained during the arterial phase of intravenous contrast administration. Axial 2D reconstructed images and   multiplanar 3D MIP reconstructed images of the head and neck vessels were performed by the technologist. Dose reduction techniques were used.     FINDINGS:   NONCONTRAST HEAD CT:   INTRACRANIAL CONTENTS: Loss of gray-white differentiation in the inferomedial left temporal lobe involving the mesial temporal structures and inferior left occipital lobe compatible with an evolving ischemic infarct. No hemorrhage. Moderate presumed   chronic small vessel ischemic changes. Chronic infarcts in the right parietal/occipital junction and left parietal lobe. Chronic lacunar infarcts bilateral cerebellar hemispheres, right basal ganglia and left thalamus. Mild to moderate generalized volume   loss. No hydrocephalus.     VISUALIZED ORBITS/SINUSES/MASTOIDS: No intraorbital abnormality. Mild mucosal thickening scattered about the paranasal sinuses. No middle ear or mastoid effusion.    BONES/SOFT TISSUES: No acute abnormality.    HEAD CTA:  ANTERIOR CIRCULATION: No stenosis/occlusion, aneurysm, or high flow vascular malformation. Standard [place] anatomy.    POSTERIOR CIRCULATION: Acute occlusion of the left P2 segment. No aneurysm or AVM. Balanced vertebral arteries supply a normal basilar artery.     DURAL VENOUS SINUSES: Expected enhancement of the major dural venous sinuses.    NECK CTA:  RIGHT CAROTID: No measurable stenosis or dissection. Luminal regularity of the ICA.    LEFT CAROTID: No measurable stenosis or dissection. Luminal regularity of the ICA.    VERTEBRAL ARTERIES: No focal stenosis or  dissection. Balanced vertebral arteries.    AORTIC ARCH: Classic aortic arch anatomy with no significant stenosis at the origin of the great vessels.    NONVASCULAR STRUCTURES: Unremarkable.      Impression    IMPRESSION:   HEAD CT:  1.  Moderate-sized evolving acute ischemic infarct in the left PCA territory involving the mesial temporal structures and inferomedial left temporal and occipital junction. No hemorrhage.  2.  Chronic infarcts at the right parietal/occipital junction, left parietal cortex. Chronic lacunar infarcts in the left thalamus, bilateral basal ganglia and bilateral cerebellar hemispheres.    HEAD CTA:   1.  Acute occlusion of the left P2 segment.  2.  Otherwise unremarkable.    NECK CTA:  1.  No significant stenosis in the neck vessels based on NASCET criteria.  2.  No evidence for dissection.  3.  Luminal irregularity of the right greater than left internal carotid arteries consistent with fibromuscular dysplasia.    Noncontrast head CT and CTA head and neck were discussed with Dr. Louis at 23:57 hours.   CTA Head Neck with Contrast    Narrative    EXAM: CT HEAD W/O CONTRAST, CTA  HEAD NECK WITH CONTRAST  LOCATION: [facility]  DATE/TIME: 4/2/2021 11:49 PM    INDICATION: Confusion. Stroke code.  COMPARISON: 02/04/2021  CONTRAST: 70mL Isovue-370  TECHNIQUE: Head and neck CT angiogram with IV contrast. Noncontrast head CT followed by axial helical CT images of the head and neck vessels obtained during the arterial phase of intravenous contrast administration. Axial 2D reconstructed images and   multiplanar 3D MIP reconstructed images of the head and neck vessels were performed by the technologist. Dose reduction techniques were used.     FINDINGS:   NONCONTRAST HEAD CT:   INTRACRANIAL CONTENTS: Loss of gray-white differentiation in the inferomedial left temporal lobe involving the mesial temporal structures and inferior left occipital lobe compatible with an evolving ischemic  infarct. No hemorrhage. Moderate presumed   chronic small vessel ischemic changes. Chronic infarcts in the right parietal/occipital junction and left parietal lobe. Chronic lacunar infarcts bilateral cerebellar hemispheres, right basal ganglia and left thalamus. Mild to moderate generalized volume   loss. No hydrocephalus.     VISUALIZED ORBITS/SINUSES/MASTOIDS: No intraorbital abnormality. Mild mucosal thickening scattered about the paranasal sinuses. No middle ear or mastoid effusion.    BONES/SOFT TISSUES: No acute abnormality.    HEAD CTA:  ANTERIOR CIRCULATION: No stenosis/occlusion, aneurysm, or high flow vascular malformation. Standard [place] anatomy.    POSTERIOR CIRCULATION: Acute occlusion of the left P2 segment. No aneurysm or AVM. Balanced vertebral arteries supply a normal basilar artery.     DURAL VENOUS SINUSES: Expected enhancement of the major dural venous sinuses.    NECK CTA:  RIGHT CAROTID: No measurable stenosis or dissection. Luminal regularity of the ICA.    LEFT CAROTID: No measurable stenosis or dissection. Luminal regularity of the ICA.    VERTEBRAL ARTERIES: No focal stenosis or dissection. Balanced vertebral arteries.    AORTIC ARCH: Classic aortic arch anatomy with no significant stenosis at the origin of the great vessels.    NONVASCULAR STRUCTURES: Unremarkable.      Impression    IMPRESSION:   HEAD CT:  1.  Moderate-sized evolving acute ischemic infarct in the left PCA territory involving the mesial temporal structures and inferomedial left temporal and occipital junction. No hemorrhage.  2.  Chronic infarcts at the right parietal/occipital junction, left parietal cortex. Chronic lacunar infarcts in the left thalamus, bilateral basal ganglia and bilateral cerebellar hemispheres.    HEAD CTA:   1.  Acute occlusion of the left P2 segment.  2.  Otherwise unremarkable.    NECK CTA:  1.  No significant stenosis in the neck vessels based on NASCET criteria.  2.  No evidence for  dissection.  3.  Luminal irregularity of the right greater than left internal carotid arteries consistent with fibromuscular dysplasia.    Noncontrast head CT and CTA head and neck were discussed with Dr. Louis at 23:57 hours.   Echocardiogram Complete - For age > 60 yrs    Narrative    [id number]  [id number]  [id number]  [id number]^CHRISTINA^BHARATH^MARIELLA     [facility]  Echocardiography Laboratory  [facility] [place] [address]     Name: PAULA CODY  MRN: [id number]  : 09/10/1926  Study Date: 2021 10:43 AM  Age: 89 yrs  Gender: Female  Patient Location: [facility]  Reason For Study: Cerebrovascular Incident  Ordering Physician: BHARATH VASQUEZ  Referring Physician: Sesar Cuevas  Performed By: Ana Maria Smith     BSA: 1.7 m2  Height: 63 in  Weight: 151 lb  HR: 74  BP: 168/67 mmHg  ______________________________________________________________________________  Procedure  Complete Portable Echo Adult. Optison (NDC #[id number]) given intravenously.  ______________________________________________________________________________  Interpretation Summary     The left ventricle is normal in size.  The visual ejection fraction is estimated at 65-70%.  Diastolic Doppler findings (E/E' ratio and/or other parameters) suggest left  ventricular filling pressures are indeterminate.  No regional wall motion abnormalities noted.  No significant valvular heart disease.  ______________________________________________________________________________  Left Ventricle  The left ventricle is normal in size. There is normal left ventricular wall  thickness. The visual ejection fraction is estimated at 65-70%. Diastolic  Doppler findings (E/E' ratio and/or other parameters) suggest left ventricular  filling pressures are indeterminate. No regional wall motion abnormalities  noted.     Right Ventricle  The right ventricle is normal in size and function.     Atria  The left atrium is borderline dilated. Right atrial  size is normal. There is  no color Doppler evidence of an atrial shunt.     Mitral Valve  The mitral valve leaflets are mildly thickened. There is mild (1+) mitral  regurgitation.     Tricuspid Valve  There is mild (1+) tricuspid regurgitation. The right ventricular systolic  pressure is approximated at 24.5 mmHg plus the right atrial pressure. IVC  diameter and respiratory changes fall into an intermediate range suggesting an  RA pressure of 8 mmHg.     Aortic Valve  There is trivial trileaflet aortic sclerosis. There is trace aortic  regurgitation.     Pulmonic Valve  There is trace pulmonic valvular regurgitation.     Vessels  Normal size aorta. The aortic root is normal size.     Pericardium  There is no pericardial effusion.     Rhythm  Sinus rhythm was noted.  ______________________________________________________________________________  MMode/2D Measurements & Calculations  IVSd: 1.1 cm     LVIDd: 4.1 cm  LVIDs: 2.5 cm  LVPWd: 1.0 cm  FS: 39.3 %  LV mass(C)d: 142.8 grams  LV mass(C)dI: 83.2 grams/m2  Ao root diam: 2.9 cm  LA dimension: 3.6 cm  asc Aorta Diam: 3.4 cm  LA/Ao: 1.2  LA Volume (BP): 53.6 ml  LA Volume Index (BP): 31.2 ml/m2  RWT: 0.51     Doppler Measurements & Calculations  MV E max marisela: 61.6 cm/sec  MV A max marisela: 123.0 cm/sec  MV E/A: 0.50  MV dec slope: 370.0 cm/sec2  AI P1/2t: 457.9 msec  PA acc time: 0.11 sec  TR max marisela: 247.3 cm/sec  TR max P.5 mmHg  E/E' av.7  Lateral E/e': 10.9  Medial E/e': 8.4     ______________________________________________________________________________  Report approved by: Bernice Hernández 2021 12:43 PM               Discharge Medications   Current Discharge Medication List      START taking these medications    Details   aspirin (ASA) 81 MG EC tablet Take 1 tablet (81 mg) by mouth daily  Qty:      Associated Diagnoses: Cerebrovascular accident (CVA), unspecified mechanism (H)      clopidogrel (PLAVIX) 75 MG tablet 1 tablet (75 mg) by Oral or NG  Tube route daily for 19 days  Qty: 19 tablet, Refills: 0    Associated Diagnoses: Cerebrovascular accident (CVA), unspecified mechanism (H)         CONTINUE these medications which have NOT CHANGED    Details   amLODIPine (NORVASC) 10 MG tablet Take 1 tablet (10 mg) by mouth daily  Qty: 90 tablet, Refills: 3    Associated Diagnoses: Essential hypertension, benign      estradiol (ESTRACE VAGINAL) 0.1 MG/GM vaginal cream Place 1 g vaginally twice a week Place 1g in vagina twice weekly  Qty: 42.5 g, Refills: 3    Associated Diagnoses: Urinary urgency      losartan (COZAAR) 100 MG tablet TAKE 1 TABLET(100 MG) BY MOUTH DAILY  Qty: 90 tablet, Refills: 3    Comments: **Patient requests 90 days supply**  Associated Diagnoses: Essential hypertension, benign      multivitamin, therapeutic (THERA-VIT) TABS tablet Take 1 tablet by mouth daily      torsemide (DEMADEX) 5 MG tablet Take 1 tablet (5 mg) by mouth daily Take in AM  Qty: 90 tablet, Refills: 3    Associated Diagnoses: Essential hypertension, benign           Allergies   Allergies   Allergen Reactions     Indomethacin      Cognitive impairment     Keflex [Cephalexin]      HIVES     Sulfamethoxazole-Trimethoprim      BACTRIM

## 2021-04-06 NOTE — DISCHARGE INSTRUCTIONS
Your risk factors for stroke or TIA (transient ischemic attack):    Your Risk Factors Your Results Normal Ranges   High blood pressure BP Readings from Last 1 Encounters:   04/06/21 114/66    Less than 120/80   Cholesterol              Total Lab Results   Component Value Date    CHOL 146 04/03/2021      Less than 150    Triglycerides   Lab Results   Component Value Date    TRIG 52 04/03/2021    Less than 150   LDL Lab Results   Component Value Date    LDL 68 04/03/2021       Less than 70   HDL Lab Results   Component Value Date    HDL 68 04/03/2021            Greater than 40 (men)  Greater than 50 (women)   Diabetes Recent Labs   Lab 04/04/21  0704   GLC 98    Fasting blood glucose    Smoking/tobacco use  Quit smoking and tobacco   Overweight  Lose 1-2 pounds a week   Lack of exercise  30 minutes moderate activity each day   Other risk factors include carotid (neck) artery disease, atrial fibrillation and stress. You may be on new medicine to treat high blood pressure, cholesterol, diabetes or atrial fibrillation.    Understanding Stroke Booklet given to patient. Please refer to booklet for further information.    Stroke warning signs and symptoms - CALL 911 right away for:  - Sudden numbness or weakness in the face, arm or leg (often on one side of the body).  - Sudden confusion or trouble understanding what is going on.  - Sudden blurred or decreased vision in one or both eyes.  - Sudden trouble speaking, loss of balance, dizziness or problems with coordination.  - Sudden, severe headache for no reason.  - Fainting or seizures.  - Symptoms may go away then come back suddenly.

## 2021-04-07 ENCOUNTER — NURSING HOME VISIT (OUTPATIENT)
Dept: GERIATRICS | Facility: CLINIC | Age: 86
End: 2021-04-07
Payer: MEDICARE

## 2021-04-07 ENCOUNTER — PATIENT OUTREACH (OUTPATIENT)
Dept: CARE COORDINATION | Facility: CLINIC | Age: 86
End: 2021-04-07

## 2021-04-07 DIAGNOSIS — E87.1 HYPONATREMIA: ICD-10-CM

## 2021-04-07 DIAGNOSIS — R47.01 APHASIA: ICD-10-CM

## 2021-04-07 DIAGNOSIS — F32.A DEPRESSION, UNSPECIFIED DEPRESSION TYPE: ICD-10-CM

## 2021-04-07 DIAGNOSIS — N18.32 STAGE 3B CHRONIC KIDNEY DISEASE (H): ICD-10-CM

## 2021-04-07 DIAGNOSIS — I63.89: Primary | ICD-10-CM

## 2021-04-07 DIAGNOSIS — I10 ESSENTIAL HYPERTENSION: ICD-10-CM

## 2021-04-07 DIAGNOSIS — R79.89 TROPONIN LEVEL ELEVATED: ICD-10-CM

## 2021-04-07 DIAGNOSIS — Z71.89 ADVANCED DIRECTIVES, COUNSELING/DISCUSSION: ICD-10-CM

## 2021-04-07 DIAGNOSIS — R53.81 PHYSICAL DECONDITIONING: ICD-10-CM

## 2021-04-07 PROCEDURE — 99310 SBSQ NF CARE HIGH MDM 45: CPT | Performed by: NURSE PRACTITIONER

## 2021-04-07 ASSESSMENT — ACTIVITIES OF DAILY LIVING (ADL): DEPENDENT_IADLS:: INDEPENDENT

## 2021-04-07 NOTE — PROGRESS NOTES
[facility]  PRIMARY CARE PROVIDER AND CLINIC:  Sesar Cuevas MD, [address] / [place] [address]  Chief Complaint   Patient presents with     Hospital F/U     [facility] Record Number:  [id number]  Place of Service where encounter took place:  [facility] [[id number]]    Tanisha Muhammad  is a 89 year old  (9/10/1926), admitted to the above facility from  [facility]. Hospital stay 4/2/21 through 4/6/21.  Admitted to this facility for  rehab, medical management and nursing care.    HPI:    HPI information obtained from: facility chart records, facility staff, patient report and [facility] chart review.   Brief Summary of Hospital Course:   Acute ischemic infarct, left PCA territory   Aphasia   89 yo female hospitalized d/t difficulty with ambulation, speech. Head CT with moderate sized evolving acute ischemic infarct in the left PCA territory. CTA head/neck with acute occlusion of the left P2 segment, otherwise no significant stenosis. Now on Plavix and ASA, then asa alone. Statin not recommended, needs 30 day cardiac monitor for eval for afib. Neuro f/u 6=8 weeks. Elevated troponin ?d/t CVA trended down no symptoms. CKD with stable Cr 1.1-1.2. HTN managed with losartan and Norvasc. Recent Na 134-138, hx Na 131 in March. Depression hx but escitalopram stopped due to Low Na. To TCU for therapies.     Updates on Status Since Skilled nursing Admission:  Patient seen for initial TCU visit. She is quite confused and forgetful and is an unreliable historian. She reports no headaches, dizziness, chest pain or dyspnea. Unsure last stool but no GI symptoms. She continuously reverts to asking why she is here and what has happened.     Per EMR note BP range 118-163/66-80 and sats 95% on room air. Spoke to nephew via phone: discussed POLST and Full Code is desired. No other concerns at this time.     CODE STATUS/ADVANCE DIRECTIVES DISCUSSION:    CPR/Full code   Patient's living condition: lives alone  ALLERGIES: Indomethacin, Keflex [cephalexin], and Sulfamethoxazole-trimethoprim  PAST MEDICAL HISTORY:  has a past medical history of Cystocele (03/2010), Elevated glucose, Gout (03/2010), [place]'s disease (2010), Inactive Ménière's disease, Post-menopausal bleeding (01/02/2014), Rectocele, Trigeminal neuralgia (2015), Unspecified essential hypertension, and Unspecified venous (peripheral) insufficiency.  PAST SURGICAL HISTORY:   has a past surgical history that includes DENTAL CONSULTATION; EYE EXAM & TREATMENT; cataract iol, rt/lt; and Laser YAG capsulotomy (Left, 10/2/2015).  FAMILY HISTORY: family history includes Arthritis in her sister; Cancer in her brother and mother; Heart Disease in her brother, brother, and father; Hypertension in her mother; Respiratory in her father.  SOCIAL HISTORY:   reports that she has never smoked. She has never used smokeless tobacco. She reports current alcohol use. She reports that she does not use drugs.    Post Discharge Medication Reconciliation Status: discharge medications reconciled, continue medications without change  Current Outpatient Medications   Medication Sig Dispense Refill     amLODIPine (NORVASC) 10 MG tablet Take 1 tablet (10 mg) by mouth daily 90 tablet 3     aspirin (ASA) 81 MG EC tablet Take 1 tablet (81 mg) by mouth daily       clopidogrel (PLAVIX) 75 MG tablet 1 tablet (75 mg) by Oral or NG Tube route daily for 19 days 19 tablet 0     estradiol (ESTRACE VAGINAL) 0.1 MG/GM vaginal cream Place 1 g vaginally twice a week Place 1g in vagina twice weekly (Patient taking differently: Place 1 g vaginally as needed Place 1g in vagina twice weekly PRN) 42.5 g 3     losartan (COZAAR) 100 MG tablet TAKE 1 TABLET(100 MG) BY MOUTH DAILY 90 tablet 3     multivitamin, therapeutic (THERA-VIT) TABS tablet Take 1 tablet by mouth daily       torsemide (DEMADEX) 5 MG tablet Take 1 tablet (5 mg) by mouth daily Take in AM  90 tablet 3       ROS:  Limited secondary to cognitive impairment but today patient reports no pain, dyspnea, bowel issues    Vitals:  /57   Pulse 73   Temp 97.4  F (36.3  C)   Resp 20   SpO2 96%   Exam:  GENERAL APPEARANCE:  Alert, in no distress, pleasant, cooperative, oriented x self  EYES:  EOM, lids, pupils and irises normal, sclera clear and conjunctiva normal, no discharge or mattering on lids or lashes noted  ENT:  Mouth normal, moist mucous membranes, nose normal without drainage or crusting, external ears without lesions, hearing acuity [place]  NECK: supple, symmetrical, trachea midline  RESP:  respiratory effort normal, no chest wall tenderness, no respiratory distress, Lung sounds clear, patient is on RA  CV:  Auscultation of heart done, rate and rhythm controlled and regular, no murmur, no rub or gallop. Edema none bilateral lower extremities.   ABDOMEN:  normal bowel sounds, soft, nontender, no palpable masses.  M/S:   Gait and station walks with assist , no tenderness or swelling of the joints; able to move all extremities, digits normal  SKIN:  Inspection and palpation of skin and subcutaneous tissue: skin on extremities warm, dry and intact without rashes  NEURO: no facial asymmetry, no speech deficits and able to follow directions, moves all extremities  PSYCH:  insight and judgement and memory impaired, affect and mood anxious    Lab/Diagnostic data:  Most Recent 3 CBC's:  Recent Labs   Lab Test 04/04/21  0704 04/02/21  2218 03/28/21  0608   WBC 4.4 5.4 5.2   HGB 10.3* 10.3* 11.5*   MCV 84 84 79    159 138*     Most Recent 3 BMP's:  Recent Labs   Lab Test 04/04/21  0704 04/03/21  1357 04/02/21  2218    138 134   POTASSIUM 4.3 3.8 4.0   CHLORIDE 107 108 104   CO2 24 24 21   BUN 26 23 30   CR 1.19* 1.13* 1.21*   ANIONGAP 5 6 9   GALINA 9.9 10.1 9.8   GLC 98 181* 124*     Most Recent TSH and T4:  Recent Labs   Lab Test 03/27/21  0651   TSH 1.73     Most Recent Hemoglobin A1c:  Recent  Labs   Lab Test 04/02/21  2218   A1C 5.6       ASSESSMENT/PLAN:  Acute cerebral infarction associated with systemic hypoxia or ischemia (H)  Aphasia  Physical deconditioning  Acute onset, now at TCU for therapies. Monitor for any further neuro changes. Continue ASA 81 mg daily and Plavix 75 mg daily and f/u with neurology as recommended and consider 30 day event monitor to check for a fib.     Troponin level elevated  Resolved - no symptoms. Monitor.     Essential hypertension  Chronic, well managed continue Norvasc 10 mg daily as well as cozaar 100 mg daily and Demadex 5 mg daily. Monitor vs, wt and f/u with ranges next visit.     Stage 3b chronic kidney disease  Hyponatremia  Acute on chronic, stable renal function last check and hyponatremia resolved. Avoid nephrotoxins. F/U with BMP and CBC next week.     Depression, unspecified depression type  By history, no longer on meds and does not appear symptomatic. Monitor mood.     Advanced directives, counseling/discussion  Reviewed and completed POLST with nephew - Full Code desired at this time.      Orders written by provider at facility  1. CBC, BMP 4/13 due to weakness  2. Full Code    Total unit/floor time of >36 minutes consisted of the following: Examination of patient, reviewing the record including pertinent labs and imaging, placing orders, and completing documentation.  More than 50% of this time (>19 minutes) (>50%) was spent in coordination of care with the patient and her nephew via phone, nursing staff and other healthcare providers.   This time was spent on discussing the care plan including hospital course, hospital discharge recommendations, recent TCU course and concerns, medications, cognitive impairment, discharge/safe placement, specialty follow up need.  Time was also spent on discussing POLST and code status.       Time with patient and nephew included: Discussion of diagnostic and imaging test results, diagnosis and prognosis, overall goal  and disposition plan.      -Medical decision making and discussions included:  Ongoing confusion, needs to monitor for safety and f/u once cognitive testing done  PT/OT restrictions to include up with assist  HTN management plan to include vs checks, meds reviewed  CKD management plan to include avoid nephrotoxins and BMP, CBC check next week    -Rx management plan discussion included:  Follow up needed with neurology   Disposition and discharge plan to include likely need for home care after discharge  Medication changes to include none today  Patient's nephew education concerning POLST and completion of form    -Time on communication of care included:  Updated RN, RN manager, [facility] on above orders and POC    Electronically signed by:  CRYSTAL Rosales CNP

## 2021-04-07 NOTE — PLAN OF CARE
Speech Language Therapy Discharge Summary    Reason for therapy discharge:    Discharged to transitional care facility.    Progress towards therapy goal(s). See goals on Care Plan in Epic electronic health record for goal details.  Goals not met.  Barriers to achieving goals:   discharge from facility.    Therapy recommendation(s):    Continued therapy is recommended.  Rationale/Recommendations:  Continue treatment for aphasia to improve functional communication.

## 2021-04-07 NOTE — PROGRESS NOTES
Clinic Care Coordination Contact  Care Coordination Transition Communication    Referral Source: IP Handoff    Clinical Data: Patient was hospitalized at [facility] from 4/2/21 to 4/6/21 with diagnosis of CVA.     Transition to Facility:              Facility Name: [facility]              Contact name and phone number/fax: [phone]    Fax sent to facility containing CC RN contact information and request for notification when patient discharging.    Plan: RN/SW Care Coordinator will await notification from facility staff informing RN/SW Care Coordinator of patient's discharge plans/needs. RN/SW Care Coordinator will review chart and outreach to facility staff every 4 weeks and as needed.     Mick Bhagat RN  Clinic Care Coordinator  [facility]  [place]  Ph: [phone]

## 2021-04-07 NOTE — LETTER
[facility]   To:   [facility]          Please give to facility    From:   Mick Bhagat RN Care Coordinator [facility]     Patient Name:  Tanisha Muhammad  YOB: 1926   Admit date: 4/6/21      *Information Needed:  Please contact me when the patient will discharge (or if they will move to long term care)- include the discharge date, disposition, and main diagnosis   - If the patient is discharged with home care services, please provide the name of the agency    Also- Please inform me if a care conference is being held.   Phone, Fax or Email with information       Thank You,   Mick Bhagat, RN  Clinic Care Coordinator  [facility]  [place], [place] [facility]  Ph: [phone]    [email or web address]

## 2021-04-08 NOTE — PROGRESS NOTES
[place] [facility]  INITIAL VISIT NOTE  April 9, 2021    PRIMARY CARE PROVIDER AND CLINIC:  Sesar Cuevas [address] / [place] [address]    CHIEF COMPLAINT:  Hospital follow-up/Initial visit    HPI:    Tanisha Muhammad is a 89 year old  (9/10/1926) female who [facility] for an initial visit.     Medical history is notable for hypertension, hyponatremia, depression, gout, osteoarthritis, chronic anemia, and CKD stage III.      Summary of hospital course:  Patient was hospitalized at [facility] from April 2 through April 6, 2021 after presenting with confusion and fatigue. CMP was significant for creatinine of 1.21.  Troponin I was 2.309.  Hemoglobin A1c was 5.6%.  CBC showed normal white count of 5.4 and hemoglobin of 10.3. Test for COVID-19 was negative.  EKG was significant for normal sinus rhythm and poor R wave progression in anterior leads.  CT head demonstrated moderate sized evolving acute ischemic infarct in the left PCA territory involving the mesial temporal structures and inferomedial left temporal and occipital junction, chronic infarcts in the right parietal/occipital junction, left parietal cortex, and  chronic lacunar infarcts in the left thalamus, bilateral basal ganglia, and bilateral cerebellar hemispheres..  CTA head and neck showed acute occlusion of the left P2 segment as well as luminal irregularity of the right greater than left internal carotid arteries consistent with fibromuscular dysplasia.  Echocardiogram revealed normal LV systolic function with EF of 65-70%, no regional wall motion abnormalities, and 1+ mitral and tricuspid regurgitation.  There was no color Doppler evidence of an atrial shunt.  Lipid panel showed total cholesterol of 146, HDL of 68, LDL of 68, and triglyceride of 52.  She was seen by neurology and loaded with Plavix, then started on aspirin and Plavix.  Patient could not tolerate MRI.  Plan  is to continue aspirin and Plavix for 21 days, then only aspirin at 81 mg daily indefinitely.  Neurology did not recommend initiation of a statin therapy.  Elevated troponin I felt to be secondary to demand ischemia in the setting of acute stroke.  TCU was recommended by therapies.  Neurology did recommend 30-day cardiac event monitor to evaluate for atrial fibrillation.    Patient is admitted to this facility for medical management, nursing care, and rehab.     Of note, history was obtained from patient, facility RN, and extensive review of the chart necessitated by complex hospitalization.    Today's visit:  Patient was seen in her room while sitting in a chair.  She appears frail but in no acute distress.  She clearly has memory deficits.  I spoke to RN and there is no fever, chills, chest pain, palpitation, dyspnea, nausea, vomiting, abdominal pain, or urinary symptoms.  She cannot recall when her last bowel movement was.    CODE STATUS:   CPR/Full code     PAST MEDICAL HISTORY:   Acute left PCA ischemia stroke in April 2021, likely embolic  Chronic strokes at the right parietal/occipital junction, left parietal cortex, and chronic lacunar infarcts in the left thalamus, bilateral basal ganglia, and bilateral cerebral hemispheres, noted on CT head on April 2, 2021  Bilateral internal carotid artery fibromuscular dysplasia, noted on CT angio on April 2, 2021  CKD stage IIIa, baseline creatinine 1.1-1.2  Hypertension  Chronic anemia, baseline hemoglobin 10-11  Hyponatremia  Depression   Gout  UTI  Venous insufficiency  Tye's disease  Ménière's disease  Trigeminal neuralgia  Vitamin D deficiency  Osteoarthritis  Right lower extremity cellulitis    Past Medical History:   Diagnosis Date     Cystocele 03/2010    midline     Elevated glucose      Gout 03/2010     Tye's disease 2010     Inactive Ménière's disease     Left ear deafness     Post-menopausal bleeding 01/02/2014    possibly due to pessary, area of  irritated skin visualized on exam with active light bleeding posterior to cervix. Will need bx if continues following estrogen cream and leaving pessary out for 3-4 weeks.      Rectocele      Trigeminal neuralgia 2015     Unspecified essential hypertension      Unspecified venous (peripheral) insufficiency        PAST SURGICAL HISTORY:   Past Surgical History:   Procedure Laterality Date     C DENTAL CONSULTATION      Dr Kahlil CEJA EYE EXAM, EST PATIENT,COMPREHESV      DR white     CATARACT IOL, RT/LT      Left     LASER YAG CAPSULOTOMY Left 10/2/2015    Procedure: LASER YAG CAPSULOTOMY;  Surgeon: Pieter Rios MD;  Location: [facility]       FAMILY HISTORY:   Family History   Problem Relation Age of Onset     Cancer Mother         kidney     Hypertension Mother      Heart Disease Father      Respiratory Father         emphysema     Heart Disease Brother         MI     Arthritis Sister         fibromyalgia     Heart Disease Brother         stent 4     Cancer Brother         renal        SOCIAL HISTORY:  Patient is  and lives in an apartment.  She reportedly uses a walker for ambulation.    Social History     Tobacco Use     Smoking status: Never Smoker     Smokeless tobacco: Never Used   Substance Use Topics     Alcohol use: Yes     Alcohol/week: 0.0 standard drinks     Comment: one glass wine a month       MEDICATIONS:  Current Outpatient Medications   Medication Sig Dispense Refill     amLODIPine (NORVASC) 10 MG tablet Take 1 tablet (10 mg) by mouth daily 90 tablet 3     aspirin (ASA) 81 MG EC tablet Take 1 tablet (81 mg) by mouth daily       clopidogrel (PLAVIX) 75 MG tablet 1 tablet (75 mg) by Oral or NG Tube route daily for 19 days 19 tablet 0     estradiol (ESTRACE VAGINAL) 0.1 MG/GM vaginal cream Place 1 g vaginally twice a week Place 1g in vagina twice weekly (Patient taking differently: Place 1 g vaginally as needed Place 1g in vagina twice weekly PRN) 42.5 g 3     losartan (COZAAR) 100 MG tablet  TAKE 1 TABLET(100 MG) BY MOUTH DAILY 90 tablet 3     multivitamin, therapeutic (THERA-VIT) TABS tablet Take 1 tablet by mouth daily       torsemide (DEMADEX) 5 MG tablet Take 1 tablet (5 mg) by mouth daily Take in AM 90 tablet 3       ALLERGIES:  Allergies   Allergen Reactions     Indomethacin      Cognitive impairment     Keflex [Cephalexin]      HIVES     Sulfamethoxazole-Trimethoprim      BACTRIM        ROS:  10 point ROS was attempted but was limited, given patient's underlying cognitive impairment. It was reviewed as much as possible as outlined in HPI.    PHYSICAL EXAM:  Vital signs were reviewed in the chart.  Vital Signs: Blood pressure 142/70, heart rate 78, respiratory rate 18, temperature 98.1  F, oxygen saturation 98%, weight 153.8 LBS  General: Frail appearing but in no acute distress  HEENT: Normocephalic; oropharynx clear, conjunctival pallor  Cardiovascular: Normal S1, S2, RRR  Respiratory: Lungs clear to auscultation bilaterally  GI: Abdomen soft, non-tender, non-distended, +BS  Extremities: Trace bilateral LE edema  Neuro: CX II-XII grossly intact; ROM in all four extremities grossly intact  Psych: Alert and oriented x1; normal affect  Skin: No acute rash    LABORATORY/IMAGING DATA:    Most Recent 3 CBC's:  Recent Labs   Lab Test 04/04/21  0704 04/02/21 2218 03/28/21  0608   WBC 4.4 5.4 5.2   HGB 10.3* 10.3* 11.5*   MCV 84 84 79    159 138*     Most Recent 3 BMP's:  Recent Labs   Lab Test 04/04/21  0704 04/03/21  1357 04/02/21 2218    138 134   POTASSIUM 4.3 3.8 4.0   CHLORIDE 107 108 104   CO2 24 24 21   BUN 26 23 30   CR 1.19* 1.13* 1.21*   ANIONGAP 5 6 9   GALINA 9.9 10.1 9.8   GLC 98 181* 124*     Most Recent 2 LFT's:  Recent Labs   Lab Test 04/02/21  2218 10/13/17  1720   AST 37 18   ALT 39 24   ALKPHOS 65 80   BILITOTAL 0.5 0.3     Most Recent 3 Troponin's:  Recent Labs   Lab Test 04/03/21  1357 04/02/21 2218 03/26/21  2035   TROPI 1.697* 2.309* <0.015     Most Recent 3 BNP's:No  lab results found.  Most Recent Cholesterol Panel:  Recent Labs   Lab Test 04/03/21  1357   CHOL 146   LDL 68   HDL 68   TRIG 52     Most Recent 6 Bacteria Isolates From Any Culture (See EPIC Reports for Culture Details):  Recent Labs   Lab Test 01/21/21  1342 04/05/19  1045 08/21/18  1535 09/06/17  1019 09/01/17  0851   CULT >100,000 colonies/mL  Escherichia coli  * >100,000 colonies/mL  Escherichia coli  * >100,000 colonies/mL  Escherichia coli  * No growth 10,000 to 50,000 colonies/mL  mixed urogenital yoko       Most Recent TSH and T4:  Recent Labs   Lab Test 03/27/21  0651   TSH 1.73     Most Recent Hemoglobin A1c:  Recent Labs   Lab Test 04/02/21  2218   A1C 5.6     Most Recent 6 glucoses:  Recent Labs   Lab Test 04/04/21  0704 04/03/21  1357 04/02/21  2218 03/31/21  0533 03/30/21  0600 03/29/21  0606   GLC 98 181* 124* 101* 91 106*     Most Recent ESR & CRP:  Recent Labs   Lab Test 04/26/17  1044   SED 25   CRP 31.0*     Most Recent Anemia Panel:  Recent Labs   Lab Test 04/04/21  0704 10/13/17  1720 10/13/17  1720   WBC 4.4   < > 4.5   HGB 10.3*   < > 12.4   HCT 29.7*   < > 35.6   MCV 84   < > 87      < > 114*   B12  --   --  980    < > = values in this interval not displayed.       ASSESSMENT/PLAN:  Acute left PCA ischemia stroke in April 2021, likely embolic,  Chronic strokes at the right parietal/occipital junction, left parietal cortex, and chronic lacunar infarcts in the left thalamus, bilateral basal ganglia, and bilateral cerebral hemispheres, noted on CT head on April 2, 2021,  Bilateral internal carotid artery fibromuscular dysplasia, noted on CT angio on April 2, 2021,  Physical deconditioning.  Lipid panel showed total cholesterol of 146, HDL of 68, LDL of 68, and triglyceride of 52, therefore neurology deferred initiation of statin therapy at this time.  Plan:  Continue aspirin 81 mg p.o. daily and Plavix 75 mg p.o. daily through April 26, 2021, then only aspirin 81 mg p.o. daily  30-day  cardiac event monitor to evaluate for atrial fibrillation  Continue PT/OT evaluation and therapy    Cognitive impairment.  On my today's examination. patient has severe cognitive deficits.  Reportedly patient was previously independent.  Suspect she has developed vascular dementia.  Plan  Formal cognitive evaluation by OT  Staff to assist with daily care and mobility    Elevated troponin I.  Likely demand ischemia secondary to stroke.  Echocardiogram showed normal LV systolic function and no regional wall motion abnormalities.  Lipid panel showed total cholesterol of 146, HDL of 68, LDL of 68, and triglyceride of 52.  Plan:  Continue antiplatelet therapy as above    CKD stage IIIa.  Baseline creatinine 1.1-1.2.  Last creatinine was 1.19 on April 4.  Plan:  Avoid NSAIDs and nephrotoxins  Monitor renal function periodically    Essential hypertension,  Bilateral lower extremity venous insufficiency.  Goal SBP<140.  Plan:  Continue PTA amlodipine 10 mg p.o. daily, losartan 100 mg p.o. daily, and torsemide 5 mg p.o. daily  Monitor blood pressure    Chronic anemia.  Baseline hemoglobin 10-11.  Last hemoglobin was 10.3 on April 4.  Plan:  Monitor Hgb periodically    History of hyponatremia.  Last Na level was 136 and stable.  Plan:  Monitor Na level periodically    Physical deconditioning.  Plan:  Continue PT/OT evaluation and therapy      Orders written by provider at facility:  None.          Electronically signed by:  Boris Driscoll MD

## 2021-04-08 NOTE — TELEPHONE ENCOUNTER
RECORDS RECEIVED FROM: Internal   Date of Appt: 6/24/21   NOTES (FOR ALL VISITS) STATUS DETAILS   OFFICE NOTE from referring provider Internal SEE INPATIENT NOTES   OFFICE NOTE from other specialist N/A    DISCHARGE SUMMARY from hospital [facility]:  4/2/21-4/6/21   DISCHARGE REPORT from the ER N/A    OPERATIVE REPORT N/A    MEDICATION LIST Internal    IMAGING  (FOR ALL VISITS)     EMG N/A    EEG N/A    LUMBAR PUNCTURE N/A    NATALIE SCAN N/A    ULTRASOUND (CAROTID BILAT) *VASCULAR* N/A    MRI (HEAD, NECK, SPINE) [facility]:  MRA Head 4/18/15  MRI Brain 4/18/15   CT (HEAD, NECK, SPINE) [facility]:  CTA Head Neck 4/2/21  CT Head 4/2/21  CT Head 4/2/21

## 2021-04-09 ENCOUNTER — NURSING HOME VISIT (OUTPATIENT)
Dept: GERIATRICS | Facility: CLINIC | Age: 86
End: 2021-04-09
Payer: MEDICARE

## 2021-04-09 VITALS
HEIGHT: 64 IN | BODY MASS INDEX: 26.26 KG/M2 | TEMPERATURE: 97.2 F | SYSTOLIC BLOOD PRESSURE: 142 MMHG | OXYGEN SATURATION: 96 % | RESPIRATION RATE: 18 BRPM | DIASTOLIC BLOOD PRESSURE: 70 MMHG | WEIGHT: 153.8 LBS | HEART RATE: 78 BPM

## 2021-04-09 DIAGNOSIS — R41.89 COGNITIVE IMPAIRMENT: ICD-10-CM

## 2021-04-09 DIAGNOSIS — R53.81 PHYSICAL DECONDITIONING: ICD-10-CM

## 2021-04-09 DIAGNOSIS — D64.9 CHRONIC ANEMIA: ICD-10-CM

## 2021-04-09 DIAGNOSIS — I63.432 CEREBROVASCULAR ACCIDENT (CVA) DUE TO EMBOLISM OF LEFT POSTERIOR CEREBRAL ARTERY (H): Primary | ICD-10-CM

## 2021-04-09 DIAGNOSIS — I10 ESSENTIAL HYPERTENSION: ICD-10-CM

## 2021-04-09 DIAGNOSIS — N18.31 STAGE 3A CHRONIC KIDNEY DISEASE (H): ICD-10-CM

## 2021-04-09 DIAGNOSIS — I24.89 DEMAND ISCHEMIA (H): ICD-10-CM

## 2021-04-09 PROCEDURE — 99305 1ST NF CARE MODERATE MDM 35: CPT | Performed by: INTERNAL MEDICINE

## 2021-04-09 ASSESSMENT — MIFFLIN-ST. JEOR: SCORE: 1074.69

## 2021-04-11 VITALS
WEIGHT: 153.8 LBS | HEART RATE: 90 BPM | OXYGEN SATURATION: 97 % | RESPIRATION RATE: 20 BRPM | BODY MASS INDEX: 26.82 KG/M2 | TEMPERATURE: 97.5 F | DIASTOLIC BLOOD PRESSURE: 74 MMHG | SYSTOLIC BLOOD PRESSURE: 145 MMHG

## 2021-04-11 NOTE — PROGRESS NOTES
[place] [facility]  [place] Medical Record Number:  [id number]  Place of Service where encounter took place:  [facility] [[id number]]  Chief Complaint   Patient presents with     RECHECK       HPI:    Tanisha Muhammad  is a 89 year old (9/10/1926), who is being seen today for an episodic care visit.  HPI information obtained from: {FGS HPI:548415}. Today's concern is:  {FGS DX:143574}    Past Medical and Surgical History reviewed in Epic today.    MEDICATIONS:  {Providers Please double check the med list (in the plan section >> meds & orders tab) and Discontinue any of the meds flagged by the TC to be discontinued}  Current Outpatient Medications   Medication Sig Dispense Refill     amLODIPine (NORVASC) 10 MG tablet Take 1 tablet (10 mg) by mouth daily 90 tablet 3     aspirin (ASA) 81 MG EC tablet Take 1 tablet (81 mg) by mouth daily       clopidogrel (PLAVIX) 75 MG tablet 1 tablet (75 mg) by Oral or NG Tube route daily for 19 days 19 tablet 0     estradiol (ESTRACE VAGINAL) 0.1 MG/GM vaginal cream Place 1 g vaginally twice a week Place 1g in vagina twice weekly (Patient taking differently: Place 1 g vaginally as needed Place 1g in vagina twice weekly PRN) 42.5 g 3     losartan (COZAAR) 100 MG tablet TAKE 1 TABLET(100 MG) BY MOUTH DAILY 90 tablet 3     multivitamin, therapeutic (THERA-VIT) TABS tablet Take 1 tablet by mouth daily       torsemide (DEMADEX) 5 MG tablet Take 1 tablet (5 mg) by mouth daily Take in AM 90 tablet 3     ***    REVIEW OF SYSTEMS:  {ROS FGS:898248}    Objective:  BP (!) 145/74   Pulse 90   Temp 97.5  F (36.4  C)   Resp 20   Wt 69.8 kg (153 lb 12.8 oz)   SpO2 97%   BMI 26.82 kg/m    Exam:  {Nursing home physical exam :482952}    Labs:   {fgslab:314518}    ASSESSMENT/PLAN:  {FGS DX:610592}    {fgsorders:292035}  ***    {fgstime1:166398}  Electronically signed by:  Carrington Giron ***  {Providers Please double check the med list (in the plan section >> meds &  orders tab) and Discontinue any of the meds flagged by the TC to be discontinued}

## 2021-04-12 ENCOUNTER — DOCUMENTATION ONLY (OUTPATIENT)
Dept: OTHER | Facility: CLINIC | Age: 86
End: 2021-04-12

## 2021-04-12 ENCOUNTER — NURSING HOME VISIT (OUTPATIENT)
Dept: GERIATRICS | Facility: CLINIC | Age: 86
End: 2021-04-12
Payer: MEDICARE

## 2021-04-12 DIAGNOSIS — K59.01 SLOW TRANSIT CONSTIPATION: ICD-10-CM

## 2021-04-12 DIAGNOSIS — E87.1 HYPONATREMIA: ICD-10-CM

## 2021-04-12 DIAGNOSIS — I10 ESSENTIAL HYPERTENSION, BENIGN: ICD-10-CM

## 2021-04-12 DIAGNOSIS — N18.32 STAGE 3B CHRONIC KIDNEY DISEASE (H): ICD-10-CM

## 2021-04-12 DIAGNOSIS — I63.9 CEREBROVASCULAR ACCIDENT (CVA), UNSPECIFIED MECHANISM (H): Primary | ICD-10-CM

## 2021-04-12 DIAGNOSIS — R79.89 ELEVATED TROPONIN: ICD-10-CM

## 2021-04-12 DIAGNOSIS — F33.9 EPISODE OF RECURRENT MAJOR DEPRESSIVE DISORDER, UNSPECIFIED DEPRESSION EPISODE SEVERITY (H): ICD-10-CM

## 2021-04-12 PROBLEM — F32.9 MAJOR DEPRESSIVE DISORDER: Status: ACTIVE | Noted: 2021-04-12

## 2021-04-12 PROCEDURE — 99309 SBSQ NF CARE MODERATE MDM 30: CPT | Performed by: NURSE PRACTITIONER

## 2021-04-12 PROCEDURE — 99207 PR CDG-MDM COMPONENT: MEETS MODERATE - DOWN CODED: CPT | Performed by: NURSE PRACTITIONER

## 2021-04-12 RX ORDER — AMOXICILLIN 250 MG
1 CAPSULE ORAL DAILY
COMMUNITY

## 2021-04-12 NOTE — PROGRESS NOTES
[place] [facility]  [place] Medical Record Number:  [id number]  Place of Service where encounter took place:  [facility] [[id number]]  Chief Complaint   Patient presents with     RECHECK       HPI:    Tanisha Muhammad  is a 89 year old (9/10/1926), who is being seen today for an episodic care visit.  HPI information obtained from: facility chart records, facility staff, patient report and [facility] chart review.     This is a 89 yo female that is a recent transfer from [facility] with an admission on 4/2/21 and discharge on 4/6/21 after presenting to acute confusion and fatigue. She was found to have ROGER with Cr 1.21, demand ischemia with trp 2.3, slightly anemic and negative covid test. Per CT, found to have moderate sized evolving acute ischemic infarct in the left PCA territory involving the mesial temporal structures and inferomedial left temporal and occipital junction, chronic infarcts in the right parietal/occipital junction, left parietal cortex, and  chronic lacunar infarcts in the left thalamus, bilateral basal ganglia, and bilateral cerebellar hemispheres..  CTA head and neck showed acute occlusion of the left P2 segment as well as luminal irregularity of the right greater than left internal carotid arteries consistent with fibromuscular dysplasia.  Echocardiogram revealed normal LV systolic function with EF of 65-70%, no regional wall motion abnormalities, and 1+ mitral and tricuspid regurgitation w/o shunt. She was seen by neurology, loaded on plavix and ASA. Patient could not tolerate MRI.  Plan is to continue aspirin and Plavix for 21 days, then only aspirin at 81 mg daily indefinitely. Neurology did not recommend initiation of a statin therapy.  Elevated troponin I felt to be secondary to demand ischemia in the setting of acute stroke.  TCU was recommended by therapies.  Neurology did recommend 30-day cardiac event monitor to evaluate for atrial  fibrillation.    Today's concern is:  CVA    Past Medical and Surgical History reviewed in Epic today.    MEDICATIONS:    Current Outpatient Medications   Medication Sig Dispense Refill     amLODIPine (NORVASC) 10 MG tablet Take 1 tablet (10 mg) by mouth daily 90 tablet 3     aspirin (ASA) 81 MG EC tablet Take 1 tablet (81 mg) by mouth daily       clopidogrel (PLAVIX) 75 MG tablet 1 tablet (75 mg) by Oral or NG Tube route daily for 19 days 19 tablet 0     estradiol (ESTRACE VAGINAL) 0.1 MG/GM vaginal cream Place 1 g vaginally twice a week Place 1g in vagina twice weekly (Patient taking differently: Place 1 g vaginally as needed Place 1g in vagina twice weekly PRN) 42.5 g 3     losartan (COZAAR) 100 MG tablet TAKE 1 TABLET(100 MG) BY MOUTH DAILY 90 tablet 3     multivitamin, therapeutic (THERA-VIT) TABS tablet Take 1 tablet by mouth daily       torsemide (DEMADEX) 5 MG tablet Take 1 tablet (5 mg) by mouth daily Take in AM 90 tablet 3       REVIEW OF SYSTEMS:  10 point ROS of systems including Constitutional, Eyes, Respiratory, Cardiovascular, Gastroenterology, Genitourinary, Integumentary, Musculoskeletal, Psychiatric were all negative except for pertinent positives noted in my HPI.    Objective:  BP (!) 145/74   Pulse 90   Temp 97.5  F (36.4  C)   Resp 20   Wt 69.8 kg (153 lb 12.8 oz)   SpO2 97%   BMI 26.82 kg/m    Exam:  GENERAL APPEARANCE:  Alert, cooperative, denies pain  ENT:  Mouth and posterior oropharynx normal, moist mucous membranes, [place]  NECK:  No adenopathy,masses or thyromegaly  RESP:  respiratory effort and palpation of chest normal, lungs clear to auscultation , no respiratory distress  CV:  Palpation and auscultation of heart done , regular rate and rhythm, no murmur, rub, or gallop, no edema  ABDOMEN:  normal bowel sounds, soft, nontender, no hepatosplenomegaly or other masses, has constipation  M/S:   No weakness noted, able to move all extremities  SKIN:  Inspection of skin and subcutaneous  tissue baseline  NEURO:   No focal deficit  PSYCH:  oriented to self, insight and judgement impaired    Labs:     Most Recent 3 CBC's:  Recent Labs   Lab Test 04/04/21  0704 04/02/21  2218 03/28/21  0608   WBC 4.4 5.4 5.2   HGB 10.3* 10.3* 11.5*   MCV 84 84 79    159 138*     Most Recent 3 BMP's:  Recent Labs   Lab Test 04/04/21  0704 04/03/21  1357 04/02/21  2218    138 134   POTASSIUM 4.3 3.8 4.0   CHLORIDE 107 108 104   CO2 24 24 21   BUN 26 23 30   CR 1.19* 1.13* 1.21*   ANIONGAP 5 6 9   GALINA 9.9 10.1 9.8   GLC 98 181* 124*       ASSESSMENT/PLAN:    Cerebrovascular accident (CVA), unspecified mechanism (H)  Acute ischemic infarct, left PCA territory  Patient was independent with ADLs, now therapy advising LTC in memory care per significant deficits in cognition, pending care conference on 4/13 with family. F/u with neurology on 6/24    Essential hypertension, benign  Continue amlodipine 10mg daily, cozaar 100mg daily and demadex 5mg daily, SBP <130, monitor BP in evening x5d    Stage 3b chronic kidney disease  Last Cr 1.19 with GFR 39 on 4/4, recheck on 4/13    Hyponatremia  Last Na 136, recheck BMP on 4/13    Elevated troponin  Last Trp 1.69, demand ischemia, denies CP    Slow transit constipation  Start senna S 1 tab daily    Episode of recurrent major depressive disorder, unspecified depression episode severity (H)  Escitalopram has been discontinued, may need another agent      Orders written by provider at facility  Start senna S, labs, BP monitoring    Total time spent with patient visit at the skilled nursing facility was 37 including patient visit and review of past records. Greater than 50% of total time spent with counseling and coordinating care due to cognitive deficits, constipation tx, BP monitoring and therapy per cognition issues, which lasted 22 minutes.    Electronically signed by:  Anson Cano NP

## 2021-04-13 ENCOUNTER — HOSPITAL LABORATORY (OUTPATIENT)
Dept: OTHER | Facility: CLINIC | Age: 86
End: 2021-04-13

## 2021-04-13 LAB
ANION GAP SERPL CALCULATED.3IONS-SCNC: 6 MMOL/L (ref 3–14)
BUN SERPL-MCNC: 32 MG/DL (ref 7–30)
CALCIUM SERPL-MCNC: 9.5 MG/DL (ref 8.5–10.1)
CHLORIDE SERPL-SCNC: 108 MMOL/L (ref 94–109)
CO2 SERPL-SCNC: 25 MMOL/L (ref 20–32)
CREAT SERPL-MCNC: 1.07 MG/DL (ref 0.52–1.04)
ERYTHROCYTE [DISTWIDTH] IN BLOOD BY AUTOMATED COUNT: 13.1 % (ref 10–15)
GFR SERPL CREATININE-BSD FRML MDRD: 44 ML/MIN/{1.73_M2}
GLUCOSE SERPL-MCNC: 92 MG/DL (ref 70–99)
HCT VFR BLD AUTO: 31.2 % (ref 35–47)
HGB BLD-MCNC: 10.6 G/DL (ref 11.7–15.7)
MCH RBC QN AUTO: 29 PG (ref 26.5–33)
MCHC RBC AUTO-ENTMCNC: 34 G/DL (ref 31.5–36.5)
MCV RBC AUTO: 86 FL (ref 78–100)
PLATELET # BLD AUTO: 169 10E9/L (ref 150–450)
POTASSIUM SERPL-SCNC: 3.7 MMOL/L (ref 3.4–5.3)
RBC # BLD AUTO: 3.65 10E12/L (ref 3.8–5.2)
SODIUM SERPL-SCNC: 139 MMOL/L (ref 133–144)
WBC # BLD AUTO: 5.2 10E9/L (ref 4–11)

## 2021-04-14 VITALS
RESPIRATION RATE: 18 BRPM | HEART RATE: 86 BPM | TEMPERATURE: 98.3 F | WEIGHT: 153.8 LBS | SYSTOLIC BLOOD PRESSURE: 159 MMHG | DIASTOLIC BLOOD PRESSURE: 78 MMHG | BODY MASS INDEX: 26.82 KG/M2 | OXYGEN SATURATION: 99 %

## 2021-04-15 ENCOUNTER — NURSING HOME VISIT (OUTPATIENT)
Dept: GERIATRICS | Facility: CLINIC | Age: 86
End: 2021-04-15
Payer: MEDICARE

## 2021-04-15 DIAGNOSIS — I63.9 CEREBROVASCULAR ACCIDENT (CVA), UNSPECIFIED MECHANISM (H): Primary | ICD-10-CM

## 2021-04-15 DIAGNOSIS — N18.32 STAGE 3B CHRONIC KIDNEY DISEASE (H): ICD-10-CM

## 2021-04-15 DIAGNOSIS — K59.01 SLOW TRANSIT CONSTIPATION: ICD-10-CM

## 2021-04-15 DIAGNOSIS — E55.9 VITAMIN D DEFICIENCY: ICD-10-CM

## 2021-04-15 DIAGNOSIS — F33.9 EPISODE OF RECURRENT MAJOR DEPRESSIVE DISORDER, UNSPECIFIED DEPRESSION EPISODE SEVERITY (H): ICD-10-CM

## 2021-04-15 DIAGNOSIS — I10 ESSENTIAL HYPERTENSION, BENIGN: ICD-10-CM

## 2021-04-15 PROCEDURE — 99309 SBSQ NF CARE MODERATE MDM 30: CPT | Performed by: NURSE PRACTITIONER

## 2021-04-15 NOTE — PROGRESS NOTES
[place] [facility]  [place] Medical Record Number:  [id number]  Place of Service where encounter took place:  [facility] ([facility]) [[id number]]  Chief Complaint   Patient presents with     RECHECK       HPI:    Tanisha Muhammad  is a 89 year old (9/10/1926), who is being seen today for an episodic care visit.  HPI information obtained from: facility chart records, facility staff, patient report and [facility] chart review.     This is a 89 yo female that is a recent transfer from [facility] with an admission on 4/2/21 and discharge on 4/6/21 after presenting to acute confusion and fatigue. She was found to have ROGER with Cr 1.21, demand ischemia with trp 2.3, slightly anemic and negative covid test. Per CT, found to have moderate sized evolving acute ischemic infarct in the left PCA territory involving the mesial temporal structures and inferomedial left temporal and occipital junction, chronic infarcts in the right parietal/occipital junction, left parietal cortex, and  chronic lacunar infarcts in the left thalamus, bilateral basal ganglia, and bilateral cerebellar hemispheres..  CTA head and neck showed acute occlusion of the left P2 segment as well as luminal irregularity of the right greater than left internal carotid arteries consistent with fibromuscular dysplasia.  Echocardiogram revealed normal LV systolic function with EF of 65-70%, no regional wall motion abnormalities, and 1+ mitral and tricuspid regurgitation w/o shunt. She was seen by neurology, loaded on plavix and ASA. Patient could not tolerate MRI.  Plan is to continue aspirin and Plavix for 21 days, then only aspirin at 81 mg daily indefinitely. Neurology did not recommend initiation of a statin therapy.  Elevated troponin I felt to be secondary to demand ischemia in the setting of acute stroke.  TCU was recommended by therapies.  Neurology did recommend 30-day cardiac event monitor to evaluate for atrial  fibrillation.    Today's concern is:  anxiety, htn    Past Medical and Surgical History reviewed in Epic today.    MEDICATIONS:    Current Outpatient Medications   Medication Sig Dispense Refill     busPIRone (BUSPAR) 5 MG tablet Take 5 mg by mouth 3 times daily       amLODIPine (NORVASC) 10 MG tablet Take 1 tablet (10 mg) by mouth daily 90 tablet 3     aspirin (ASA) 81 MG EC tablet Take 1 tablet (81 mg) by mouth daily       clopidogrel (PLAVIX) 75 MG tablet 1 tablet (75 mg) by Oral or NG Tube route daily for 19 days 19 tablet 0     estradiol (ESTRACE VAGINAL) 0.1 MG/GM vaginal cream Place 1 g vaginally twice a week Place 1g in vagina twice weekly (Patient taking differently: Place 1 g vaginally as needed Place 1g in vagina twice weekly PRN) 42.5 g 3     losartan (COZAAR) 100 MG tablet TAKE 1 TABLET(100 MG) BY MOUTH DAILY 90 tablet 3     multivitamin, therapeutic (THERA-VIT) TABS tablet Take 1 tablet by mouth daily       senna-docusate (SENOKOT-S/PERICOLACE) 8.6-50 MG tablet Take 1 tablet by mouth daily       torsemide (DEMADEX) 5 MG tablet Take 1 tablet (5 mg) by mouth daily Take in AM 90 tablet 3       REVIEW OF SYSTEMS:  4 point ROS including Respiratory, CV, GI and , other than that noted in the HPI,  is negative    Objective:  BP (!) 159/78   Pulse 86   Temp 98.3  F (36.8  C)   Resp 18   Wt 69.8 kg (153 lb 12.8 oz)   SpO2 99%   BMI 26.82 kg/m    Exam:  GENERAL APPEARANCE:  Alert, cooperative, denies pain  RESP:  respiratory effort and palpation of chest normal, lungs clear to auscultation , no respiratory distress  CV:  Palpation and auscultation of heart done , regular rate and rhythm, no murmur, rub, or gallop, no edema  PSYCH:  oriented to self, insight and judgement impaired    Labs:     Most Recent 3 CBC's:  Recent Labs   Lab Test 04/13/21  0708 04/04/21  0704 04/02/21  2218   WBC 5.2 4.4 5.4   HGB 10.6* 10.3* 10.3*   MCV 86 84 84    172 159     Most Recent 3 BMP's:  Recent Labs   Lab Test  04/13/21  0708 04/04/21  0704 04/03/21  1357    136 138   POTASSIUM 3.7 4.3 3.8   CHLORIDE 108 107 108   CO2 25 24 24   BUN 32* 26 23   CR 1.07* 1.19* 1.13*   ANIONGAP 6 5 6   GALINA 9.5 9.9 10.1   GLC 92 98 181*       ASSESSMENT/PLAN:    Cerebrovascular accident (CVA), unspecified mechanism (H)  Acute ischemic infarct, left PCA territory  Patient was independent with ADLs, now therapy advising LTC in memory care per significant deficits in cognition, pending care conference on 4/13 with family. F/u with neurology on 6/24     Essential hypertension, benign  Continue amlodipine 10mg daily, cozaar 100mg daily and demadex 5mg daily, will start hydralazine 10mg daily PRN as well     Stage 3b chronic kidney disease  Last Cr 1.07 with GFR 44 on 4/13     Hyponatremia  Last Na 139 on 4/13     Elevated troponin  Last Trp 1.69, demand ischemia, denies CP     Slow transit constipation  Continue senna S 1 tab daily     Episode of recurrent major depressive disorder, unspecified depression episode severity (H)  Escitalopram has been discontinued, will trial buspar 5mg TID    Start buspar and hydralazine      Electronically signed by:  Anson Cano NP

## 2021-04-16 ENCOUNTER — HOSPITAL LABORATORY (OUTPATIENT)
Dept: OTHER | Facility: CLINIC | Age: 86
End: 2021-04-16

## 2021-04-16 RX ORDER — HYDRALAZINE HYDROCHLORIDE 10 MG/1
10 TABLET, FILM COATED ORAL DAILY PRN
COMMUNITY

## 2021-04-16 RX ORDER — BUSPIRONE HYDROCHLORIDE 5 MG/1
5 TABLET ORAL 3 TIMES DAILY
COMMUNITY

## 2021-04-17 ENCOUNTER — TELEPHONE (OUTPATIENT)
Dept: GERIATRICS | Facility: CLINIC | Age: 86
End: 2021-04-17

## 2021-04-17 LAB
SARS-COV-2 RNA RESP QL NAA+PROBE: NORMAL
SPECIMEN SOURCE: NORMAL

## 2021-04-17 NOTE — TELEPHONE ENCOUNTER
Staff called to report that this morning resident refused her morning medications.    Time of the call was around 330 or after.    Staff red off all the medications.      Feel she will be OK.  Staff can call if her status changes and can see what she does tomorrow.    No new orders    Electronically signed by Dalia Park RN, CNP

## 2021-04-18 VITALS
WEIGHT: 153.8 LBS | SYSTOLIC BLOOD PRESSURE: 155 MMHG | BODY MASS INDEX: 26.82 KG/M2 | HEART RATE: 91 BPM | RESPIRATION RATE: 19 BRPM | OXYGEN SATURATION: 97 % | DIASTOLIC BLOOD PRESSURE: 68 MMHG | TEMPERATURE: 98.8 F

## 2021-04-18 LAB
LABORATORY COMMENT REPORT: NORMAL
SARS-COV-2 RNA RESP QL NAA+PROBE: NEGATIVE
SPECIMEN SOURCE: NORMAL

## 2021-04-19 ENCOUNTER — NURSING HOME VISIT (OUTPATIENT)
Dept: GERIATRICS | Facility: CLINIC | Age: 86
End: 2021-04-19
Payer: MEDICARE

## 2021-04-19 DIAGNOSIS — F01.53 VASCULAR DEMENTIA WITH DEPRESSED MOOD (H): ICD-10-CM

## 2021-04-19 DIAGNOSIS — I63.9 CEREBROVASCULAR ACCIDENT (CVA), UNSPECIFIED MECHANISM (H): Primary | ICD-10-CM

## 2021-04-19 DIAGNOSIS — I10 ESSENTIAL HYPERTENSION, BENIGN: ICD-10-CM

## 2021-04-19 DIAGNOSIS — R53.81 PHYSICAL DECONDITIONING: ICD-10-CM

## 2021-04-19 PROCEDURE — 99309 SBSQ NF CARE MODERATE MDM 30: CPT | Performed by: NURSE PRACTITIONER

## 2021-04-19 NOTE — PROGRESS NOTES
[place] [facility]  [place] Medical Record Number:  [id number]  [facility] [[id number][facility]  Chief Complaint   Patient presents with     RECHECK       HPI:    Tanisha Muhammad  is a 89 year old (9/10/1926), who is being seen today for an episodic care visit.  HPI information obtained from: facility chart records, facility staff, patient report and [facility] chart review. Today's concern is:  Patient has been in TCU since 4/6/21 following hospitalization due to confusion and fatigue. PMH includes hypertension, hyponatremia, depression, gout, osteoarthritis chronic anemia, and CKD3.  During hospitalization she was treated for acute left PCA ischemic stroke, likely embolic. She was also noted to have several chronic strokes in right parietal/occiptial junction, left parietal cortex and chronic lacunar infarcts. During hospitalization she was found to have elevated troponin, which was attributed to demand ischemia due to stroke. Once stable she was transferred to TCU.  Since in TCU she is dependent on nursing staff for ADLs. Buspar was started for anxiety. She cannot tell me where she lives. She is worried about where she is going.   SW is working with nephew on trying to find safe setting for her.    Past Medical and Surgical History reviewed in Epic today.    MEDICATIONS:    Current Outpatient Medications   Medication Sig Dispense Refill     amLODIPine (NORVASC) 10 MG tablet Take 1 tablet (10 mg) by mouth daily 90 tablet 3     aspirin (ASA) 81 MG EC tablet Take 1 tablet (81 mg) by mouth daily       busPIRone (BUSPAR) 5 MG tablet Take 5 mg by mouth 3 times daily       clopidogrel (PLAVIX) 75 MG tablet 1 tablet (75 mg) by Oral or NG Tube route daily for 19 days 19 tablet 0     estradiol (ESTRACE VAGINAL) 0.1 MG/GM vaginal cream Place 1 g vaginally twice a week Place 1g in vagina twice weekly (Patient taking differently: Place 1 g vaginally as  needed Place 1g in vagina twice weekly PRN) 42.5 g 3     hydrALAZINE (APRESOLINE) 10 MG tablet Take 10 mg by mouth daily as needed For SBP >160       losartan (COZAAR) 100 MG tablet TAKE 1 TABLET(100 MG) BY MOUTH DAILY 90 tablet 3     multivitamin, therapeutic (THERA-VIT) TABS tablet Take 1 tablet by mouth daily       senna-docusate (SENOKOT-S/PERICOLACE) 8.6-50 MG tablet Take 1 tablet by mouth daily       torsemide (DEMADEX) 5 MG tablet Take 1 tablet (5 mg) by mouth daily Take in AM 90 tablet 3         REVIEW OF SYSTEMS:  Limited secondary to cognitive impairment but today pt reports she no pain, no shortness of breath    Objective:  BP (!) 155/68   Pulse 91   Temp 98.8  F (37.1  C)   Resp 19   Wt 69.8 kg (153 lb 12.8 oz)   SpO2 97%   BMI 26.82 kg/m    Exam:  GENERAL APPEARANCE:  Alert, in no distress  ENT:  Mouth and posterior oropharynx normal, moist mucous membranes, hearing acuity [place]  EYES:  EOM, conjunctivae, lids, pupils and irises normal    RESP:  respiratory effort  normal, no respiratory distress,   CV:   Edema none  ABDOMEN:  normal bowel sounds, soft, nontender, no hepatosplenomegaly or other masses  M/S:   Gait and station not observedd, Digits and nails normal   SKIN:  Inspection/Palpation of skin and subcutaneous tissue no rash  NEURO: 2-12 in normal limits and at patient's baseline  PSYCH:  insight and judgement, memory impaired , affect and mood normal    Labs:   CBC RESULTS:   Recent Labs   Lab Test 04/13/21  0708 04/04/21  0704   WBC 5.2 4.4   RBC 3.65* 3.53*   HGB 10.6* 10.3*   HCT 31.2* 29.7*   MCV 86 84   MCH 29.0 29.2   MCHC 34.0 34.7   RDW 13.1 13.0    172       Last Basic Metabolic Panel:  Recent Labs   Lab Test 04/13/21  0708 04/04/21  0704    136   POTASSIUM 3.7 4.3   CHLORIDE 108 107   GALINA 9.5 9.9   CO2 25 24   BUN 32* 26   CR 1.07* 1.19*   GLC 92 98       Liver Function Studies -   Recent Labs   Lab Test 04/02/21 2218 10/13/17  1720   PROTTOTAL 7.7 7.3   ALBUMIN 3.9  4.0   BILITOTAL 0.5 0.3   ALKPHOS 65 80   AST 37 18   ALT 39 24       TSH   Date Value Ref Range Status   03/27/2021 1.73 0.40 - 4.00 mU/L Final   08/03/2019 3.09 0.40 - 4.00 mU/L Final   ]    Lab Results   Component Value Date    A1C 5.6 04/02/2021    A1C 5.7 05/21/2010           ASSESSMENT/PLAN:  Cerebrovascular accident (CVA), unspecified mechanism (H)  Vascular dementia with depressed mood (H)  Physical deconditioning  Recent stroke now with significant cognitive impairment. SLUMS 2/30. She was living alone in an apartment. SW is working with nephew on trying to find safe place for her to move to.   She is walking house hold distances  -continue aSA 81mg q day  -continue plavix 75mg q day until 4/25  -SW to work with family on setting up discharge plan    Essential hypertension, benign  BPS running 155/68, 168/93, 146/81, 164/73  BP goal is <140. She has been refusing medications which is likely contributing to elevated BP  -continue to encourage patient to take anti hypertensives    Electronically signed by:  CRYSTAL Hardin CNP

## 2021-04-20 ENCOUNTER — PATIENT OUTREACH (OUTPATIENT)
Dept: CARE COORDINATION | Facility: CLINIC | Age: 86
End: 2021-04-20

## 2021-04-20 VITALS
HEART RATE: 92 BPM | BODY MASS INDEX: 26.82 KG/M2 | SYSTOLIC BLOOD PRESSURE: 194 MMHG | OXYGEN SATURATION: 96 % | DIASTOLIC BLOOD PRESSURE: 83 MMHG | RESPIRATION RATE: 20 BRPM | WEIGHT: 153.8 LBS | TEMPERATURE: 98.4 F

## 2021-04-20 ASSESSMENT — ACTIVITIES OF DAILY LIVING (ADL): DEPENDENT_IADLS:: INDEPENDENT

## 2021-04-20 NOTE — PROGRESS NOTES
Clinic Care Coordination Contact    Situation: Patient chart reviewed by care coordinator.    Background: Patient was hospitalized at [facility] from 4/2/21 to 4/6/21 with diagnosis of CVA.  Patient was discharged to [facility] for ongoing medical management and rehab.  CC  RN following patient's TCU admission.    Assessment: Per chart review, patient remains admitted at TCU.  Patient has been refusing hypertension medications so her blood pressures have been elevated.  Patient ha significant cognitive impairment following her stroke; SW is following and working with patient's nephew to arrange safe discharge plan.  No specific discharge plans noted at this time.    Plan/Recommendations: CC RN will continue to follow patient's TCU admission.  CC RN will review patient's chart again in approximately 2 weeks to get updates on patient's status and discharge disposition.    Mick Bhagat RN  Clinic Care Coordinator  [facility]  [facility]  Ph: [phone]

## 2021-04-21 ENCOUNTER — NURSING HOME VISIT (OUTPATIENT)
Dept: GERIATRICS | Facility: CLINIC | Age: 86
End: 2021-04-21
Payer: MEDICARE

## 2021-04-21 DIAGNOSIS — I63.9 CEREBROVASCULAR ACCIDENT (CVA), UNSPECIFIED MECHANISM (H): Primary | ICD-10-CM

## 2021-04-21 DIAGNOSIS — N18.32 STAGE 3B CHRONIC KIDNEY DISEASE (H): ICD-10-CM

## 2021-04-21 DIAGNOSIS — F01.53 VASCULAR DEMENTIA WITH DEPRESSED MOOD (H): ICD-10-CM

## 2021-04-21 DIAGNOSIS — E55.9 VITAMIN D DEFICIENCY: ICD-10-CM

## 2021-04-21 DIAGNOSIS — F33.9 EPISODE OF RECURRENT MAJOR DEPRESSIVE DISORDER, UNSPECIFIED DEPRESSION EPISODE SEVERITY (H): ICD-10-CM

## 2021-04-21 DIAGNOSIS — K59.01 SLOW TRANSIT CONSTIPATION: ICD-10-CM

## 2021-04-21 DIAGNOSIS — I10 ESSENTIAL HYPERTENSION, BENIGN: ICD-10-CM

## 2021-04-21 PROCEDURE — 99309 SBSQ NF CARE MODERATE MDM 30: CPT | Performed by: NURSE PRACTITIONER

## 2021-04-21 NOTE — PROGRESS NOTES
[facility]  [place] Medical Record Number:  [id number]  Place of Service where encounter took place:  [facility] ([facility]) [[id number]]  Chief Complaint   Patient presents with     RECHECK       HPI:    Tanisha Muhammad  is a 89 year old (9/10/1926), who is being seen today for an episodic care visit.  HPI information obtained from: facility chart records, facility staff, patient report and [facility] chart review.     Per chart review:  This is a 89 yo female that is a recent transfer from [facility] with an admission on 4/2/21 and discharge on 4/6/21 after presenting to acute confusion and fatigue. She was found to have ROGER with Cr 1.21, demand ischemia with trp 2.3, slightly anemic and negative covid test. Per CT, found to have moderate sized evolving acute ischemic infarct in the left PCA territory involving the mesial temporal structures and inferomedial left temporal and occipital junction, chronic infarcts in the right parietal/occipital junction, left parietal cortex, and  chronic lacunar infarcts in the left thalamus, bilateral basal ganglia, and bilateral cerebellar hemispheres..  CTA head and neck showed acute occlusion of the left P2 segment as well as luminal irregularity of the right greater than left internal carotid arteries consistent with fibromuscular dysplasia.  Echocardiogram revealed normal LV systolic function with EF of 65-70%, no regional wall motion abnormalities, and 1+ mitral and tricuspid regurgitation w/o shunt. She was seen by neurology, loaded on plavix and ASA. Patient could not tolerate MRI.  Plan is to continue aspirin and Plavix for 21 days, then only aspirin at 81 mg daily indefinitely. Neurology did not recommend initiation of a statin therapy.  Elevated troponin I felt to be secondary to demand ischemia in the setting of acute stroke.  TCU was recommended by therapies.  Neurology did recommend 30-day cardiac event monitor to evaluate  for atrial fibrillation.    Today's concern is:  behaviors    Past Medical and Surgical History reviewed in Epic today.    MEDICATIONS:    Current Outpatient Medications   Medication Sig Dispense Refill     amLODIPine (NORVASC) 10 MG tablet Take 1 tablet (10 mg) by mouth daily 90 tablet 3     aspirin (ASA) 81 MG EC tablet Take 1 tablet (81 mg) by mouth daily       busPIRone (BUSPAR) 5 MG tablet Take 5 mg by mouth 3 times daily       clopidogrel (PLAVIX) 75 MG tablet 1 tablet (75 mg) by Oral or NG Tube route daily for 19 days 19 tablet 0     estradiol (ESTRACE VAGINAL) 0.1 MG/GM vaginal cream Place 1 g vaginally twice a week Place 1g in vagina twice weekly (Patient taking differently: Place 1 g vaginally as needed Place 1g in vagina twice weekly PRN) 42.5 g 3     hydrALAZINE (APRESOLINE) 10 MG tablet Take 10 mg by mouth daily as needed For SBP >160       losartan (COZAAR) 100 MG tablet TAKE 1 TABLET(100 MG) BY MOUTH DAILY 90 tablet 3     multivitamin, therapeutic (THERA-VIT) TABS tablet Take 1 tablet by mouth daily       senna-docusate (SENOKOT-S/PERICOLACE) 8.6-50 MG tablet Take 1 tablet by mouth daily       torsemide (DEMADEX) 5 MG tablet Take 1 tablet (5 mg) by mouth daily Take in AM 90 tablet 3       REVIEW OF SYSTEMS:  4 point ROS including Respiratory, CV, GI and , other than that noted in the HPI,  is negative    Objective:  BP (!) 194/83   Pulse 92   Temp 98.4  F (36.9  C)   Resp 20   Wt 69.8 kg (153 lb 12.8 oz)   SpO2 96%   BMI 26.82 kg/m    Exam:  GENERAL APPEARANCE:  Alert, denies pain, hx of being more combative, not taking medications  RESP:  respiratory effort and palpation of chest normal, lungs clear to auscultation , no respiratory distress  CV:  Palpation and auscultation of heart done , regular rate and rhythm, no murmur, rub, or gallop, no edema  PSYCH:  oriented to self, insight and judgement impaired. SLUMS 2/30  TUG 28.65    Labs:     Most Recent 3 CBC's:  Recent Labs   Lab Test  04/13/21  0708 04/04/21  0704 04/02/21  2218   WBC 5.2 4.4 5.4   HGB 10.6* 10.3* 10.3*   MCV 86 84 84    172 159     Most Recent 3 BMP's:  Recent Labs   Lab Test 04/13/21  0708 04/04/21  0704 04/03/21  1357    136 138   POTASSIUM 3.7 4.3 3.8   CHLORIDE 108 107 108   CO2 25 24 24   BUN 32* 26 23   CR 1.07* 1.19* 1.13*   ANIONGAP 6 5 6   GALINA 9.5 9.9 10.1   GLC 92 98 181*       ASSESSMENT/PLAN:    Cerebrovascular accident (CVA), unspecified mechanism (H)  Acute ischemic infarct, left PCA territory  Patient was independent with ADLs, now therapy advising LTC in memory care per significant deficits in cognition, therapy suggesting LTC MC. F/u with neurology on 6/24    Behaviors  Non-compliant with medications  Has recently stopped taking all medications, advised nursing to have family member talk with patient     Essential hypertension, benign  Continue amlodipine 10mg daily, cozaar 100mg daily and demadex 5mg daily, will start hydralazine 10mg daily PRN as well. SBP<160     Stage 3b chronic kidney disease  Last Cr 1.07 with GFR 44 on 4/13     Hyponatremia  Last Na 139 on 4/13     Elevated troponin  Last Trp 1.69, demand ischemia, denies CP     Slow transit constipation  Continue senna S 1 tab daily     Episode of recurrent major depressive disorder, unspecified depression episode severity (H)  Escitalopram has been discontinued, started buspar 5mg TID    Disposition  Family looking for MC for her given cognitive deficits    Advised to talk with family per patient not taking medications    Electronically signed by:  Anson Cano NP

## 2021-04-22 ENCOUNTER — HOSPITAL LABORATORY (OUTPATIENT)
Dept: OTHER | Facility: CLINIC | Age: 86
End: 2021-04-22

## 2021-04-22 LAB
SARS-COV-2 RNA RESP QL NAA+PROBE: NORMAL
SPECIMEN SOURCE: NORMAL

## 2021-04-26 ENCOUNTER — DISCHARGE SUMMARY NURSING HOME (OUTPATIENT)
Dept: GERIATRICS | Facility: CLINIC | Age: 86
End: 2021-04-26
Payer: MEDICARE

## 2021-04-26 VITALS
OXYGEN SATURATION: 97 % | TEMPERATURE: 98.6 F | BODY MASS INDEX: 27.08 KG/M2 | SYSTOLIC BLOOD PRESSURE: 161 MMHG | RESPIRATION RATE: 18 BRPM | HEART RATE: 52 BPM | WEIGHT: 155.3 LBS | DIASTOLIC BLOOD PRESSURE: 59 MMHG

## 2021-04-26 DIAGNOSIS — I10 ESSENTIAL HYPERTENSION, BENIGN: ICD-10-CM

## 2021-04-26 DIAGNOSIS — E55.9 VITAMIN D DEFICIENCY: ICD-10-CM

## 2021-04-26 DIAGNOSIS — I63.9 CEREBROVASCULAR ACCIDENT (CVA), UNSPECIFIED MECHANISM (H): Primary | ICD-10-CM

## 2021-04-26 DIAGNOSIS — N18.32 STAGE 3B CHRONIC KIDNEY DISEASE (H): ICD-10-CM

## 2021-04-26 DIAGNOSIS — F33.9 EPISODE OF RECURRENT MAJOR DEPRESSIVE DISORDER, UNSPECIFIED DEPRESSION EPISODE SEVERITY (H): ICD-10-CM

## 2021-04-26 DIAGNOSIS — F01.53 VASCULAR DEMENTIA WITH DEPRESSED MOOD (H): ICD-10-CM

## 2021-04-26 DIAGNOSIS — K59.01 SLOW TRANSIT CONSTIPATION: ICD-10-CM

## 2021-04-26 PROCEDURE — 99316 NF DSCHRG MGMT 30 MIN+: CPT | Performed by: NURSE PRACTITIONER

## 2021-04-26 NOTE — PROGRESS NOTES
[facility] DISCHARGE SUMMARY  PATIENT'S NAME: Tanisha Muhammad  YOB: 1926  MEDICAL RECORD NUMBER:  [id number]  Place of Service where encounter took place:  [facility] ([facility]) [[id number]]    PRIMARY CARE PROVIDER AND CLINIC RESPONSIBLE AFTER TRANSFER:   Sesar Cuevas MD, [address] / [place] [address]    [facility] Provider     Transferring providers: Anson Cano NP, Dr. Americo MD  Recent Hospitalization/ED:  [facility] stay 4/2/21 to 4/6/21.  Date of SNF Admission: April 06, 2021  Date of SNF (anticipated) Discharge: April 27, 2021  Discharged to: new assisted living for patient Chanell doss ADL/MC  Cognitive Scores: SLUMS: 2/30  Physical Function: Becerra Balance Scale: 40/56, TUG 28.56 and Ambulating 500 ft with FWW  DME: NA    CODE STATUS/ADVANCE DIRECTIVES DISCUSSION:  Full Code   ALLERGIES: Indomethacin, Keflex [cephalexin], and Sulfamethoxazole-trimethoprim     Per chart review:  This is a 89 yo female that is a recent transfer from [facility] with an admission on 4/2/21 and discharge on 4/6/21 after presenting to acute confusion and fatigue. She was found to have ROGER with Cr 1.21, demand ischemia with trp 2.3, slightly anemic and negative covid test. Per CT, found to have moderate sized evolving acute ischemic infarct in the left PCA territory involving the mesial temporal structures and inferomedial left temporal and occipital junction, chronic infarcts in the right parietal/occipital junction, left parietal cortex, and  chronic lacunar infarcts in the left thalamus, bilateral basal ganglia, and bilateral cerebellar hemispheres..  CTA head and neck showed acute occlusion of the left P2 segment as well as luminal irregularity of the right greater than left internal carotid arteries consistent with fibromuscular dysplasia.  Echocardiogram revealed normal LV systolic function with EF of 65-70%, no regional wall  motion abnormalities, and 1+ mitral and tricuspid regurgitation w/o shunt. She was seen by neurology, loaded on plavix and ASA. Patient could not tolerate MRI.  Plan is to continue aspirin and Plavix for 21 days, then only aspirin at 81 mg daily indefinitely. Neurology did not recommend initiation of a statin therapy.  Elevated troponin I felt to be secondary to demand ischemia in the setting of acute stroke.  TCU was recommended by therapies.  Neurology did recommend 30-day cardiac event monitor to evaluate for atrial fibrillation.    DISCHARGE DIAGNOSIS/NURSING FACILITY COURSE:     Cerebrovascular accident (CVA), unspecified mechanism (H)  Acute ischemic infarct, left PCA territory  Patient was independent with ADLs, now needing significant care, will be discharging to  facility. F/u with neurology on 6/24     Behaviors  Non-compliant with medications  Has recently stopped taking all medications, talked with family, now crushing medications in food     Essential hypertension, benign  Continue amlodipine 10mg daily, cozaar 100mg daily and demadex 5mg daily, with hydralazine 10mg daily PRN as well. SBP<160     Stage 3b chronic kidney disease  Last Cr 1.07 with GFR 44 on 4/13     Hyponatremia  Last Na 139 on 4/13     Elevated troponin  Last Trp 1.69, demand ischemia, denies CP     Slow transit constipation  Continue senna S 1 tab daily     Episode of recurrent major depressive disorder, unspecified depression episode severity (H)  Escitalopram has been discontinued, have started buspar 5mg TID    Past Medical History:  has a past medical history of Cystocele (03/2010), Elevated glucose, Gout (03/2010), Tye's disease (2010), Inactive Ménière's disease, Post-menopausal bleeding (01/02/2014), Rectocele, Trigeminal neuralgia (2015), Unspecified essential hypertension, and Unspecified venous (peripheral) insufficiency.    Discharge Medications:    Current Outpatient Medications   Medication Sig Dispense Refill      amLODIPine (NORVASC) 10 MG tablet Take 1 tablet (10 mg) by mouth daily 90 tablet 3     aspirin (ASA) 81 MG EC tablet Take 1 tablet (81 mg) by mouth daily       busPIRone (BUSPAR) 5 MG tablet Take 5 mg by mouth 3 times daily       clopidogrel (PLAVIX) 75 MG tablet 1 tablet (75 mg) by Oral or NG Tube route daily for 19 days 19 tablet 0     estradiol (ESTRACE VAGINAL) 0.1 MG/GM vaginal cream Place 1 g vaginally twice a week Place 1g in vagina twice weekly (Patient taking differently: Place 1 g vaginally as needed Place 1g in vagina twice weekly PRN) 42.5 g 3     hydrALAZINE (APRESOLINE) 10 MG tablet Take 10 mg by mouth daily as needed For SBP >160       losartan (COZAAR) 100 MG tablet TAKE 1 TABLET(100 MG) BY MOUTH DAILY 90 tablet 3     multivitamin, therapeutic (THERA-VIT) TABS tablet Take 1 tablet by mouth daily       senna-docusate (SENOKOT-S/PERICOLACE) 8.6-50 MG tablet Take 1 tablet by mouth daily       torsemide (DEMADEX) 5 MG tablet Take 1 tablet (5 mg) by mouth daily Take in AM 90 tablet 3     Medication Changes/Rationale:     Have started buspar, lexapro stopped per hyponatremia    Controlled medications sent with patient:   not applicable/none     ROS:   4 point ROS including Respiratory, CV, GI and , other than that noted in the HPI,  is negative    Physical Exam:   Vitals: BP (!) 161/59   Pulse 52   Temp 98.6  F (37  C)   Resp 18   Wt 70.4 kg (155 lb 4.8 oz)   SpO2 97%   BMI 27.08 kg/m    BMI= Body mass index is 27.08 kg/m .  GENERAL APPEARANCE:  Alert, denies pain, hx of being more combative, not taking medications  RESP:  respiratory effort and palpation of chest normal, lungs clear to auscultation , no respiratory distress  CV:  Palpation and auscultation of heart done , regular rate and rhythm, no murmur, rub, or gallop, no edema  PSYCH:  oriented to self, insight and judgement impaired. SLUMS 2/30  TUG 28.65    SNF labs:   Most Recent 3 CBC's:  Recent Labs   Lab Test 04/13/21  0708  04/04/21  0704 04/02/21  2218   WBC 5.2 4.4 5.4   HGB 10.6* 10.3* 10.3*   MCV 86 84 84    172 159     Most Recent 3 BMP's:  Recent Labs   Lab Test 04/13/21  0708 04/04/21  0704 04/03/21  1357    136 138   POTASSIUM 3.7 4.3 3.8   CHLORIDE 108 107 108   CO2 25 24 24   BUN 32* 26 23   CR 1.07* 1.19* 1.13*   ANIONGAP 6 5 6   GALINA 9.5 9.9 10.1   GLC 92 98 181*     NA    DISCHARGE PLAN:    Follow up labs: No labs orders/due    Medical Follow Up:      Follow up with primary care provider in 1-2 weeks    MTM referral needed and placed by this provider: No    Current [facility] scheduled appointments:       Discharge Services: Home Care:  Occupational Therapy, Physical Therapy, Speech Therapy , Registered Nurse, Home Health Aide and     Discharge Instructions Verbalized to Patient at Discharge:     None      TOTAL DISCHARGE TIME:   Greater than 30 minutes  Electronically signed by:  Anson Cano NP     Home care Face to Face documentation done in [facility] attached to Home care orders for [facility].

## 2021-04-28 ENCOUNTER — HOSPITAL LABORATORY (OUTPATIENT)
Dept: OTHER | Facility: CLINIC | Age: 86
End: 2021-04-28

## 2021-04-29 LAB
LABORATORY COMMENT REPORT: NORMAL
SARS-COV-2 RNA RESP QL NAA+PROBE: NEGATIVE
SARS-COV-2 RNA RESP QL NAA+PROBE: NORMAL
SPECIMEN SOURCE: NORMAL
SPECIMEN SOURCE: NORMAL

## 2021-05-06 ENCOUNTER — PATIENT OUTREACH (OUTPATIENT)
Dept: CARE COORDINATION | Facility: CLINIC | Age: 86
End: 2021-05-06

## 2021-05-06 ASSESSMENT — ACTIVITIES OF DAILY LIVING (ADL): DEPENDENT_IADLS:: INDEPENDENT

## 2021-05-06 NOTE — PROGRESS NOTES
Clinic Care Coordination Contact  Care Team Conversations    Per chart review, patient discharged from [facility] on 4/27/21 to a facility with home care services through [facility].    CC RN sent e-mail to [facility] FAUST[facility] and requested information on where patient was discharged to and if any additional services in place.  FAUSTINO replied via e-mail; informed that patient was discharged to [facility] with home care services.    CC RN called [facility] (ph: [phone]); left voicemail for Khushi Hernandez assisted healthcare coordinator, and requested return call to discuss patient's current services/supports through facility.  CC RN will await return call.    UPDATE on 5/10/21 at 1018: CC RN called again to [facility] (ph: [phone]) to get updates on patient's status; was able to speak with Khushi assisted healthcare coordinator RN, who updated that patient is in their memory care unit and is now followed by SOLITARIO Antony with [facility].  Patient also enrolled with [facility].  Khushi denied patient needs/concerns at this time.  CC RN updated patient's care team accordingly; no further outreaches will be made.    Mick Bhagat, RN  Clinic Care Coordinator  [facility]  [facility]  Ph: [phone]

## 2021-05-10 ASSESSMENT — ACTIVITIES OF DAILY LIVING (ADL): DEPENDENT_IADLS:: INDEPENDENT

## 2021-06-24 ENCOUNTER — PRE VISIT (OUTPATIENT)
Dept: NEUROLOGY | Facility: CLINIC | Age: 86
End: 2021-06-24

## 2021-08-05 ENCOUNTER — TELEPHONE (OUTPATIENT)
Dept: NEUROLOGY | Facility: CLINIC | Age: 86
End: 2021-08-05

## 2021-08-05 NOTE — TELEPHONE ENCOUNTER
----- Message from Lauren Sosa RN sent at 6/28/2021 11:48 AM CDT -----  Please contact patient to reschedule appointment with Dr. Lujan. She was late to her 6/24 appointment and not seen.    ThanksNaren

## 2021-12-13 NOTE — PLAN OF CARE
Alert to self and intermittently able to state date/month. VSS on RA. Denies pain. Up A1 GB/ W to BSC. Incontinent. Neuros grossly intact ex WFD, inconsistently following commands, difficulty understanding commands at times. [place], worse on Left. CMS intact. Had nosebleed overnight. Continue to monitor.    [Follow-up Visit ___] : a follow-up visit  for [unfilled]
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
## 2024-09-19 NOTE — MR AVS SNAPSHOT
After Visit Summary   8/21/2018    Tanisha Muhammad    MRN: [id number]           Patient Information     Date Of Birth          9/10/1926        Visit Information        Provider Department      8/21/2018 3:00 PM Roxi Thakur MD [facility]        Today's Diagnoses     Cystocele with rectocele    -  1    Frequency of urination        Dysuria          Care Instructions    Pessary replaced without issues.  Will return for removal/cleaning in 4-5 months per Dr. Pollard's previous recommendation.  Will start oral course of keflex for UTI and follow up with urine culture later this week.          Follow-ups after your visit        Follow-up notes from your care team     Return if symptoms worsen or fail to improve.      Your next 10 appointments already scheduled     Aug 28, 2018 12:40 PM CDT   TIAGO Extremity with Akila Jay PT   [facility] ([facility]  )    [address] [place] [address] [id number]  [facility] [address] [phone]              Who to contact     If you have questions or need follow up information about today's clinic visit or your schedule please contact [facility] directly at [phone].  Normal or non-critical lab and imaging results will be communicated to you by MyChart, letter or phone within 4 business days after the clinic has received the results. If you do not hear from us within 7 days, please contact the clinic through MyChart or phone. If you have a critical or abnormal lab result, we will notify you by phone as soon as possible.  Submit refill requests through yWorldt or call your pharmacy and they will forward the refill request to us. Please allow 3 business days for your refill to be completed.          Additional Information About Your Visit        Care EveryWhere ID     This is your Care EveryWhere ID. This could be used by other organizations to access your [facility]  "records  [id number]        Your Vitals Were     Pulse Height BMI (Body Mass Index)             80 5' 3\" (1.6 m) 29.05 kg/m2          Blood Pressure from Last 3 Encounters:   08/21/18 132/76   07/31/18 136/74   07/26/18 112/70    Weight from Last 3 Encounters:   08/21/18 164 lb (74.4 kg)   07/31/18 165 lb 11.2 oz (75.2 kg)   07/26/18 165 lb (74.8 kg)              We Performed the Following     **UA reflex to Microscopic FUTURE anytime     Urine Culture Aerobic Bacterial     Urine Microscopic          Today's Medication Changes          These changes are accurate as of 8/21/18  4:03 PM.  If you have any questions, ask your nurse or doctor.               Start taking these medicines.        Dose/Directions    cephalexin 250 MG capsule   Commonly known as:  KEFLEX   Used for:  Dysuria        Dose:  250 mg   Take 1 capsule (250 mg) by mouth 2 times daily for 5 days   Quantity:  10 capsule   Refills:  0            Where to get your medicines      These medications were sent to [facility] - [place], [place] - [address] [facility]  [address]  [facility] [place] [address]     Phone:  [phone]     cephalexin 250 MG capsule                Primary Care Provider Office Phone # Fax #    Sesar Cuevas [phone] [address] [facility] [address]  [place] [address]        Equal Access to Services     [facility]: Hadii debby robleso Soalber, waaxda luqadaha, qaybta kaalmada adeegyada, theron peterson adevadim fitzgerald. So [facility] [phone].    ATENCIÓN: Si habla español, tiene a villanueva disposición servicios gratuitos de asistencia lingüística. Llame al [phone].    We comply with applicable federal civil rights laws and [place] laws. We do not discriminate on the basis of race, color, national origin, age, disability, sex, sexual orientation, or gender identity.            Thank you!     Thank you for choosing [facility]  for your care. Our " goal is always to provide you with excellent care. Hearing back from our patients is one way we can continue to improve our services. Please take a few minutes to complete the written survey that you may receive in the mail after your visit with us. Thank you!             Your Updated Medication List - Protect others around you: Learn how to safely use, store and throw away your medicines at www.disposemymeds.org.          This list is accurate as of 8/21/18  4:03 PM.  Always use your most recent med list.                   Brand Name Dispense Instructions for use Diagnosis    cephalexin 250 MG capsule    KEFLEX    10 capsule    Take 1 capsule (250 mg) by mouth 2 times daily for 5 days    Dysuria       losartan 50 MG tablet    COZAAR    90 tablet    TAKE 1 TABLET (50 MG) BY MOUTH DAILY    Essential hypertension, benign       VITAMIN B-12 PO      Take 1 tablet by mouth daily           Simple: Patient demonstrates quick and easy understanding/Verbalized Understanding